# Patient Record
Sex: MALE | Race: WHITE | NOT HISPANIC OR LATINO | Employment: FULL TIME | URBAN - METROPOLITAN AREA
[De-identification: names, ages, dates, MRNs, and addresses within clinical notes are randomized per-mention and may not be internally consistent; named-entity substitution may affect disease eponyms.]

---

## 2017-04-04 ENCOUNTER — HOSPITAL ENCOUNTER (EMERGENCY)
Facility: CLINIC | Age: 27
Discharge: HOME | End: 2017-04-05
Attending: EMERGENCY MEDICINE | Admitting: EMERGENCY MEDICINE
Payer: COMMERCIAL

## 2017-04-04 VITALS
BODY MASS INDEX: 22.68 KG/M2 | HEIGHT: 71 IN | RESPIRATION RATE: 20 BRPM | TEMPERATURE: 98.3 F | WEIGHT: 162 LBS | DIASTOLIC BLOOD PRESSURE: 86 MMHG | HEART RATE: 86 BPM | SYSTOLIC BLOOD PRESSURE: 143 MMHG | OXYGEN SATURATION: 99 %

## 2017-04-04 DIAGNOSIS — R05.9 COUGH: ICD-10-CM

## 2017-04-04 DIAGNOSIS — J06.9 UPPER RESPIRATORY TRACT INFECTION, UNSPECIFIED TYPE: ICD-10-CM

## 2017-04-04 PROCEDURE — 99282 EMERGENCY DEPT VISIT SF MDM: CPT

## 2017-04-04 RX ORDER — BENZONATATE 100 MG/1
100 CAPSULE ORAL 3 TIMES DAILY PRN
Qty: 20 CAPSULE | Refills: 0 | Status: SHIPPED | OUTPATIENT
Start: 2017-04-04 | End: 2018-02-17

## 2017-04-04 RX ORDER — ALBUTEROL SULFATE 90 UG/1
2 AEROSOL, METERED RESPIRATORY (INHALATION) EVERY 4 HOURS PRN
Qty: 1 INHALER | Refills: 0 | Status: SHIPPED | OUTPATIENT
Start: 2017-04-04 | End: 2018-02-17

## 2017-04-04 NOTE — ED AVS SNAPSHOT
[facility]    [address] [place] [address]    Phone:  [phone]    Fax:  [phone]                                       Scooby Ellington   MRN: [id number]    Department:  [facility]   Date of Visit:  4/4/2017           Patient Information     Date Of Birth          1990        Your diagnoses for this visit were:     Cough     Upper respiratory tract infection, unspecified type        You were seen by Doroteo Sánchez MD.      Follow-up Information     Follow up with Bon Dunn MD. Schedule an appointment as soon as possible for a visit in 3 days.    Specialty:  Internal Medicine    Contact information:    [facility]  [address] [place] [address]  [phone]          Follow up with [facility].    Specialty:  EMERGENCY MEDICINE    Why:  For suture removal, If symptoms worsen    Contact information:    [address] [place] [address] [phone]        Discharge Instructions       Discharge Instructions  Bronchitis, Pneumonia, Bronchospasm    You were seen today for a chest infection or inflammation. If your doctor decided this was due to a bacterial infection, you may need an antibiotic. Sometimes these are caused by a virus, and then an antibiotic will not help.     Return to the Emergency Department if:    Your breathing gets much worse.    You are very weak, or feel much more ill.    You develop new symptoms, such as chest pain.    You cough up blood.    You are vomiting enough that you can t keep fluids or your medicine down.    What can I do to help myself?    Fill any prescriptions the doctor gave you and take them right away--especially antibiotics. Be sure to finish the whole antibiotic prescription.    You may be given a prescription for an inhaler, which can help loosen tight air passages.  Use this as needed,  "but not more often than directed. Inhalers work much better when used with a spacer.     You may be given a prescription for a steroid to reduce inflammation. Used long-term, these can have many serious side effects, but for short courses these do not happen. You may notice restlessness or increased appetite.        You may use non-prescription cough or cold medicines. Cough medicines may help, but don t make the cough go away completely.     Avoid smoke, because this can make your symptoms worse. If you smoke, this may be a good time to quit! Consider using nicotine lozenges, gum, or patches to reduce cravings.     If you have a fever, Tylenol  (acetaminophen), Motrin  (ibuprofen), or Advil  (ibuprofen) may help bring fever down and may help you feel more comfortable. Be sure to read and follow the package directions, and ask your doctor if you have questions.    Be sure to get your flu shot each year.  The pneumonia shot can help prevent pneumonia.  Probiotics: If you have been given an antibiotic, you may want to also take a probiotic pill or eat yogurt with live cultures. Probiotics have \"good bacteria\" to help your intestines stay healthy. Studies have shown that probiotics help prevent diarrhea and other intestine problems (including C. diff infection) when you take antibiotics. You can buy these without a prescription in the pharmacy section of the store.     If your doctor has told you to follow-up at your clinic, be sure to call right away and go to your appointment.  If there is any problem with keeping your appointment, call your doctor or return to the Emergency Department.    If you were given a prescription for medicine here today, be sure to read all of the information (including the package insert) that comes with your prescription.  This will include important information about the medicine, its side effects, and any warnings that you need to know about.  The pharmacist who fills the prescription " can provide more information and answer questions you may have about the medicine.  If you have questions or concerns that the pharmacist cannot address, please call or return to the Emergency Department.     Opioid Medication Information    Pain medications are among the most commonly prescribed medicines, so we are including this information for all our patients. If you did not receive pain medication or get a prescription for pain medicine, you can ignore it.     You may have been given a prescription for an opioid (narcotic) pain medicine and/or have received a pain medicine while here in the Emergency Department. These medicines can make you drowsy or impaired. You must not drive, operate dangerous equipment, or engage in any other dangerous activities while taking these medications. If you drive while taking these medications, you could be arrested for DUI, or driving under the influence. Do not drink any alcohol while you are taking these medications.     Opioid pain medications can cause addiction. If you have a history of chemical dependency of any type, you are at a higher risk of becoming addicted to pain medications.  Only take these prescribed medications to treat your pain when all other options have been tried. Take it for as short a time and as few doses as possible. Store your pain pills in a secure place, as they are frequently stolen and provide a dangerous opportunity for children or visitors in your house to start abusing these powerful medications. We will not replace any lost or stolen medicine.  As soon as your pain is better, you should flush all your remaining medication.     Many prescription pain medications contain Tylenol  (acetaminophen), including Vicodin , Tylenol #3 , Norco , Lortab , and Percocet .  You should not take any extra pills of Tylenol  if you are using these prescription medications or you can get very sick.  Do not ever take more than 3000 mg of acetaminophen in any 24  hour period.    All opioids tend to cause constipation. Drink plenty of water and eat foods that have a lot of fiber, such as fruits, vegetables, prune juice, apple juice and high fiber cereal.  Take a laxative if you don t move your bowels at least every other day. Miralax , Milk of Magnesia, Colace , or Senna  can be used to keep you regular.      Remember that you can always come back to the Emergency Department if you are not able to see your regular doctor in the amount of time listed above, if you get any new symptoms, or if there is anything that worries you.          24 Hour Appointment Hotline       To make an appointment at any [facility], call [phone] ([phone]). If you don't have a family doctor or clinic, we will help you find one. [facility] clinics are conveniently located to serve the needs of you and your family.             Review of your medicines      START taking        Dose / Directions Last dose taken    albuterol 108 (90 BASE) MCG/ACT Inhaler   Commonly known as:  albuterol   Dose:  2 puff   Quantity:  1 Inhaler        Inhale 2 puffs into the lungs every 4 hours as needed for shortness of breath / dyspnea   Refills:  0        benzonatate 100 MG capsule   Commonly known as:  TESSALON   Dose:  100 mg   Quantity:  20 capsule        Take 1 capsule (100 mg) by mouth 3 times daily as needed for cough   Refills:  0          Our records show that you are taking the medicines listed below. If these are incorrect, please call your family doctor or clinic.        Dose / Directions Last dose taken    oxyCODONE-acetaminophen 5-325 MG per tablet   Commonly known as:  PERCOCET   Dose:  1-2 tablet   Quantity:  30 tablet        Take 1-2 tablets by mouth every 4 hours as needed for pain (moderate to severe)   Refills:  0                Prescriptions were sent or printed at these locations (2 Prescriptions)                   Other Prescriptions                Printed at Department/Unit printer  (2 of 2)         albuterol (ALBUTEROL) 108 (90 BASE) MCG/ACT Inhaler               benzonatate (TESSALON) 100 MG capsule                Orders Needing Specimen Collection     None      Pending Results     No orders found for last 3 day(s).            Pending Culture Results     No orders found for last 3 day(s).            Test Results From Your Hospital Stay               Clinical Quality Measure: Blood Pressure Screening     Your blood pressure was checked while you were in the emergency department today. The last reading we obtained was  BP: 143/86 . Please read the guidelines below about what these numbers mean and what you should do about them.  If your systolic blood pressure (the top number) is less than 120 and your diastolic blood pressure (the bottom number) is less than 80, then your blood pressure is normal. There is nothing more that you need to do about it.  If your systolic blood pressure (the top number) is 120-139 or your diastolic blood pressure (the bottom number) is 80-89, your blood pressure may be higher than it should be. You should have your blood pressure rechecked within a year by a primary care provider.  If your systolic blood pressure (the top number) is 140 or greater or your diastolic blood pressure (the bottom number) is 90 or greater, you may have high blood pressure. High blood pressure is treatable, but if left untreated over time it can put you at risk for heart attack, stroke, or kidney failure. You should have your blood pressure rechecked by a primary care provider within the next 4 weeks.  If your provider in the emergency department today gave you specific instructions to follow-up with your doctor or provider even sooner than that, you should follow that instruction and not wait for up to 4 weeks for your follow-up visit.        Thank you for choosing [facility]       Thank you for choosing [facility] for your care. Our goal is always to provide you with excellent care. Hearing  "back from our patients is one way we can continue to improve our services. Please take a few minutes to complete the written survey that you may receive in the mail after you visit with us. Thank you!        [facility] Information     [employer] lets you send messages to your doctor, view your test results, renew your prescriptions, schedule appointments and more. To sign up, go to [email or web address] . Click on \"Log in\" on the left side of the screen, which will take you to the Welcome page. Then click on \"Sign up Now\" on the right side of the page.     You will be asked to enter the access code listed below, as well as some personal information. Please follow the directions to create your username and password.     Your access code is: [email or web address]  Expires: 7/3/2017 11:58 PM     Your access code will  in 90 days. If you need help or a new code, please call your [facility] clinic or [phone].        Care EveryWhere ID     This is your Care EveryWhere ID. This could be used by other organizations to access your [facility] medical records  [id number]        After Visit Summary       This is your record. Keep this with you and show to your community pharmacist(s) and doctor(s) at your next visit.                  "

## 2017-04-04 NOTE — ED AVS SNAPSHOT
[facility]    [address]    [facility] [address]    Phone:  [phone]    Fax:  [phone]                                       Scooby Ellington   MRN: [id number]    Department:  [facility]   Date of Visit:  4/4/2017           After Visit Summary Signature Page     I have received my discharge instructions, and my questions have been answered. I have discussed any challenges I see with this plan with the nurse or doctor.    ..........................................................................................................................................  Patient/Patient Representative Signature      ..........................................................................................................................................  Patient Representative Print Name and Relationship to Patient    ..................................................               ................................................  Date                                            Time    ..........................................................................................................................................  Reviewed by Signature/Title    ...................................................              ..............................................  Date                                                            Time

## 2017-04-05 NOTE — ED PROVIDER NOTES
CHIEF COMPLAINT:  Cough.      HISTORY OF PRESENT ILLNESS:  Scooby Ellington is a 26-year-old male presenting to the Emergency Department for evaluation of cough and left otalgia.  Symptoms have been present for approximately 3 days.  The cough has been intermittently productive of green and yellow sputum.  Left ear pain feels as though there is a pressure feeling.  He has noted associated rhinorrhea and mild nasal congestion.  He is a tobacco user, although is currently reducing his daily use.  He denies associated chest pain or shortness of breath.  Offers no other complaints or concerns at this time.  He has not received influenza vaccine this year.  No history otherwise of immunocompromising conditions.      PAST MEDICAL HISTORY:  Tobacco use.  History of eye trauma.  MRSA positive.      MEDICATIONS:  No regular medications.      PAST SURGICAL HISTORY:  Appendectomy.  Right ocular surgery.      SOCIAL HISTORY:  Every day smoker, marijuana use intermittently, .      REVIEW OF SYSTEMS:  A 10-point review of systems is negative aside from that mentioned in history of present illness.      PHYSICAL EXAMINATION:   VITAL SIGNS:  Nursing notes reviewed.  Blood pressure 143/86, pulse 86, temperature 98.3, SpO2 98%.   GENERAL:  Awake, alert, well-appearing, answers questions appropriately.   ENT:  Moist mucous membranes, posterior oropharynx is symmetric without erythema or exudate, no uvular deviation, no trismus, no oral lesions, tympanic membranes are translucent and gray bilaterally without appreciable erythema or overlying skin changes.   NECK:  Full range of motion, no lymphadenopathy.   RESPIRATORY:  Faint expiratory wheezes on the left side.  Remainder of the lung fields are clear.  No focal crackles, no increased respiratory effort, no stridor.   CARDIOVASCULAR:  Regular rate and rhythm, S1, S2 is present, no murmurs, gallops or rubs.   GASTROINTESTINAL:  Abdomen is soft and nontender, nondistended.  No  guarding or rebound is noted.   MUSCULOSKELETAL:  Full range of motion in upper and lower extremities, no obvious deformities, weightbearing.   NEUROLOGIC:  Good bulk and tone in upper and lower extremities, no focal deficits.   PSYCHIATRIC:  Mood and affect are appropriate.      MEDICAL DECISION MAKING:  Scooby Ellington is a 26-year-old male presenting to the Emergency Department for evaluation of cough and otalgia.  Symptoms have been present for approximately 3 days.  Vital signs on presentation reveal elevated blood pressure, otherwise are unremarkable.  History, exam and ED course are most consistent with upper respiratory infection.  He does have symptoms including nasal congestion, rhinorrhea, cough, and otalgia as well as cough which supports this diagnosis.  There is no current evidence of acute otitis media, mastoiditis, otitis externa, deep space neck infections, or pharyngitis.  Pulmonary exam did not reveal focal areas of consolidation.  In the absence of fever as well as given the duration of symptoms, I feel x-ray can be deferred safely.  He does have isolated area of faint expiratory wheezing which is likely related to bronchitis and in the setting of chronic tobacco use.  At this point, influenza testing indicated that he was outside the range for treatment.  Clinically, he shows no signs of systemic toxicity or increased respiratory effort.  The patient felt safe and stable for discharge home with close outpatient followup.  Recommended fluids to ensure hydration, some over-the-counter decongestants and Tessalon for cough suppression and albuterol inhaler p.r.n. for shortness of breath.  Return to the ER with worsening cough, shortness of breath, fevers or any other concerns.      IMPRESSION:     1.  Bronchitis.   2.  Respiratory infection.       XANDER CARDOSO MD           D: 04/05/2017 00:33   T: 04/05/2017 04:17   MT: [id number]      Name:     SCOOBY ELLINGTON   MRN:      [id number]         Account:      [id number]   :      1990           Visit Date:   2017      Document: [id number]

## 2017-04-05 NOTE — ED PROVIDER NOTES
Please See Dictated Note for Details regarding of encounter of same date    Dictation # - [id number]    12:33 AM  4/5/2017    Doroteo Sánchez MD  [employer]  [facility]       Doroteo Sánchez MD  04/05/17 0034

## 2018-02-17 ENCOUNTER — HOSPITAL ENCOUNTER (EMERGENCY)
Facility: CLINIC | Age: 28
Discharge: HOME | End: 2018-02-17
Attending: PHYSICIAN ASSISTANT | Admitting: PHYSICIAN ASSISTANT
Payer: COMMERCIAL

## 2018-02-17 ENCOUNTER — APPOINTMENT (OUTPATIENT)
Dept: GENERAL RADIOLOGY | Facility: CLINIC | Age: 28
End: 2018-02-17
Attending: PHYSICIAN ASSISTANT
Payer: COMMERCIAL

## 2018-02-17 VITALS
TEMPERATURE: 98.9 F | WEIGHT: 168 LBS | OXYGEN SATURATION: 97 % | DIASTOLIC BLOOD PRESSURE: 88 MMHG | SYSTOLIC BLOOD PRESSURE: 135 MMHG | RESPIRATION RATE: 18 BRPM | BODY MASS INDEX: 24.05 KG/M2 | HEIGHT: 70 IN

## 2018-02-17 DIAGNOSIS — S61.239A PUNCTURE WOUND OF FINGER OF LEFT HAND, INITIAL ENCOUNTER: ICD-10-CM

## 2018-02-17 PROCEDURE — 73140 X-RAY EXAM OF FINGER(S): CPT | Mod: LT

## 2018-02-17 PROCEDURE — 99283 EMERGENCY DEPT VISIT LOW MDM: CPT

## 2018-02-17 ASSESSMENT — ENCOUNTER SYMPTOMS: WOUND: 1

## 2018-02-17 NOTE — ED AVS SNAPSHOT
[facility]    [address]    [facility] [address]    Phone:  [phone]    Fax:  [phone]                                       Scooby Ellington   MRN: [id number]    Department:  [facility]   Date of Visit:  2/17/2018           After Visit Summary Signature Page     I have received my discharge instructions, and my questions have been answered. I have discussed any challenges I see with this plan with the nurse or doctor.    ..........................................................................................................................................  Patient/Patient Representative Signature      ..........................................................................................................................................  Patient Representative Print Name and Relationship to Patient    ..................................................               ................................................  Date                                            Time    ..........................................................................................................................................  Reviewed by Signature/Title    ...................................................              ..............................................  Date                                                            Time

## 2018-02-17 NOTE — ED AVS SNAPSHOT
[facility]    [address]    [facility] [address]    Phone:  [phone]    Fax:  [phone]                                       Scooby Ellington   MRN: [id number]    Department:  [facility]   Date of Visit:  2/17/2018           Patient Information     Date Of Birth          1990        Your diagnoses for this visit were:     Puncture wound of finger of left hand, initial encounter        You were seen by Mis Marcus PA-C.      Follow-up Information     Follow up with [facility] In 2 days.    Specialties:  Sports Medicine, Pain Management, Obstetrics & Gynecology, Pediatrics, Internal Medicine, Nephrology    Why:  As needed    Contact information:    [address]  [facility] [address] [phone]        Follow up with [facility].    Specialty:  EMERGENCY MEDICINE    Why:  If symptoms worsen    Contact information:    [address]  [facility] [address] [phone]        Discharge Instructions         Puncture Wound       A puncture wound occurs when a pointed object pushes into the skin. It may go into the tissues below the skin, including fat and muscle. This type of wound is narrow and deep and can be hard to clean. Because of this, puncture wounds are at high risk for becoming infected.  X-rays may be done to check the wound for objects stuck under the skin. Your may also need a tetanus shot. This is given if you are not up to date on this vaccination and the object that caused the wound may lead to tetanus.  Home care    Your healthcare provider may prescribe an antibiotic. This is to help prevent infection. Follow all instructions for taking this medicine. Take the medicine every day until it is gone or you are told to stop. You should not have any left over.    The healthcare provider may  prescribe medicines for pain. Follow instructions for taking them.    You can take acetaminophen or ibuprofen for pain, unless you were given a different pain medicine to use.     Follow the healthcare provider s instructions on how to care for the wound.    Keep the wound clean and dry. Do not get the wound wet until you are told it is okay to do so. If the area gets wet, gently pat it dry with a clean cloth. Replace the wet bandage with a dry one.    If a bandage was applied and it becomes wet or dirty, replace it. Otherwise, leave it in place for the first 24 hours.    Once you can get the wound wet, you may shower as usual but do not soak the wound in water (no tub baths or swimming)    Even with proper treatment, a puncture wound may become infected. Check the wound daily for signs of infection listed below.  Follow-up care  Follow up with your healthcare provider as advised.   When to seek medical advice  Call your healthcare provider right away if any of these occur:    Signs of infection, including:    Increasing redness or swelling around the wound    Increased warmth of the wound    Worsening pain    Red streaking lines away from the wound    Draining pus    Fever of 100.4 F (38. C) or higher or as directed by your healthcare provider    Wound changes colors    Numbness around the wound    Decreased movement around the injured area  Date Last Reviewed: 8/24/2015 2000-2017 [employer]. [address] [place] [address]. All rights reserved. This information is not intended as a substitute for professional medical care. Always follow your healthcare professional's instructions.          24 Hour Appointment Hotline       To make an appointment at any [facility], call [phone] ([phone]). If you don't have a family doctor or clinic, we will help you find one. [facility] clinics are conveniently located to serve the needs of you and your family.             Review of your  medicines      Notice     You have not been prescribed any medications.            Procedures and tests performed during your visit     Fingers XR, 2-3 views, left      Orders Needing Specimen Collection     None      Pending Results     No orders found from 2/15/2018 to 2/18/2018.            Pending Culture Results     No orders found from 2/15/2018 to 2/18/2018.            Pending Results Instructions     If you had any lab results that were not finalized at the time of your Discharge, you can call the ED Lab Result RN at [phone]. You will be contacted by this team for any positive Lab results or changes in treatment. The nurses are available 7 days a week from 10A to 6:30P.  You can leave a message 24 hours per day and they will return your call.        Test Results From Your Hospital Stay        2/17/2018  7:03 PM      Narrative     LEFT FINGER TWO OR MORE VIEWS    2/17/2018 6:40 PM     HISTORY: Puncture wound, possible bone fracture.     COMPARISON: None.    FINDINGS: The left fifth finger is normal. No fracture. No opaque  foreign body density in the soft tissues.        Impression     IMPRESSION: Negative.     ARTEMIO GRAY MD                Clinical Quality Measure: Blood Pressure Screening     Your blood pressure was checked while you were in the emergency department today. The last reading we obtained was  BP: 135/88 . Please read the guidelines below about what these numbers mean and what you should do about them.  If your systolic blood pressure (the top number) is less than 120 and your diastolic blood pressure (the bottom number) is less than 80, then your blood pressure is normal. There is nothing more that you need to do about it.  If your systolic blood pressure (the top number) is 120-139 or your diastolic blood pressure (the bottom number) is 80-89, your blood pressure may be higher than it should be. You should have your blood pressure rechecked within a year by a primary care  "provider.  If your systolic blood pressure (the top number) is 140 or greater or your diastolic blood pressure (the bottom number) is 90 or greater, you may have high blood pressure. High blood pressure is treatable, but if left untreated over time it can put you at risk for heart attack, stroke, or kidney failure. You should have your blood pressure rechecked by a primary care provider within the next 4 weeks.  If your provider in the emergency department today gave you specific instructions to follow-up with your doctor or provider even sooner than that, you should follow that instruction and not wait for up to 4 weeks for your follow-up visit.        Thank you for choosing [facility]       Thank you for choosing [facility] for your care. Our goal is always to provide you with excellent care. Hearing back from our patients is one way we can continue to improve our services. Please take a few minutes to complete the written survey that you may receive in the mail after you visit with us. Thank you!        [employer] Information     [employer] lets you send messages to your doctor, view your test results, renew your prescriptions, schedule appointments and more. To sign up, go to [email or web address] . Click on \"Log in\" on the left side of the screen, which will take you to the Welcome page. Then click on \"Sign up Now\" on the right side of the page.     You will be asked to enter the access code listed below, as well as some personal information. Please follow the directions to create your username and password.     Your access code is: [email or web address]  Expires: 2018  7:11 PM     Your access code will  in 90 days. If you need help or a new code, please call your [facility] clinic or [phone].        Care EveryWhere ID     This is your Care EveryWhere ID. This could be used by other organizations to access your [facility] medical records  [id number]        Equal Access to Services     ELIZABETH FOLEY AH: Paige ruiz" brian Garza, edin mckeon, lyric hodges, beatriz loomis. So [facility] [phone].    ATENCIÓN: Si habla español, tiene a jensen disposición servicios gratuitos de asistencia lingüística. Llame al [phone].    We comply with applicable federal civil rights laws and [place] laws. We do not discriminate on the basis of race, color, national origin, age, disability, sex, sexual orientation, or gender identity.            After Visit Summary       This is your record. Keep this with you and show to your community pharmacist(s) and doctor(s) at your next visit.

## 2018-02-18 NOTE — ED NOTES
"A&Ox4. ABC's intact. Pt c/o left 5th finger puncture wound from metal pin at work. Pt was trying to pull it out of a machine and it went into his pinky.  States it got stuck and he had to pull it out \"probably to the bone\".   Pain 5/10 at this time.   "

## 2018-02-18 NOTE — ED PROVIDER NOTES
"  History     Chief Complaint:  Puncture Wound    HPI   Scooby Ellington is a 27 year old male who presents with puncture wound. The patient reports that 1430 hours today that he was applying manual force to a vehicle at work when his hands broke away under continued force and landed with his left 5th finger onto a 3/16\" brake line, producing a wound where the brake line stopped \"at my bone\" and didn't puncture all the way through. He pulled it out right away. He also reports that the portion of the brake line that broke his skin was clean and that the resulting wound bled profusely since and wouldn't close. The patient mentioned that his 5/10 pain scale pain is localized to the left 5th finger with radiation to his knuckle. The patient noted that he is up to date on his tetanus shot and denies any allergies and history of diabetes, cancer, and being immunocompromised.     Allergies:  No known drug allergies    Medications:    The patient is currently on no regular medications.    Past Medical History:    H/O Eye Trauma  MRSA Culture Positive    Past Surgical History:    HC REMV Cataract Extracap, Insert Lens  Laparoscopic Appendectomy    Family History:    Heart Disease  Kidney Cancer  Cancer, Lymph Node    Social History:  Marital Status:     Tobacco Status: Current Smoker, 1/2 pack per day; Former Smokeless Tobacco User  Alcohol Use: Yes, Occasionally     Review of Systems   Skin: Positive for wound.   All other systems reviewed and are negative.    Physical Exam     Patient Vitals for the past 24 hrs:   BP Temp Temp src Heart Rate Resp SpO2 Height Weight   02/17/18 1806 135/88 98.9  F (37.2  C) Temporal 92 18 97 % 1.778 m (5' 10\") 76.2 kg (168 lb)     Physical Exam  Constitutional:  Alert, attentive  Cardiovascular:  2+ radial and ulnar pulses to the bilateral upper extremities   Neurological:  5/5 strength to the radian, ulnar and median motor distributions;      sensation intact to light touch to the " radian, ulnar and median distributions  MSK:   There is a small puncture wound to the left proximal 5th finger to the lateral aspect. Normal ROM without pain to the digits of the left hand. No other    injuries.   Skin:    Skin is warm and dry.      Emergency Department Course     Imaging:  Radiographic findings were communicated with the patient who voiced understanding of the findings.  X-ray Left Fingers, 2-3 views:  IMPRESSION: Negative.   Result per radiology.     Emergency Department Course:  Past medical records, nursing notes, and vitals reviewed.  1809: I performed an exam of the patient and obtained history, as documented above.  The patient was sent for a Left Finger X-Ray while in the emergency department, findings above.  The ED tech irrigated and dressed the puncture wound  1910: I rechecked the patient. Findings and plan explained to the Patient. Patient discharged home with instructions regarding supportive care, medications, and reasons to return. The importance of close follow-up was reviewed.     Impression & Plan      Medical Decision Making:  Scooby Ellington is a 27 year old male who presents for evaluation of a puncture wound to the left fifth finger.  This was not already closed upon presentation to the ED.     Xray shows no fracture, air in soft tissues or foreign body. Antibiotics are not indicated but he will watch for signs of infection and follow up with PCP in the next few days. Exam is benign at this point. No signs of lymphadenitis, lymphangitis, necrotizing fascitis, osteomyelitis, abscess, cellulitis, etc.    Diagnosis:    ICD-10-CM    1. Puncture wound of finger of left hand, initial encounter S61.239A        Disposition:  discharged to home    Gomez Galindo  2/17/2018   [facility] EMERGENCY DEPARTMENT  RANJAN, Gomez Galindo, am serving as a scribe at 6:09 PM on 2/17/2018 to document services personally performed by Mis Marcus* based on my observations and the  provider's statements to me.         Mis Marcus PA-C  02/17/18 2052

## 2018-02-18 NOTE — DISCHARGE INSTRUCTIONS
Puncture Wound       A puncture wound occurs when a pointed object pushes into the skin. It may go into the tissues below the skin, including fat and muscle. This type of wound is narrow and deep and can be hard to clean. Because of this, puncture wounds are at high risk for becoming infected.  X-rays may be done to check the wound for objects stuck under the skin. Your may also need a tetanus shot. This is given if you are not up to date on this vaccination and the object that caused the wound may lead to tetanus.  Home care    Your healthcare provider may prescribe an antibiotic. This is to help prevent infection. Follow all instructions for taking this medicine. Take the medicine every day until it is gone or you are told to stop. You should not have any left over.    The healthcare provider may prescribe medicines for pain. Follow instructions for taking them.    You can take acetaminophen or ibuprofen for pain, unless you were given a different pain medicine to use.     Follow the healthcare provider s instructions on how to care for the wound.    Keep the wound clean and dry. Do not get the wound wet until you are told it is okay to do so. If the area gets wet, gently pat it dry with a clean cloth. Replace the wet bandage with a dry one.    If a bandage was applied and it becomes wet or dirty, replace it. Otherwise, leave it in place for the first 24 hours.    Once you can get the wound wet, you may shower as usual but do not soak the wound in water (no tub baths or swimming)    Even with proper treatment, a puncture wound may become infected. Check the wound daily for signs of infection listed below.  Follow-up care  Follow up with your healthcare provider as advised.   When to seek medical advice  Call your healthcare provider right away if any of these occur:    Signs of infection, including:    Increasing redness or swelling around the wound    Increased warmth of the wound    Worsening pain    Red  streaking lines away from the wound    Draining pus    Fever of 100.4 F (38. C) or higher or as directed by your healthcare provider    Wound changes colors    Numbness around the wound    Decreased movement around the injured area  Date Last Reviewed: 8/24/2015 2000-2017 [employer]. [address] [place] [address]. All rights reserved. This information is not intended as a substitute for professional medical care. Always follow your healthcare professional's instructions.

## 2018-05-31 ENCOUNTER — HOSPITAL ENCOUNTER (EMERGENCY)
Facility: CLINIC | Age: 28
Discharge: HOME | End: 2018-06-01
Attending: EMERGENCY MEDICINE | Admitting: EMERGENCY MEDICINE
Payer: COMMERCIAL

## 2018-05-31 ENCOUNTER — APPOINTMENT (OUTPATIENT)
Dept: GENERAL RADIOLOGY | Facility: CLINIC | Age: 28
End: 2018-05-31
Attending: PHYSICIAN ASSISTANT
Payer: COMMERCIAL

## 2018-05-31 VITALS
RESPIRATION RATE: 16 BRPM | BODY MASS INDEX: 23.1 KG/M2 | SYSTOLIC BLOOD PRESSURE: 147 MMHG | WEIGHT: 165 LBS | HEART RATE: 79 BPM | OXYGEN SATURATION: 100 % | DIASTOLIC BLOOD PRESSURE: 102 MMHG | HEIGHT: 71 IN | TEMPERATURE: 99.5 F
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
## 2018-05-31 DIAGNOSIS — S13.9XXA NECK SPRAIN, INITIAL ENCOUNTER: ICD-10-CM

## 2018-05-31 DIAGNOSIS — S46.912A STRAIN OF LEFT SHOULDER, INITIAL ENCOUNTER: ICD-10-CM

## 2018-05-31 PROCEDURE — 25000132 ZZH RX MED GY IP 250 OP 250 PS 637: Performed by: PHYSICIAN ASSISTANT

## 2018-05-31 PROCEDURE — 73000 X-RAY EXAM OF COLLAR BONE: CPT | Mod: LT

## 2018-05-31 PROCEDURE — 99284 EMERGENCY DEPT VISIT MOD MDM: CPT

## 2018-05-31 PROCEDURE — 73030 X-RAY EXAM OF SHOULDER: CPT | Mod: LT

## 2018-05-31 RX ORDER — CYCLOBENZAPRINE HCL 10 MG
10 TABLET ORAL ONCE
Status: COMPLETED | OUTPATIENT
Start: 2018-05-31 | End: 2018-05-31

## 2018-05-31 RX ADMIN — CYCLOBENZAPRINE HYDROCHLORIDE 10 MG: 10 TABLET, FILM COATED ORAL at 23:51

## 2018-05-31 NOTE — ED AVS SNAPSHOT
[facility] Emergency Department    [address]    [facility] [address]    Phone:  [phone]    Fax:  [phone]                                       Scooby Ellington   MRN: [id number]    Department:  [facility] Emergency Department   Date of Visit:  5/31/2018           After Visit Summary Signature Page     I have received my discharge instructions, and my questions have been answered. I have discussed any challenges I see with this plan with the nurse or doctor.    ..........................................................................................................................................  Patient/Patient Representative Signature      ..........................................................................................................................................  Patient Representative Print Name and Relationship to Patient    ..................................................               ................................................  Date                                            Time    ..........................................................................................................................................  Reviewed by Signature/Title    ...................................................              ..............................................  Date                                                            Time
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
## 2018-05-31 NOTE — LETTER
June 1, 2018      To Whom It May Concern:      Scooby Ellington was seen in our Emergency Department today, 06/01/18.  I expect his condition to improve over the next 3 days.  He may return to work/school when improved.    Sincerely,        Jailyn Mejia RN

## 2018-06-01 RX ORDER — METHOCARBAMOL 750 MG/1
750-1500 TABLET, FILM COATED ORAL 3 TIMES DAILY PRN
Qty: 15 TABLET | Refills: 0 | Status: SHIPPED | OUTPATIENT
Start: 2018-06-01 | End: 2018-06-06

## 2018-06-01 ASSESSMENT — ENCOUNTER SYMPTOMS
BACK PAIN: 1
FATIGUE: 1
MYALGIAS: 1
ARTHRALGIAS: 1
NECK PAIN: 1
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
## 2018-10-09 ENCOUNTER — HOSPITAL ENCOUNTER (EMERGENCY)
Facility: CLINIC | Age: 28
Discharge: HOME | End: 2018-10-09
Attending: PSYCHIATRY & NEUROLOGY | Admitting: PSYCHIATRY & NEUROLOGY
Payer: COMMERCIAL

## 2018-10-09 VITALS
RESPIRATION RATE: 14 BRPM | BODY MASS INDEX: 24.41 KG/M2 | WEIGHT: 175 LBS | DIASTOLIC BLOOD PRESSURE: 72 MMHG | TEMPERATURE: 98.8 F | HEART RATE: 76 BPM | OXYGEN SATURATION: 99 % | SYSTOLIC BLOOD PRESSURE: 127 MMHG

## 2018-10-09 DIAGNOSIS — F39 MOOD DISORDER (H): ICD-10-CM

## 2018-10-09 PROCEDURE — 90791 PSYCH DIAGNOSTIC EVALUATION: CPT

## 2018-10-09 PROCEDURE — 99285 EMERGENCY DEPT VISIT HI MDM: CPT | Mod: 25 | Performed by: PSYCHIATRY & NEUROLOGY

## 2018-10-09 PROCEDURE — 99284 EMERGENCY DEPT VISIT MOD MDM: CPT | Mod: Z6 | Performed by: PSYCHIATRY & NEUROLOGY

## 2018-10-09 RX ORDER — SERTRALINE HYDROCHLORIDE 100 MG/1
100 TABLET, FILM COATED ORAL DAILY
Qty: 30 TABLET | Refills: 1 | Status: SHIPPED | OUTPATIENT
Start: 2018-10-09 | End: 2021-04-26 | Stop reason: ALTCHOICE

## 2018-10-09 RX ORDER — SERTRALINE HYDROCHLORIDE 25 MG/1
75 TABLET, FILM COATED ORAL DAILY
Qty: 42 TABLET | Refills: 0 | Status: SHIPPED | OUTPATIENT
Start: 2018-10-09 | End: 2021-04-26 | Stop reason: ALTCHOICE

## 2018-10-09 ASSESSMENT — ENCOUNTER SYMPTOMS
NERVOUS/ANXIOUS: 0
FEVER: 0
DYSPHORIC MOOD: 0
SHORTNESS OF BREATH: 0
HALLUCINATIONS: 0
ABDOMINAL PAIN: 0

## 2018-10-09 NOTE — ED AVS SNAPSHOT
[facility], Emergency Department    [address]    [place] [address]    Phone:  [phone]    Fax:  [phone]                                       Scooby Ellington   MRN: [id number]    Department:  [facility], Emergency Department   Date of Visit:  10/9/2018           Patient Information     Date Of Birth          1990        Your diagnoses for this visit were:     Mood disorder (H)        You were seen by Rafael Candelaria MD.        Discharge Instructions       Follow up with your primary MD to manage your zoloft    Increase zoloft to 75 mg once a day for 2 weeks and then go up to 100 mg    Go to therapy when scheduled on Thursday 10/11/18 at 11 am    24 Hour Appointment Hotline       To make an appointment at any [facility] clinic, call [phone] ([phone]). If you don't have a family doctor or clinic, we will help you find one. [facility] clinics are conveniently located to serve the needs of you and your family.             Review of your medicines      CONTINUE these medicines which may have CHANGED, or have new prescriptions. If we are uncertain of the size of tablets/capsules you have at home, strength may be listed as something that might have changed.        Dose / Directions Last dose taken    * sertraline 25 MG tablet   Commonly known as:  ZOLOFT   Dose:  75 mg   What changed:    - medication strength  - how much to take   Quantity:  42 tablet        Take 3 tablets (75 mg) by mouth daily   Refills:  0        * sertraline 100 MG tablet   Commonly known as:  ZOLOFT   Dose:  100 mg   What changed:  You were already taking a medication with the same name, and this prescription was added. Make sure you understand how and when to take each.   Quantity:  30 tablet        Take 1 tablet (100 mg) by mouth daily   Refills:  1        * Notice:  This list has 2 medication(s) that are the same as other medications prescribed for you. Read the directions carefully, and ask your  "doctor or other care provider to review them with you.            Prescriptions were sent or printed at these locations (2 Prescriptions)                   Other Prescriptions                Printed at Department/Unit printer (2 of 2)         sertraline (ZOLOFT) 100 MG tablet               sertraline (ZOLOFT) 25 MG tablet                Orders Needing Specimen Collection     None      Pending Results     No orders found from 10/7/2018 to 10/10/2018.            Pending Culture Results     No orders found from 10/7/2018 to 10/10/2018.            Pending Results Instructions     If you had any lab results that were not finalized at the time of your Discharge, you can call the ED Lab Result RN at [phone]. You will be contacted by this team for any positive Lab results or changes in treatment. The nurses are available 7 days a week from 10A to 6:30P.  You can leave a message 24 hours per day and they will return your call.        Thank you for choosing [facility]       Thank you for choosing [facility] for your care. Our goal is always to provide you with excellent care. Hearing back from our patients is one way we can continue to improve our services. Please take a few minutes to complete the written survey that you may receive in the mail after you visit with us. Thank you!        [employer] Information     [employer] lets you send messages to your doctor, view your test results, renew your prescriptions, schedule appointments and more. To sign up, go to [email or web address] . Click on \"Log in\" on the left side of the screen, which will take you to the Welcome page. Then click on \"Sign up Now\" on the right side of the page.     You will be asked to enter the access code listed below, as well as some personal information. Please follow the directions to create your username and password.     Your access code is: [email or web address]  Expires: 2019  7:59 PM     Your access code will  in 90 days. If you need help or a new " code, please call your [facility] clinic or [phone].        Care EveryWhere ID     This is your Care EveryWhere ID. This could be used by other organizations to access your [facility] medical records  [id number]        Equal Access to Services     ELIZABETH FOLEY : Paige Garza, waaxda luqadaha, qaybta kaalmada tracie, beatriz loomis. So [facility] [phone].    ATENCIÓN: Si habla español, tiene a jensen disposición servicios gratuitos de asistencia lingüística. Llame al [phone].    We comply with applicable federal civil rights laws and [place] laws. We do not discriminate on the basis of race, color, national origin, age, disability, sex, sexual orientation, or gender identity.            After Visit Summary       This is your record. Keep this with you and show to your community pharmacist(s) and doctor(s) at your next visit.

## 2018-10-09 NOTE — ED AVS SNAPSHOT
[facility], [place], Emergency Department    [address]    [facility] [address]    Phone:  [phone]    Fax:  [phone]                                       Scooby Ellington   MRN: [id number]    Department:  [facility], Emergency Department   Date of Visit:  10/9/2018           After Visit Summary Signature Page     I have received my discharge instructions, and my questions have been answered. I have discussed any challenges I see with this plan with the nurse or doctor.    ..........................................................................................................................................  Patient/Patient Representative Signature      ..........................................................................................................................................  Patient Representative Print Name and Relationship to Patient    ..................................................               ................................................  Date                                   Time    ..........................................................................................................................................  Reviewed by Signature/Title    ...................................................              ..............................................  Date                                               Time          22EPIC Rev 08/18

## 2018-10-10 NOTE — DISCHARGE INSTRUCTIONS
Follow up with your primary MD to manage your zoloft    Increase zoloft to 75 mg once a day for 2 weeks and then go up to 100 mg    Go to therapy when scheduled on Thursday 10/11/18 at 11 am

## 2018-10-10 NOTE — ED PROVIDER NOTES
"  History     Chief Complaint   Patient presents with     Manic Behavior     Is feeling manic, denies any current SI. Has been having sleep and anger issues for about 4 months     The history is provided by the patient and medical records.     Scooby Ellington is a 28 year old male who comes in due to him feeling like he needs further help with his depression. He states that he feels sad a lot.  He recently had some suicidal thoughts due to a relative committing suicide.  He held his gun to his mouth but thought better of it. He dismantled it and gave it away along with his other gun. He denies any suicidal thoughts now. He admits to smoking marijuana prior to coming in and thinks it might be making him feel better than he has been. He is worried that the medication his primary MD started for him is not right for him. He states that he started on 25 mg of zoloft which was helpful at first but then \"faded away.\"  He then increased it on his own to 50 mg with a similar result. He did this on his own.  His primary MD wanted him to stay on the 25 mg for a longer period of time prior to going up. He states that he has some irritability at times.  He states that he sleeps on 3 hours a day but this is baseline for him. He works 12 hour days and does fine. He has used cocaine in the past but not frequently. He does use marijuana close to daily. He is interested in therapy.  He denies any spending money that he does not have, having periods of high productivity or being hypersexual. He denies any hallucinations.      Please see the 's assessment in [facility] from today (10/9/18) for further details.    I have reviewed the Medications, Allergies, Past Medical and Surgical History, and Social History in the Epic system.    Review of Systems   Constitutional: Negative for fever.   Respiratory: Negative for shortness of breath.    Cardiovascular: Negative for chest pain.   Gastrointestinal: Negative for abdominal pain. "   Psychiatric/Behavioral: Negative for dysphoric mood, hallucinations, self-injury and suicidal ideas. The patient is not nervous/anxious.    All other systems reviewed and are negative.      Physical Exam   BP: 127/72  Pulse: 76  Temp: 98.8  F (37.1  C)  Resp: 14  Weight: 79.4 kg (175 lb)  SpO2: 99 %      Physical Exam   Constitutional: He is oriented to person, place, and time. He appears well-developed and well-nourished.   Cardiovascular: Normal rate, regular rhythm and normal heart sounds.    Pulmonary/Chest: Effort normal and breath sounds normal. No respiratory distress.   Neurological: He is alert and oriented to person, place, and time.   Psychiatric: He has a normal mood and affect. His speech is normal and behavior is normal. Judgment and thought content normal. He is not actively hallucinating. Thought content is not paranoid and not delusional. Cognition and memory are normal. He expresses no homicidal and no suicidal ideation. He expresses no suicidal plans and no homicidal plans.   Scooby is a 27 y/o male who looks his age. He is well groomed with good eye contact.   Nursing note and vitals reviewed.      ED Course     ED Course     Procedures               Labs Ordered and Resulted from Time of ED Arrival Up to the Time of Departure from the ED - No data to display         Assessments & Plan (with Medical Decision Making)   Scooby will be discharged home.  He is not an imminent risk to himself or others. He will restart zoloft at 75 mg (it is safe to do so after only missing 2 doses) for 2 weeks. He will then increase to 100 mg.  It seems that the medication is helpful at first but then seems to wear off.  This is often a sign that the dose is not high enough. He talks about possible kiara but when you ask specific questions, it seems low likelihood as there is no true manic symptoms.  He always goes on 3 hours of sleep, has not done anything out of character and talks more about depression.  There is a higher chance of bipolar 2 due to some irritability but he has not had any worsening of symptoms with the zoloft so far, only improvements and then the effects wearing off.  He will follow up with his primary MD for medication management. He was set up with therapy on 10/11/18.  He believes he can stop marijuana on his own.  It was explained to him that it will not allow the medication to work properly and can make his depressive symptoms worse.      I have reviewed the nursing notes.    I have reviewed the findings, diagnosis, plan and need for follow up with the patient.    New Prescriptions    SERTRALINE (ZOLOFT) 100 MG TABLET    Take 1 tablet (100 mg) by mouth daily       Final diagnoses:   Mood disorder (H)       10/9/2018   [facility], [place], EMERGENCY DEPARTMENT     Rafael Candelaria MD  10/09/18 2021

## 2019-02-15 ENCOUNTER — HOSPITAL ENCOUNTER (EMERGENCY)
Facility: CLINIC | Age: 29
Discharge: HOME | End: 2019-02-15
Attending: EMERGENCY MEDICINE | Admitting: EMERGENCY MEDICINE
Payer: COMMERCIAL

## 2019-02-15 VITALS
DIASTOLIC BLOOD PRESSURE: 85 MMHG | TEMPERATURE: 99 F | RESPIRATION RATE: 18 BRPM | WEIGHT: 178.57 LBS | BODY MASS INDEX: 24.91 KG/M2 | SYSTOLIC BLOOD PRESSURE: 137 MMHG | OXYGEN SATURATION: 98 % | HEART RATE: 83 BPM

## 2019-02-15 DIAGNOSIS — Z51.89 WOUND CHECK, ABSCESS: ICD-10-CM

## 2019-02-15 PROCEDURE — 99282 EMERGENCY DEPT VISIT SF MDM: CPT

## 2019-02-15 RX ORDER — SULFAMETHOXAZOLE/TRIMETHOPRIM 800-160 MG
1 TABLET ORAL 2 TIMES DAILY
COMMUNITY
End: 2021-04-26

## 2019-02-15 NOTE — ED AVS SNAPSHOT
[facility] Emergency Department  [address]  [facility] [address]  Phone:  [phone]  Fax:  [phone]                                    Scooby Ellington   MRN: [id number]    Department:  [facility] Emergency Department   Date of Visit:  2/15/2019           After Visit Summary Signature Page    I have received my discharge instructions, and my questions have been answered. I have discussed any challenges I see with this plan with the nurse or doctor.    ..........................................................................................................................................  Patient/Patient Representative Signature      ..........................................................................................................................................  Patient Representative Print Name and Relationship to Patient    ..................................................               ................................................  Date                                   Time    ..........................................................................................................................................  Reviewed by Signature/Title    ...................................................              ..............................................  Date                                               Time          22EPIC Rev 08/18

## 2019-02-16 NOTE — ED TRIAGE NOTES
Pt currently being treated for MRSA infx on elbow, now presents with wound check.  Has been on bactrim for last 3 days.  Redness and pain have improved since abx, pt just wants to be sure it doesn't need to be drained again.

## 2019-02-16 NOTE — ED PROVIDER NOTES
History     Chief Complaint:  Wound Check    HPI   Scooby Ellington is a 28 year old male with a history of MRSA who presents to the emergency department for evaluation of wound check. Of note, the patient is currently being treated with Bactrim (day 4) for MRSA infection to the right elbow, and the area of redness has been shrinking. The patient reports he has had new swelling distal to that area without redness, but he has concern for abscess and wants evaluation for the wound to ensure there is no need for surgical drainage. He does note pain with movement to the forearm yesterday, but none today    Allergies:  NKDA     Medications:    Bactrim    Past Medical History:    MRSA    Past Surgical History:    Removal cataract  Appendectomy    Family History:    Cancer, kidney    Social History:  Presents with friend.  Current every day smoker, 1 ppd.  Positive for alcohol use.   Marital Status:   [2]     Review of Systems   All other systems reviewed and are negative.    Physical Exam     Patient Vitals for the past 24 hrs:   BP Temp Temp src Pulse Resp SpO2 Weight   02/15/19 2202 137/85 99  F (37.2  C) Oral 83 18 98 % 81 kg (178 lb 9.2 oz)     Physical Exam  General: Resting on the bed.  Head: No obvious trauma to head.  Ears, Nose, Throat:  External ears normal.  Nose normal.    Eyes:  Conjunctivae clear.  Left dilated pupil at baseline per patient.  Neck: Normal range of motion.  Neck supple.   CV: Regular rate and rhythm.  No murmurs.      Respiratory: Effort normal and breath sounds normal.  No wheezing or crackles.   Gastrointestinal: Soft.  No distension. There is no tenderness.    Musculoskeletal: Right upper extremity normal range of motion.  Non tender extremities to palpations.    Skin: Skin is warm and dry.  Small area of redness and slight raised with warmth on the posterior aspect of the right forearm.  Full range of motion of the elbow.  No crepitus.  Old outlined area appreciable that is far  bigger size than the current smaller area.    Emergency Department Course     Emergency Department Course:  Nursing notes and vitals reviewed. 2308 I performed an exam of the patient as documented above. I discussed the results of his workup thus far.     Findings and plan explained to the Patient. Patient discharged home with instructions regarding supportive care, medications, and reasons to return. The importance of close follow-up was reviewed.     Impression & Plan      Medical Decision Making:  Scooby Ellington is a 28-year-old male with history of prior MRSA infection presents with wound check.  Vital signs are unremarkable.  Broad differential was pursued, including but not limited to necrotizing fasciitis, abscess, cellulitis, failed outpatient management, sepsis, etc.  Overall, patient is well-appearing nontoxic.  The wound is isolated from the joint, and I do not suspect septic joint.  The old markings are appreciable, and the wound has dramatically decreased in size.  There is no appreciable fluctuance or induration to suggest abscess.  No indication for incision and drainage at this time.  There is no crepitus, vital signs are stable, no signs of systemic infection to suggest sepsis or necrotizing fasciitis.  Overall, the patient's wound appears to be responding quite well to the Bactrim.  The wound is markedly better.  The new area of redness is outlined for the patient. I do not feel that he has failed outpatient management.  He appears to be responding quite well to the Bactrim. Therefore, I advised to continue this treatment.  Follow-up in 2 days was recommended.  Strict return precautions were discussed.    Diagnosis:    ICD-10-CM   1. Wound check, abscess Z51.89       Disposition:  discharged to home    RANJAN, Demarco Orellana, am serving as a scribe on 2/15/2019 at 10:58 PM to personally document services performed by Louisa Martinez MD based on my observations and the provider's statements to me.      Demarco Orellana  2/15/2019   [facility] EMERGENCY DEPARTMENT       Louisa Martinez MD  02/16/19 0300

## 2019-02-16 NOTE — DISCHARGE INSTRUCTIONS
Please return to the ED if you notice increasing redness, swelling, fevers >101, nausea or vomiting, chest pain or shortness of breath or other acute concerns.  Please see your PCP for a check up in 2-3 days to make sure it is improving.      Discharge Instructions  Cellulitis    Cellulitis is an infection of the skin that occurs when bacteria enter the skin.   Symptoms are generally redness, swelling, warmth and pain.  Your infection appeared to be appropriate to treat at home with antibiotics.  However, sometimes your infection may be worse than it seemed at first, or may worsen with time. If you have new or worse symptoms, you may need to be seen again in the Emergency Department or by your primary provider.    Generally, every Emergency Department visit should have a follow-up clinic visit with either a primary or a specialty clinic/provider. Please follow-up as instructed by your emergency provider today.    Return to the Emergency Department if:  The redness, pain, or swelling gets a lot worse.  If the red area was marked, return if it is red significantly beyond the marked area.  You are unable to get your antibiotics, or are vomiting (throwing up) these pills, or you cannot take them.  You are feeling more ill, weak or lightheaded.  You start to run a new fever (temperature >101 F).  Anything else about the infection worries or concerns you.  Treatment:  Start your antibiotics right away, and take them as prescribed. Be sure to finish the whole prescription, even if you are better.  Apply a heating pad, warm packs, or warm water soaks to the infected area for 15 minutes at a time, at least 3 times a day. Do not use a heating pad on your feet or legs if you have diabetes. Do not sleep with a heating pad on, since this can cause burns or skin injury.  Rest your injured area for at least 1-2 days. After that you may start using your extremity again as long as there is not too much pain.   Raise the injured area  above the level of your heart as much as possible in the first 1-2 days.  Tylenol  (acetaminophen), Motrin  (ibuprofen), or Advil  (ibuprofen) may help may help reduce pain and fever and may help you feel more comfortable. Be sure to read and follow the package directions, and ask your provider if you have questions.    If you were given a prescription for medicine here today, be sure to read all of the information (including the package insert) that comes with your prescription.  This will include important information about the medicine, its side effects, and any warnings that you need to know about.  The pharmacist who fills the prescription can provide more information and answer questions you may have about the medicine.  If you have questions or concerns that the pharmacist cannot address, please call or return to the Emergency Department.     Remember that you can always come back to the Emergency Department if you are not able to see your regular provider in the amount of time listed above, if you get any new symptoms, or if there is anything that worries you.

## 2019-08-10 ENCOUNTER — HOSPITAL ENCOUNTER (EMERGENCY)
Facility: CLINIC | Age: 29
Discharge: HOME | End: 2019-08-11
Attending: EMERGENCY MEDICINE | Admitting: EMERGENCY MEDICINE

## 2019-08-10 VITALS
RESPIRATION RATE: 16 BRPM | DIASTOLIC BLOOD PRESSURE: 70 MMHG | WEIGHT: 176.37 LBS | OXYGEN SATURATION: 98 % | HEART RATE: 91 BPM | SYSTOLIC BLOOD PRESSURE: 117 MMHG | BODY MASS INDEX: 24.6 KG/M2 | TEMPERATURE: 98.3 F

## 2019-08-10 DIAGNOSIS — Q13.2 GLAUCOMA ASSOCIATED WITH ANOMALY OF IRIS: ICD-10-CM

## 2019-08-10 DIAGNOSIS — H40.50X0 GLAUCOMA ASSOCIATED WITH ANOMALY OF IRIS: ICD-10-CM

## 2019-08-10 PROCEDURE — 25000132 ZZH RX MED GY IP 250 OP 250 PS 637: Performed by: EMERGENCY MEDICINE

## 2019-08-10 PROCEDURE — 99283 EMERGENCY DEPT VISIT LOW MDM: CPT

## 2019-08-10 PROCEDURE — 25000125 ZZHC RX 250: Performed by: EMERGENCY MEDICINE

## 2019-08-10 RX ORDER — DORZOLAMIDE HCL 20 MG/ML
1 SOLUTION/ DROPS OPHTHALMIC 3 TIMES DAILY
Status: DISCONTINUED | OUTPATIENT
Start: 2019-08-10 | End: 2019-08-11 | Stop reason: HOSPADM

## 2019-08-10 RX ORDER — BRIMONIDINE TARTRATE 1 MG/ML
1 SOLUTION/ DROPS OPHTHALMIC EVERY 8 HOURS SCHEDULED
Status: DISCONTINUED | OUTPATIENT
Start: 2019-08-10 | End: 2019-08-11 | Stop reason: HOSPADM

## 2019-08-10 RX ORDER — TIMOLOL MALEATE 5 MG/ML
1 SOLUTION/ DROPS OPHTHALMIC DAILY
Status: DISCONTINUED | OUTPATIENT
Start: 2019-08-10 | End: 2019-08-11 | Stop reason: HOSPADM

## 2019-08-10 RX ORDER — LATANOPROST 50 UG/ML
1 SOLUTION/ DROPS OPHTHALMIC DAILY
Status: DISCONTINUED | OUTPATIENT
Start: 2019-08-10 | End: 2019-08-11 | Stop reason: HOSPADM

## 2019-08-10 RX ORDER — TETRACAINE HYDROCHLORIDE 5 MG/ML
SOLUTION OPHTHALMIC
Status: DISCONTINUED
Start: 2019-08-10 | End: 2019-08-10 | Stop reason: HOSPADM

## 2019-08-10 RX ADMIN — BRIMONIDINE TARTRATE 1 DROP: 1 SOLUTION/ DROPS OPHTHALMIC at 23:55

## 2019-08-10 RX ADMIN — LATANOPROST 1 DROP: 50 SOLUTION/ DROPS OPHTHALMIC at 23:55

## 2019-08-10 RX ADMIN — TIMOLOL MALEATE 1 DROP: 5 SOLUTION/ DROPS OPHTHALMIC at 23:55

## 2019-08-10 RX ADMIN — DORZOLAMIDE HYDROCHLORIDE 1 DROP: 20 SOLUTION/ DROPS OPHTHALMIC at 23:55

## 2019-08-10 ASSESSMENT — ENCOUNTER SYMPTOMS
HEADACHES: 1
FEVER: 0
EYE REDNESS: 1

## 2019-08-10 NOTE — ED AVS SNAPSHOT
[facility] Emergency Department  [address]  [facility] [address]  Phone:  [phone]  Fax:  [phone]                                    Scooby Ellington   MRN: [id number]    Department:  [facility] Emergency Department   Date of Visit:  8/10/2019           After Visit Summary Signature Page    I have received my discharge instructions, and my questions have been answered. I have discussed any challenges I see with this plan with the nurse or doctor.    ..........................................................................................................................................  Patient/Patient Representative Signature      ..........................................................................................................................................  Patient Representative Print Name and Relationship to Patient    ..................................................               ................................................  Date                                   Time    ..........................................................................................................................................  Reviewed by Signature/Title    ...................................................              ..............................................  Date                                               Time          22EPIC Rev 08/18

## 2019-08-11 RX ORDER — BRIMONIDINE TARTRATE 1 MG/ML
SOLUTION/ DROPS OPHTHALMIC
Qty: 1 BOTTLE | Refills: 0 | Status: SHIPPED | OUTPATIENT
Start: 2019-08-11 | End: 2022-06-07

## 2019-08-11 RX ORDER — TIMOLOL MALEATE 5 MG/ML
SOLUTION/ DROPS OPHTHALMIC
Qty: 1 BOTTLE | Refills: 0 | Status: SHIPPED | OUTPATIENT
Start: 2019-08-11 | End: 2022-06-07

## 2019-08-11 RX ORDER — LATANOPROST 50 UG/ML
1 SOLUTION/ DROPS OPHTHALMIC 2 TIMES DAILY
Qty: 1 BOTTLE | Refills: 0 | Status: SHIPPED | OUTPATIENT
Start: 2019-08-11 | End: 2023-06-08

## 2019-08-11 RX ORDER — DORZOLAMIDE HCL 20 MG/ML
SOLUTION/ DROPS OPHTHALMIC
Qty: 1 BOTTLE | Refills: 0 | Status: SHIPPED | OUTPATIENT
Start: 2019-08-11 | End: 2022-06-07

## 2019-08-11 NOTE — ED PROVIDER NOTES
History     Chief Complaint:  Eye Problem      HPI   Scooby Ellington is a 28 year old male who presents with eye problem. The patient reports when he was a young child he got hit in the left eye with a rubber band causing him to have his left eye lens removed by [facility]. He reports less than one year ago he went to the Opthamologist for an eye exam and was found to have a high eye pressure of 42. He was started on Latanoprost drops which he was one for four months and then ran out of three weeks. He felt his pressure go back up, and his Opthamalogist started him back on the Latanoprost which he has been taking two times per day. He reports over past five days he has had increasing headaches which occurs when his eye pressure is high. He says tonight his headache is the worst is has been, and his left eye is now bloodshot which is unusual for him. He has been managing symptoms with ibuprofen. He hasn't had any recent vision changes in his left eye. 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 He denies fever and injury. Of note, his ophthalmologist is with [facility].    Allergies:  No Known Drug Allergies        Medications:    Zoloft      Past Medical History:    History reviewed. No pertinent past medical history.       Past Surgical History:    HC remove cataract extracap, insert lens  Laparoscopic Appendectomy       Family History:    Kidney cancer  Depression  Diabetes Type 2  Hypertension  Anxiety      Social History:  The patient was accompanied to the ED by himself.  Smoking Status: Current Every Day Smoker  Smokeless Tobacco: Former Smoker  Alcohol Use: Yes   Marital Status:   [2]       Review of Systems   Constitutional: Negative for fever.   Eyes: Positive for redness (left).   Neurological: Positive for headaches.   All other systems reviewed and are negative.    Physical Exam     Patient Vitals for the past 24 hrs:   BP Temp  Temp src Pulse Resp SpO2 Weight   08/10/19 2245 117/70 -- -- -- -- -- --   08/10/19 2200 -- -- -- -- -- 98 % --   08/10/19 2145 139/84 -- -- -- -- -- --   08/10/19 2127 135/87 98.3  F (36.8  C) Temporal 91 16 99 % 80 kg (176 lb 5.9 oz)        Physical Exam  VS: Reviewed per above  HENT: Mucous membranes moist  EYES: left pupil is dilated and non reactive and eccentric. Sclera injection. EOMI. NO fluorescein uptake of left cornea. No ocular FB noted. VA 20/20 right and 20/400 left (no subjective change in VA). Pressure left eye is 44.  CV: Rate as noted, regular rhythm.   RESP: Effort normal.  NEURO: GCS 15, no facial asymmetry, moving all extremities equally  MSK: No deformity of the extremities  SKIN: Warm and dry    Emergency Department Course     Interventions:  2355 Timoptic 0.5% per 1 drop  2355 Alphagan 0.1% per 1 drop  2355 Trusopt 2% per 1 drop  2355 Xalatan 0.005% per 1 drop       Emergency Department Course:  Nursing notes and vitals reviewed.  2156: I performed an exam of the patient as documented above.    Patient was sent of visual acuity check.    2248 I spoke with Dr. Almeida of the Opthamology service  regarding patient's presentation, findings, and plan of care.     2308 I spoke with Dr. Almeida of the Opthamology service regarding patient's presentation, findings, and plan of care.     2337 Patient rechecked and updated.    2353 I spoke with Dr. Almeida of the Opthamology service regarding patient's presentation, findings, and plan of care.       0001 Patient's pressure down to 28, was 44 on arrival.    0010 Patient rechecked and updated.     Findings and plan explained to the Patient. Patient discharged home with instructions regarding supportive care, medications, and reasons to return. The importance of close follow-up was reviewed.     Impression & Plan      Medical Decision Making:  Pt presents to ER with left eye pain and HA. Exam with left sclera injection and pressure of 44 and VA left eye of  20/400. Concern for angle closure glaucoma. No signs ocular FB, nor corneal ulcer/abrasion. HA seemed to improve with tetracaine actually which was interesting given no signs of acute abnormality to cornea/sclera. Discussed with optho at [facility]. Plan to give eye drops per above and reassess in 15 mins for improvement. After this intervention, pressure dropped to 28 and then 24 another 15 minutes later. Plan to continue latanoprost and f/u with his eye doctor after weekend. He was given the remaining eye drops per above with instructions not to use them unless prescribed by his eye doctor in near future. Close return precautions discussed.       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     Scribe Disclosure:  I, Fany Lewis, am serving as a scribe at 10:07 PM on 8/10/2019 to document services personally performed by Terrell Encarnacion MD based on my observations and the provider's statements to me.    Fany Lewis  8/10/2019   [facility] EMERGENCY DEPARTMENT       Terrell Encarnacion MD  08/11/19 0054

## 2019-08-11 NOTE — ED TRIAGE NOTES
Pt c/o increased pressure in left eye for a few days.  Pt also c/o severe headache for a few hours this evening.  Pt also has increased pain in the eye.

## 2020-10-22 ENCOUNTER — HOSPITAL ENCOUNTER (EMERGENCY)
Facility: CLINIC | Age: 30
Discharge: HOME | End: 2020-10-22
Admitting: EMERGENCY MEDICINE

## 2020-10-22 VITALS
RESPIRATION RATE: 18 BRPM | OXYGEN SATURATION: 100 % | WEIGHT: 199.52 LBS | TEMPERATURE: 98.1 F | HEART RATE: 117 BPM | DIASTOLIC BLOOD PRESSURE: 88 MMHG | BODY MASS INDEX: 27.83 KG/M2 | SYSTOLIC BLOOD PRESSURE: 172 MMHG

## 2020-10-22 PROCEDURE — 999N000104 HC STATISTIC NO CHARGE

## 2020-10-22 PROCEDURE — 250N000011 HC RX IP 250 OP 636: Performed by: EMERGENCY MEDICINE

## 2020-10-22 RX ORDER — ONDANSETRON 4 MG/1
4 TABLET, ORALLY DISINTEGRATING ORAL ONCE
Status: COMPLETED | OUTPATIENT
Start: 2020-10-22 | End: 2020-10-22

## 2020-10-22 RX ADMIN — ONDANSETRON 4 MG: 4 TABLET, ORALLY DISINTEGRATING ORAL at 04:34

## 2020-10-22 NOTE — ED TRIAGE NOTES
Patient presents to ED nausea and vomiting that developed at midnight. Denies diarrhea, abd pain.

## 2021-04-26 ENCOUNTER — OFFICE VISIT (OUTPATIENT)
Dept: FAMILY MEDICINE | Facility: CLINIC | Age: 31
End: 2021-04-26

## 2021-04-26 VITALS
TEMPERATURE: 98.9 F | WEIGHT: 199.2 LBS | HEART RATE: 80 BPM | DIASTOLIC BLOOD PRESSURE: 78 MMHG | SYSTOLIC BLOOD PRESSURE: 127 MMHG | BODY MASS INDEX: 27.78 KG/M2 | OXYGEN SATURATION: 98 %

## 2021-04-26 DIAGNOSIS — Z11.59 NEED FOR HEPATITIS C SCREENING TEST: ICD-10-CM

## 2021-04-26 DIAGNOSIS — Z76.89 ENCOUNTER TO ESTABLISH CARE WITH NEW DOCTOR: Primary | ICD-10-CM

## 2021-04-26 DIAGNOSIS — F17.200 NICOTINE DEPENDENCE, UNCOMPLICATED, UNSPECIFIED NICOTINE PRODUCT TYPE: ICD-10-CM

## 2021-04-26 DIAGNOSIS — F41.9 ANXIETY: ICD-10-CM

## 2021-04-26 DIAGNOSIS — F90.9 ATTENTION DEFICIT HYPERACTIVITY DISORDER (ADHD), UNSPECIFIED ADHD TYPE: ICD-10-CM

## 2021-04-26 DIAGNOSIS — R53.83 FATIGUE, UNSPECIFIED TYPE: ICD-10-CM

## 2021-04-26 DIAGNOSIS — Z11.4 SCREENING FOR HIV (HUMAN IMMUNODEFICIENCY VIRUS): ICD-10-CM

## 2021-04-26 DIAGNOSIS — F32.A DEPRESSION, UNSPECIFIED DEPRESSION TYPE: ICD-10-CM

## 2021-04-26 DIAGNOSIS — F12.11 HISTORY OF CANNABIS ABUSE: ICD-10-CM

## 2021-04-26 LAB
ALBUMIN SERPL-MCNC: 4.2 G/DL (ref 3.4–5)
ALP SERPL-CCNC: 68 U/L (ref 40–150)
ALT SERPL W P-5'-P-CCNC: 27 U/L (ref 0–70)
ANION GAP SERPL CALCULATED.3IONS-SCNC: 2 MMOL/L (ref 3–14)
AST SERPL W P-5'-P-CCNC: 20 U/L (ref 0–45)
BILIRUB SERPL-MCNC: 0.6 MG/DL (ref 0.2–1.3)
BUN SERPL-MCNC: 14 MG/DL (ref 7–30)
CALCIUM SERPL-MCNC: 9.1 MG/DL (ref 8.5–10.1)
CHLORIDE SERPL-SCNC: 107 MMOL/L (ref 94–109)
CO2 SERPL-SCNC: 29 MMOL/L (ref 20–32)
CREAT SERPL-MCNC: 0.99 MG/DL (ref 0.66–1.25)
ERYTHROCYTE [DISTWIDTH] IN BLOOD BY AUTOMATED COUNT: 13.1 % (ref 10–15)
GFR SERPL CREATININE-BSD FRML MDRD: >90 ML/MIN/{1.73_M2}
GLUCOSE SERPL-MCNC: 96 MG/DL (ref 70–99)
HBA1C MFR BLD: 5.6 % (ref 0–5.6)
HCT VFR BLD AUTO: 46.3 % (ref 40–53)
HGB BLD-MCNC: 16.5 G/DL (ref 13.3–17.7)
MCH RBC QN AUTO: 30.4 PG (ref 26.5–33)
MCHC RBC AUTO-ENTMCNC: 35.6 G/DL (ref 31.5–36.5)
MCV RBC AUTO: 85 FL (ref 78–100)
PLATELET # BLD AUTO: 195 10E9/L (ref 150–450)
POTASSIUM SERPL-SCNC: 3.9 MMOL/L (ref 3.4–5.3)
PROT SERPL-MCNC: 7.4 G/DL (ref 6.8–8.8)
RBC # BLD AUTO: 5.42 10E12/L (ref 4.4–5.9)
SODIUM SERPL-SCNC: 138 MMOL/L (ref 133–144)
TSH SERPL DL<=0.005 MIU/L-ACNC: 1.06 MU/L (ref 0.4–4)
WBC # BLD AUTO: 10.1 10E9/L (ref 4–11)

## 2021-04-26 PROCEDURE — 85027 COMPLETE CBC AUTOMATED: CPT | Performed by: FAMILY MEDICINE

## 2021-04-26 PROCEDURE — 99205 OFFICE O/P NEW HI 60 MIN: CPT | Performed by: FAMILY MEDICINE

## 2021-04-26 PROCEDURE — 80053 COMPREHEN METABOLIC PANEL: CPT | Performed by: FAMILY MEDICINE

## 2021-04-26 PROCEDURE — 84443 ASSAY THYROID STIM HORMONE: CPT | Performed by: FAMILY MEDICINE

## 2021-04-26 PROCEDURE — 36415 COLL VENOUS BLD VENIPUNCTURE: CPT | Performed by: FAMILY MEDICINE

## 2021-04-26 PROCEDURE — 83036 HEMOGLOBIN GLYCOSYLATED A1C: CPT | Performed by: FAMILY MEDICINE

## 2021-04-26 RX ORDER — ATROPINE SULFATE 10 MG/ML
1 SOLUTION/ DROPS OPHTHALMIC
COMMUNITY
Start: 2021-03-29 | End: 2022-06-07

## 2021-04-26 RX ORDER — ESCITALOPRAM OXALATE 10 MG/1
10 TABLET ORAL DAILY
Qty: 30 TABLET | Refills: 0 | Status: SHIPPED | OUTPATIENT
Start: 2021-04-26 | End: 2021-05-21

## 2021-04-26 RX ORDER — PREDNISOLONE ACETATE 10 MG/ML
1 SUSPENSION/ DROPS OPHTHALMIC
COMMUNITY
Start: 2021-03-29 | End: 2022-06-07

## 2021-04-26 RX ORDER — ATOMOXETINE 40 MG/1
40 CAPSULE ORAL DAILY
Qty: 30 CAPSULE | Refills: 0 | Status: SHIPPED | OUTPATIENT
Start: 2021-04-26 | End: 2021-05-21

## 2021-04-26 RX ORDER — NEOMYCIN SULFATE, POLYMYXIN B SULFATE, AND DEXAMETHASONE 3.5; 10000; 1 MG/G; [USP'U]/G; MG/G
OINTMENT OPHTHALMIC
COMMUNITY
Start: 2021-04-06 | End: 2022-06-07

## 2021-04-26 RX ORDER — NICOTINE 21 MG/24HR
1 PATCH, TRANSDERMAL 24 HOURS TRANSDERMAL EVERY 24 HOURS
Qty: 28 PATCH | Refills: 2 | Status: SHIPPED | OUTPATIENT
Start: 2021-04-26 | End: 2021-05-24

## 2021-04-26 SDOH — HEALTH STABILITY: MENTAL HEALTH: HOW MANY STANDARD DRINKS CONTAINING ALCOHOL DO YOU HAVE ON A TYPICAL DAY?: NOT ASKED

## 2021-04-26 SDOH — HEALTH STABILITY: MENTAL HEALTH: HOW OFTEN DO YOU HAVE 6 OR MORE DRINKS ON ONE OCCASION?: NOT ASKED

## 2021-04-26 SDOH — HEALTH STABILITY: MENTAL HEALTH: HOW OFTEN DO YOU HAVE A DRINK CONTAINING ALCOHOL?: MONTHLY OR LESS

## 2021-04-26 ASSESSMENT — ANXIETY QUESTIONNAIRES
1. FEELING NERVOUS, ANXIOUS, OR ON EDGE: MORE THAN HALF THE DAYS
4. TROUBLE RELAXING: SEVERAL DAYS
7. FEELING AFRAID AS IF SOMETHING AWFUL MIGHT HAPPEN: SEVERAL DAYS
5. BEING SO RESTLESS THAT IT IS HARD TO SIT STILL: NEARLY EVERY DAY
GAD7 TOTAL SCORE: 12
2. NOT BEING ABLE TO STOP OR CONTROL WORRYING: SEVERAL DAYS
3. WORRYING TOO MUCH ABOUT DIFFERENT THINGS: MORE THAN HALF THE DAYS
GAD7 TOTAL SCORE: 12
7. FEELING AFRAID AS IF SOMETHING AWFUL MIGHT HAPPEN: SEVERAL DAYS
6. BECOMING EASILY ANNOYED OR IRRITABLE: MORE THAN HALF THE DAYS

## 2021-04-26 ASSESSMENT — PATIENT HEALTH QUESTIONNAIRE - PHQ9
SUM OF ALL RESPONSES TO PHQ QUESTIONS 1-9: 19
10. IF YOU CHECKED OFF ANY PROBLEMS, HOW DIFFICULT HAVE THESE PROBLEMS MADE IT FOR YOU TO DO YOUR WORK, TAKE CARE OF THINGS AT HOME, OR GET ALONG WITH OTHER PEOPLE: VERY DIFFICULT
SUM OF ALL RESPONSES TO PHQ QUESTIONS 1-9: 19

## 2021-04-26 NOTE — LETTER
April 27, 2021      Scooby Ellington  [address]  [facility] [address]        Dear ,    We are writing to inform you of your test results.    All labs are normal.  Disregard anion gap, remainder of labs are normal so this is not worrisome.  No obvious source for fatigue, likely combination of mental health symptoms. Follow up as directed.     Resulted Orders   CBC with platelets   Result Value Ref Range    WBC 10.1 4.0 - 11.0 10e9/L    RBC Count 5.42 4.4 - 5.9 10e12/L    Hemoglobin 16.5 13.3 - 17.7 g/dL    Hematocrit 46.3 40.0 - 53.0 %    MCV 85 78 - 100 fl    MCH 30.4 26.5 - 33.0 pg    MCHC 35.6 31.5 - 36.5 g/dL    RDW 13.1 10.0 - 15.0 %    Platelet Count 195 150 - 450 10e9/L   Comprehensive metabolic panel (BMP + Alb, Alk Phos, ALT, AST, Total. Bili, TP)   Result Value Ref Range    Sodium 138 133 - 144 mmol/L    Potassium 3.9 3.4 - 5.3 mmol/L    Chloride 107 94 - 109 mmol/L    Carbon Dioxide 29 20 - 32 mmol/L    Anion Gap 2 (L) 3 - 14 mmol/L    Glucose 96 70 - 99 mg/dL    Urea Nitrogen 14 7 - 30 mg/dL    Creatinine 0.99 0.66 - 1.25 mg/dL    GFR Estimate >90 >60 mL/min/[1.73_m2]      Comment:      Non  GFR Calc  Starting 12/18/2018, serum creatinine based estimated GFR (eGFR) will be   calculated using the Chronic Kidney Disease Epidemiology Collaboration   (CKD-EPI) equation.      GFR Estimate If Black >90 >60 mL/min/[1.73_m2]      Comment:       GFR Calc  Starting 12/18/2018, serum creatinine based estimated GFR (eGFR) will be   calculated using the Chronic Kidney Disease Epidemiology Collaboration   (CKD-EPI) equation.      Calcium 9.1 8.5 - 10.1 mg/dL    Bilirubin Total 0.6 0.2 - 1.3 mg/dL    Albumin 4.2 3.4 - 5.0 g/dL    Protein Total 7.4 6.8 - 8.8 g/dL    Alkaline Phosphatase 68 40 - 150 U/L    ALT 27 0 - 70 U/L    AST 20 0 - 45 U/L   Hemoglobin A1c   Result Value Ref Range    Hemoglobin A1C 5.6 0 - 5.6 %      Comment:      Normal <5.7% Prediabetes  5.7-6.4%  Diabetes 6.5% or higher - adopted from ADA   consensus guidelines.     TSH with free T4 reflex   Result Value Ref Range    TSH 1.06 0.40 - 4.00 mU/L       If you have any questions or concerns, please call the clinic at the number listed above.       Sincerely,      Waleska Santos MD
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
## 2021-04-26 NOTE — PATIENT INSTRUCTIONS
Nicotine Patch    Dosing:    >10 cigarettes per day Dose   Weeks 1-6 Use one 21 mg patch per day.   Weeks 7-8 Use one 14 mg patch per day.   Weeks 9-10 Use one 7 mg patch per day   <10 cigarettes per day  Weeks 1-6 Use one 14 mg patch per day   Weeks 7-8 Use one 7 mg patch per day       How to use the Nicotine Patch:    Apply a new patch to non-hairy, clean, dry skin on the upper body or upper outer arm.  Remove backing from patch and press on skin.  Hold for 10 seconds.    Apply patch around the same time every day.    Wash your hands after applying the patch.    Remove the patch at the end of the day before you go to bed.    Apply a new patch the next morning.    Do not apply the patch to an area where you have worn a patch in the last week.   This will help prevent or reduce skin irritation.    Some Tips:    Do not smoke while you are using the nicotine patch!    Do not cut the nicotine patch -it will be ineffective.    Remove the patch prior to receiving an MRI since the patch may contain some metal.    Do not put the patch on irritated, cut, or burned skin.    If the patch falls off and cannot be reapplied, put on a new patch.    Follow -up with your health care professional if you have any questions and also to help taper your nicotine patch dose.    Side Effects:  Some people experience some skin irritation where the patch was placed.  Moving around the site where you are putting the patch each day should help.  If you experience any other troublesome or unusual side effects, call your health care professional.

## 2021-04-27 ASSESSMENT — PATIENT HEALTH QUESTIONNAIRE - PHQ9: SUM OF ALL RESPONSES TO PHQ QUESTIONS 1-9: 19

## 2021-04-27 ASSESSMENT — ANXIETY QUESTIONNAIRES: GAD7 TOTAL SCORE: 12

## 2021-05-10 ENCOUNTER — TELEPHONE (OUTPATIENT)
Dept: FAMILY MEDICINE | Facility: CLINIC | Age: 31
End: 2021-05-10

## 2021-05-10 NOTE — TELEPHONE ENCOUNTER
Medication Question or Refill    Who is calling: Scooby Ellington    What medication are you calling about (include dose and sig)?: Strattera & Lexapro    Who prescribed the medication?: Dr. Mimi Santos    Do you need a refill? No    When did you use the medication last? Today    Patient offered an appointment? No    Do you have any questions or concerns?  Yes: Pt reports feeling like a new person the first 2 days of taking the medications, but now feels that it's only lasting 1/2 day and feels he's becoming more and more irritable. Pt wants to know if there should be and changes in medications or dosage of current medications.      Okay to leave a detailed message?: Yes at Cell number on file:   Telephone Information:   Mobile [phone]     Routed to Triage / Marlys Chadwick/EMT-B

## 2021-05-19 ENCOUNTER — TELEPHONE (OUTPATIENT)
Dept: FAMILY MEDICINE | Facility: CLINIC | Age: 31
End: 2021-05-19

## 2021-05-19 NOTE — TELEPHONE ENCOUNTER
Drug Change Request    Who is calling?:  Scooby Ellington    What is the current medication?:  Strattera & Lexapro    What alternative is being requested?: change in dosage being requested    Why the request to change?:  Pt reported medication was effective at the start, but feels symptoms are coming back stronger than before.     Okay to leave a detailed message?:  YES at Cell number on file:    Telephone Information:   Mobile [phone]       Routed to Provider / Marlys Chadwick/EMT-B

## 2021-05-24 ENCOUNTER — VIRTUAL VISIT (OUTPATIENT)
Dept: FAMILY MEDICINE | Facility: CLINIC | Age: 31
End: 2021-05-24

## 2021-05-24 VITALS — HEIGHT: 70 IN | WEIGHT: 196 LBS | HEART RATE: 94 BPM | BODY MASS INDEX: 28.06 KG/M2 | RESPIRATION RATE: 19 BRPM

## 2021-05-24 DIAGNOSIS — F32.A DEPRESSION, UNSPECIFIED DEPRESSION TYPE: ICD-10-CM

## 2021-05-24 DIAGNOSIS — F41.9 ANXIETY: ICD-10-CM

## 2021-05-24 DIAGNOSIS — F17.200 NICOTINE DEPENDENCE, UNCOMPLICATED, UNSPECIFIED NICOTINE PRODUCT TYPE: Primary | ICD-10-CM

## 2021-05-24 DIAGNOSIS — F90.9 ATTENTION DEFICIT HYPERACTIVITY DISORDER (ADHD), UNSPECIFIED ADHD TYPE: ICD-10-CM

## 2021-05-24 PROCEDURE — 99214 OFFICE O/P EST MOD 30 MIN: CPT | Mod: 95 | Performed by: FAMILY MEDICINE

## 2021-05-24 RX ORDER — ATOMOXETINE 80 MG/1
80 CAPSULE ORAL DAILY
Qty: 30 CAPSULE | Refills: 1 | Status: SHIPPED | OUTPATIENT
Start: 2021-05-24 | End: 2021-06-21 | Stop reason: DRUGHIGH

## 2021-05-24 RX ORDER — ESCITALOPRAM OXALATE 20 MG/1
20 TABLET ORAL DAILY
Qty: 30 TABLET | Refills: 1 | Status: SHIPPED | OUTPATIENT
Start: 2021-05-24 | End: 2021-06-16

## 2021-05-24 ASSESSMENT — ANXIETY QUESTIONNAIRES
6. BECOMING EASILY ANNOYED OR IRRITABLE: NEARLY EVERY DAY
5. BEING SO RESTLESS THAT IT IS HARD TO SIT STILL: NEARLY EVERY DAY
1. FEELING NERVOUS, ANXIOUS, OR ON EDGE: MORE THAN HALF THE DAYS
7. FEELING AFRAID AS IF SOMETHING AWFUL MIGHT HAPPEN: NOT AT ALL
3. WORRYING TOO MUCH ABOUT DIFFERENT THINGS: SEVERAL DAYS
2. NOT BEING ABLE TO STOP OR CONTROL WORRYING: SEVERAL DAYS
GAD7 TOTAL SCORE: 12
IF YOU CHECKED OFF ANY PROBLEMS ON THIS QUESTIONNAIRE, HOW DIFFICULT HAVE THESE PROBLEMS MADE IT FOR YOU TO DO YOUR WORK, TAKE CARE OF THINGS AT HOME, OR GET ALONG WITH OTHER PEOPLE: SOMEWHAT DIFFICULT

## 2021-05-24 ASSESSMENT — PATIENT HEALTH QUESTIONNAIRE - PHQ9
SUM OF ALL RESPONSES TO PHQ QUESTIONS 1-9: 14
5. POOR APPETITE OR OVEREATING: MORE THAN HALF THE DAYS

## 2021-05-24 ASSESSMENT — MIFFLIN-ST. JEOR: SCORE: 1845.3
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
## 2021-05-25 ASSESSMENT — ANXIETY QUESTIONNAIRES: GAD7 TOTAL SCORE: 12

## 2021-05-27 ENCOUNTER — TELEPHONE (OUTPATIENT)
Dept: FAMILY MEDICINE | Facility: CLINIC | Age: 31
End: 2021-05-27

## 2021-05-27 DIAGNOSIS — F90.9 ATTENTION DEFICIT HYPERACTIVITY DISORDER (ADHD), UNSPECIFIED ADHD TYPE: ICD-10-CM

## 2021-05-27 NOTE — TELEPHONE ENCOUNTER
Called patient to clarify.  States [facility] is cheaper.  Advised to have [facility] call [facility] to transfer.  Patient agrees with plan.  Na Au RN

## 2021-05-27 NOTE — TELEPHONE ENCOUNTER
Reason for call:  Medication   If this is a refill request, has the caller requested the refill from the pharmacy already? No  Will the patient be using a [facility]? No  Name of the pharmacy and phone number for the current request:   [facility] [phone]    Name of the medication requested: Strattera     Other request: request for transfer of prescription to new pharmacy    Phone number to reach patient:  Cell number on file:    Telephone Information:   Mobile [phone]       Best Time:  any    Can we leave a detailed message on this number?  YES    Travel screening: Negative

## 2021-05-28 RX ORDER — ATOMOXETINE 80 MG/1
CAPSULE ORAL
Qty: 90 CAPSULE | Refills: 1 | OUTPATIENT
Start: 2021-05-28

## 2021-06-16 DIAGNOSIS — F41.9 ANXIETY: ICD-10-CM

## 2021-06-16 DIAGNOSIS — F32.A DEPRESSION, UNSPECIFIED DEPRESSION TYPE: ICD-10-CM

## 2021-06-16 RX ORDER — ESCITALOPRAM OXALATE 20 MG/1
TABLET ORAL
Qty: 30 TABLET | Refills: 1 | Status: SHIPPED | OUTPATIENT
Start: 2021-06-16 | End: 2021-07-02

## 2021-06-21 ENCOUNTER — VIRTUAL VISIT (OUTPATIENT)
Dept: FAMILY MEDICINE | Facility: CLINIC | Age: 31
End: 2021-06-21

## 2021-06-21 DIAGNOSIS — F90.9 ATTENTION DEFICIT HYPERACTIVITY DISORDER (ADHD), UNSPECIFIED ADHD TYPE: ICD-10-CM

## 2021-06-21 DIAGNOSIS — F41.9 ANXIETY: ICD-10-CM

## 2021-06-21 DIAGNOSIS — F32.A DEPRESSION, UNSPECIFIED DEPRESSION TYPE: Primary | ICD-10-CM

## 2021-06-21 PROCEDURE — 99214 OFFICE O/P EST MOD 30 MIN: CPT | Mod: 95 | Performed by: FAMILY MEDICINE

## 2021-06-21 RX ORDER — ATOMOXETINE 100 MG/1
100 CAPSULE ORAL DAILY
Qty: 30 CAPSULE | Refills: 5 | Status: SHIPPED | OUTPATIENT
Start: 2021-06-21 | End: 2022-02-10 | Stop reason: ALTCHOICE

## 2021-06-21 ASSESSMENT — ANXIETY QUESTIONNAIRES
3. WORRYING TOO MUCH ABOUT DIFFERENT THINGS: SEVERAL DAYS
6. BECOMING EASILY ANNOYED OR IRRITABLE: NOT AT ALL
1. FEELING NERVOUS, ANXIOUS, OR ON EDGE: SEVERAL DAYS
GAD7 TOTAL SCORE: 3
5. BEING SO RESTLESS THAT IT IS HARD TO SIT STILL: SEVERAL DAYS
IF YOU CHECKED OFF ANY PROBLEMS ON THIS QUESTIONNAIRE, HOW DIFFICULT HAVE THESE PROBLEMS MADE IT FOR YOU TO DO YOUR WORK, TAKE CARE OF THINGS AT HOME, OR GET ALONG WITH OTHER PEOPLE: NOT DIFFICULT AT ALL
7. FEELING AFRAID AS IF SOMETHING AWFUL MIGHT HAPPEN: NOT AT ALL
2. NOT BEING ABLE TO STOP OR CONTROL WORRYING: NOT AT ALL

## 2021-06-21 ASSESSMENT — PATIENT HEALTH QUESTIONNAIRE - PHQ9
5. POOR APPETITE OR OVEREATING: NOT AT ALL
SUM OF ALL RESPONSES TO PHQ QUESTIONS 1-9: 4

## 2021-06-21 NOTE — LETTER
My Depression Action Plan  Name: Scooby Ellington   Date of Birth 1990  Date: 6/21/2021    My doctor: Waleska Maria   My clinic: [address] [address] [phone]          GREEN    ZONE   Good Control    What it looks like:     Things are going generally well. You have normal ups and downs. You may even feel depressed from time to time, but bad moods usually last less than a day.   What you need to do:  1. Continue to care for yourself (see self care plan)  2. Check your depression survival kit and update it as needed  3. Follow your physician s recommendations including any medication.  4. Do not stop taking medication unless you consult with your physician first.           YELLOW         ZONE Getting Worse    What it looks like:     Depression is starting to interfere with your life.     It may be hard to get out of bed; you may be starting to isolate yourself from others.    Symptoms of depression are starting to last most all day and this has happened for several days.     You may have suicidal thoughts but they are not constant.   What you need to do:     1. Call your care team. Your response to treatment will improve if you keep your care team informed of your progress. Yellow periods are signs an adjustment may need to be made.     2. Continue your self-care.  Just get dressed and ready for the day.  Don't give yourself time to talk yourself out of it.    3. Talk to someone in your support network.    4. Open up your Depression Self-Care Plan/Wellness Kit.           RED    ZONE Medical Alert - Get Help    What it looks like:     Depression is seriously interfering with your life.     You may experience these or other symptoms: You can t get out of bed most days, can t work or engage in other necessary activities, you have trouble taking care of basic hygiene, or basic responsibilities, thoughts of suicide or  death that will not go away, self-injurious behavior.     What you need to do:  1. Call your care team and request a same-day appointment. If they are not available (weekends or after hours) call your local crisis line, emergency room or 911.          Depression Self-Care Plan / Wellness Kit    Many people find that medication and therapy are helpful treatments for managing depression. In addition, making small changes to your everyday life can help to boost your mood and improve your wellbeing. Below are some tips for you to consider. Be sure to talk with your medical provider and/or behavioral health consultant if your symptoms are worsening or not improving.     Sleep   Sleep hygiene  means all of the habits that support good, restful sleep. It includes maintaining a consistent bedtime and wake time, using your bedroom only for sleeping or sex, and keeping the bedroom dark and free of distractions like a computer, smartphone, or television.     Develop a Healthy Routine  Maintain good hygiene. Get out of bed in the morning, make your bed, brush your teeth, take a shower, and get dressed. Don t spend too much time viewing media that makes you feel stressed. Find time to relax each day.    Exercise  Get some form of exercise every day. This will help reduce pain and release endorphins, the  feel good  chemicals in your brain. It can be as simple as just going for a walk or doing some gardening, anything that will get you moving.      Diet  Strive to eat healthy foods, including fruits and vegetables. Drink plenty of water. Avoid excessive sugar, caffeine, alcohol, and other mood-altering substances.     Stay Connected with Others  Stay in touch with friends and family members.    Manage Your Mood  Try deep breathing, massage therapy, biofeedback, or meditation. Take part in fun activities when you can. Try to find something to smile about each day.     Psychotherapy  Be open to working with a therapist if your  provider recommends it.     Medication  Be sure to take your medication as prescribed. Most anti-depressants need to be taken every day. It usually takes several weeks for medications to work. Not all medicines work for all people. It is important to follow-up with your provider to make sure you have a treatment plan that is working for you. Do not stop your medication abruptly without first discussing it with your provider.    Crisis Resources   These hotlines are for both adults and children. They and are open 24 hours a day, 7 days a week unless noted otherwise.      National Suicide Prevention Lifeline   5-139-355-RVIG (1219)      Crisis Text Line    www.crisistextline.org  Text HOME to 871171 from anywhere in the [place], anytime, about any type of crisis. A live, trained crisis counselor will receive the text and respond quickly.      [place] Lifeline for LGBTQ Youth  A national crisis intervention and suicide lifeline for LGBTQ youth under 25. Provides a safe place to talk without judgement. Call 1-249.134.9169; text START to 520145 or visit www.thetrevorproject.org to talk to a trained counselor.      For [facility] crisis numbers, visit the [facility] website at:  https://[place].gov/dhs/people-we-serve/adults/health-care/mental-health/resources/crisis-contacts.jsp

## 2021-06-21 NOTE — PROGRESS NOTES
"Scooby is a 30 year old who is being evaluated via a billable video visit.      How would you like to obtain your AVS? Mail a copy none  P twill be using Cell Phone, please send text with link to: [phone]   Will anyone else be joining your video visit? No    Video Start Time: 1:30 pm    Assessment & Plan     Depression, unspecified depression type  Stable, well controlled.  Continue Lexapro 20 mg daily.  Follow up in 6 months or sooner as needed.    Anxiety  Stable, well controlled, Continue Lexapro, follow up in 6 months or sooner as needed.    Attention deficit hyperactivity disorder (ADHD), unspecified ADHD type  Increase to 100 mg daily.  Follow up in 6 months or sooner as needed.  If not improved, follow up sooner.  Discussed trial of Bupropion, stimulants as last resort given historyo.  - atomoxetine (STRATTERA) 100 MG capsule; Take 1 capsule (100 mg) by mouth daily    22 minutes spent on the date of the encounter doing chart review, history and exam, documentation and further activities per the note     BMI:   Estimated body mass index is 28.12 kg/m  as calculated from the following:    Height as of 5/24/21: 1.778 m (5' 10\").    Weight as of 5/24/21: 88.9 kg (196 lb).       See Patient Instructions    Return in about 6 months (around 12/21/2021) for ADHD Medication Recheck, Recheck Depression/Anxiety.    Waleska Santos MD  [facility]   Scooby is a 30 year old who presents for the following health issues     HPI   ADHD Follow-Up (Adult)  Concerns: None  Changes since last visit: Stable  Taking controlled (daily) medications as prescribed: Yes  Sleep: no problems  Adult ADHD Self-Reporting form given to patient?:  No  Currently in counseling: Yes    Medication Benefits:   Controlled symptoms: Hyperactivity - motor restlessness, Attention span, Distractability, Finishing tasks, Impulse control, Frustration tolerance and Accepting limits  Uncontrolled " symptoms:  None    Medication Side Effects:  Reports:  none  Sleep Problems? no  ++++++++++++++++++++++++++++++++++++++++++++++++    Employer Concerns/Feedback: Stable  Coworker Concerns:   Stable  Home/Family Concerns: Stable    Strattera was increased to 80 mg daily.  Seems to be working alright, has mostly good days, today is having some trouble, misplacing things.  Some days that he was more talkative and all over the place.  Feels that things are to be expected.    Depression and Anxiety Follow-Up    How are you doing with your depression since your last visit? Improved     How are you doing with your anxiety since your last visit?  Improved / a lot better    Are you having other symptoms that might be associated with depression or anxiety? No    Have you had a significant life event? No     Do you have any concerns with your use of alcohol or other drugs? No    Lexapro was increased at last visit to 20 mg daily.  Feels like things are going well, had a couple days that were tough prior to a court date, but overall doing well.    Current Outpatient Medications   Medication Sig Dispense Refill     atomoxetine (STRATTERA) 100 MG capsule Take 1 capsule (100 mg) by mouth daily 30 capsule 5     atropine 1 % ophthalmic solution Apply 1 drop to eye       brimonidine (ALPHAGAN P) 0.1 % ophthalmic solution Do not use unless instructed by eye doctor 1 Bottle 0     dorzolamide (TRUSOPT) 2 % ophthalmic solution Do not use unless instructed by your eye doctor 1 Bottle 0     escitalopram (LEXAPRO) 20 MG tablet TAKE 1 TABLET BY MOUTH EVERY DAY 30 tablet 1     latanoprost (XALATAN) 0.005 % ophthalmic solution Place 1 drop Into the left eye 2 times daily 1 Bottle 0     neomycin-polymyxin-dexamethasone (MAXITROL) 3.5-71682-2.1 ophthalmic ointment APPLY 1 APPLICATION IN LEFT EYE THREE TIMES A DAY       prednisoLONE acetate (PRED FORTE) 1 % ophthalmic suspension Apply 1 drop to eye       timolol maleate (TIMOPTIC) 0.5 % ophthalmic  "solution Do not use unless instructed by your eye doctor 1 Bottle 0       Social History     Tobacco Use     Smoking status: Current Every Day Smoker     Packs/day: 0.50     Years: 5.00     Pack years: 2.50     Types: Cigarettes     Smokeless tobacco: Former User   Substance Use Topics     Alcohol use: Yes     Frequency: Monthly or less     Comment: occasionally     Drug use: Not Currently     Types: Marijuana, Cocaine     Comment: 60 days sober as of 6/20/21     PHQ 4/26/2021 5/24/2021 6/21/2021   PHQ-9 Total Score 19 14 4   Q9: Thoughts of better off dead/self-harm past 2 weeks Several days Not at all Not at all   F/U: Thoughts of suicide or self-harm Yes - -   F/U: Self harm-plan No - -   F/U: Self-harm action No - -   F/U: Safety concerns No - -     ISABEL-7 SCORE 4/26/2021 5/24/2021 6/21/2021   Total Score 12 (moderate anxiety) - -   Total Score 12 12 3         Suicide Assessment Five-step Evaluation and Treatment (SAFE-T)      How many servings of fruits and vegetables do you eat daily?  2-3    On average, how many sweetened beverages do you drink each day (Examples: soda, juice, sweet tea, etc.  Do NOT count diet or artificially sweetened beverages)?   0    How many days per week do you exercise enough to make your heart beat faster? 5    How many minutes a day do you exercise enough to make your heart beat faster? 60 or more  How many days per week do you miss taking your medication? 1    What makes it hard for you to take your medications?  2 x within last month - just got busy with family stuff        Review of Systems   Constitutional, HEENT, cardiovascular, pulmonary, gi and gu systems are negative, except as otherwise noted.      Objective    Vitals - Patient Reported  Weight (Patient Reported): 88.9 kg (196 lb)  Height (Patient Reported): 180.3 cm (5' 11\")  BMI (Based on Pt Reported Ht/Wt): 27.34  Pulse (Patient Reported): 93      Vitals:  No vitals were obtained today due to virtual visit.    Physical " Exam   GENERAL: Healthy, alert and no distress  EYES: Eyes grossly normal to inspection.  No discharge or erythema, or obvious scleral/conjunctival abnormalities.  RESP: No audible wheeze, cough, or visible cyanosis.  No visible retractions or increased work of breathing.    SKIN: Visible skin clear. No significant rash, abnormal pigmentation or lesions.  NEURO: Cranial nerves grossly intact.  Mentation and speech appropriate for age.  PSYCH: Mentation appears normal, affect normal/bright, judgement and insight intact, normal speech and appearance well-groomed.            Video-Visit Details    Type of service:  Video Visit    Video End Time:1:45 pm    Originating Location (pt. Location): Home    Distant Location (provider location):  [facility]     Platform used for Video Visit: DoryWell

## 2021-06-22 ASSESSMENT — ANXIETY QUESTIONNAIRES: GAD7 TOTAL SCORE: 3

## 2021-07-02 DIAGNOSIS — F41.9 ANXIETY: ICD-10-CM

## 2021-07-02 DIAGNOSIS — F32.A DEPRESSION, UNSPECIFIED DEPRESSION TYPE: ICD-10-CM

## 2021-07-02 RX ORDER — ESCITALOPRAM OXALATE 20 MG/1
TABLET ORAL
Qty: 90 TABLET | Refills: 0 | Status: SHIPPED | OUTPATIENT
Start: 2021-07-02 | End: 2021-11-23

## 2021-07-02 NOTE — TELEPHONE ENCOUNTER
Patient requesting 90 day supply. Prescription approved per [facility] Refill Protocol.  Adolfo VILLANUEVA RN

## 2021-07-05 DIAGNOSIS — F90.9 ATTENTION DEFICIT HYPERACTIVITY DISORDER (ADHD), UNSPECIFIED ADHD TYPE: ICD-10-CM

## 2021-07-06 RX ORDER — ATOMOXETINE 100 MG/1
CAPSULE ORAL
Qty: 90 CAPSULE | Refills: 2 | OUTPATIENT
Start: 2021-07-06

## 2021-07-06 NOTE — TELEPHONE ENCOUNTER
Should have refills on file  E-Prescribing Status: Receipt confirmed by pharmacy (6/21/2021  1:43 PM CDT)

## 2021-07-07 NOTE — TELEPHONE ENCOUNTER
atomoxetine (STRATTERA) 100 MG capsule 30 capsule 5 6/21/2021  --   Sig - Route: Take 1 capsule (100 mg) by mouth daily - Oral   Sent to pharmacy as: Atomoxetine HCl 100 MG Oral Capsule (STRATTERA)   Class: E-Prescribe   Order: [id number]   E-Prescribing Status: Receipt confirmed by pharmacy (6/21/2021  1:43 PM CDT)     Prescription shows as received by pharmacy.     Adolfo VILLANUEVA RN

## 2021-11-23 ENCOUNTER — OFFICE VISIT (OUTPATIENT)
Dept: FAMILY MEDICINE | Facility: CLINIC | Age: 31
End: 2021-11-23

## 2021-11-23 VITALS
OXYGEN SATURATION: 96 % | WEIGHT: 205.6 LBS | DIASTOLIC BLOOD PRESSURE: 83 MMHG | HEART RATE: 88 BPM | HEIGHT: 71 IN | TEMPERATURE: 99.3 F | SYSTOLIC BLOOD PRESSURE: 120 MMHG | BODY MASS INDEX: 28.78 KG/M2

## 2021-11-23 DIAGNOSIS — F33.2 SEVERE EPISODE OF RECURRENT MAJOR DEPRESSIVE DISORDER, WITHOUT PSYCHOTIC FEATURES (H): Primary | ICD-10-CM

## 2021-11-23 DIAGNOSIS — F90.9 ATTENTION DEFICIT HYPERACTIVITY DISORDER (ADHD), UNSPECIFIED ADHD TYPE: ICD-10-CM

## 2021-11-23 DIAGNOSIS — F41.9 ANXIETY: ICD-10-CM

## 2021-11-23 DIAGNOSIS — F12.11 HISTORY OF CANNABIS ABUSE: ICD-10-CM

## 2021-11-23 PROCEDURE — 99215 OFFICE O/P EST HI 40 MIN: CPT | Performed by: FAMILY MEDICINE

## 2021-11-23 RX ORDER — BUPROPION HYDROCHLORIDE 150 MG/1
150 TABLET ORAL EVERY MORNING
Qty: 30 TABLET | Refills: 1 | Status: SHIPPED | OUTPATIENT
Start: 2021-11-23 | End: 2022-02-10

## 2021-11-23 RX ORDER — HYDROXYZINE PAMOATE 25 MG/1
25 CAPSULE ORAL 3 TIMES DAILY PRN
Qty: 30 CAPSULE | Refills: 0 | Status: SHIPPED | OUTPATIENT
Start: 2021-11-23 | End: 2022-02-10 | Stop reason: DRUGHIGH

## 2021-11-23 ASSESSMENT — PATIENT HEALTH QUESTIONNAIRE - PHQ9: 5. POOR APPETITE OR OVEREATING: NEARLY EVERY DAY

## 2021-11-23 ASSESSMENT — ANXIETY QUESTIONNAIRES
3. WORRYING TOO MUCH ABOUT DIFFERENT THINGS: NEARLY EVERY DAY
1. FEELING NERVOUS, ANXIOUS, OR ON EDGE: NEARLY EVERY DAY
2. NOT BEING ABLE TO STOP OR CONTROL WORRYING: NEARLY EVERY DAY
GAD7 TOTAL SCORE: 20
6. BECOMING EASILY ANNOYED OR IRRITABLE: NEARLY EVERY DAY
IF YOU CHECKED OFF ANY PROBLEMS ON THIS QUESTIONNAIRE, HOW DIFFICULT HAVE THESE PROBLEMS MADE IT FOR YOU TO DO YOUR WORK, TAKE CARE OF THINGS AT HOME, OR GET ALONG WITH OTHER PEOPLE: EXTREMELY DIFFICULT
7. FEELING AFRAID AS IF SOMETHING AWFUL MIGHT HAPPEN: MORE THAN HALF THE DAYS
5. BEING SO RESTLESS THAT IT IS HARD TO SIT STILL: NEARLY EVERY DAY

## 2021-11-23 ASSESSMENT — MIFFLIN-ST. JEOR: SCORE: 1899.73

## 2021-11-23 NOTE — LETTER
November 23, 2021      Scooby M Chandana  [address]  [place] [address]        To Whom It May Concern:    Scooby Ellington was seen in our clinic. He may return to work without restrictions.      Sincerely,          Waleska Santos MD

## 2021-11-23 NOTE — Clinical Note
Patient has a counselor currently but has never been formally diagnosed with depression, anxiety, etc.  Concerns for possible bipolar disorder.    Wondering if we can get him in for just diagnostics?

## 2021-11-23 NOTE — PROGRESS NOTES
"  Assessment & Plan     Severe episode of recurrent major depressive disorder, without psychotic features (H)  Will try patient on Bupropion in hopes of addressing both depression and ADHD.  Would like to see how patient does on this medication.  Aware that his anxiety could potentially get worse, however we question if it is related to ADHD.  Also question if patient has Bipolar Disorder, as he has not formally undergone testing for diagnoses.  He will reach out to his counselor for diagnostic assessment, and we will also reach out to Louisa Nj in our clinic.  Follow up in 6 weeks or sooner as needed.  - buPROPion (WELLBUTRIN XL) 150 MG 24 hr tablet; Take 1 tablet (150 mg) by mouth every morning    Anxiety  As above.  Will try Hydroxyzine PRN and for sleep at night to see how he does.  Hydroxyzine, 25 mg TID PRN    Attention deficit hyperactivity disorder (ADHD), unspecified ADHD type  As above  - buPROPion (WELLBUTRIN XL) 150 MG 24 hr tablet; Take 1 tablet (150 mg) by mouth every morning    History of cannabis abuse  Encouraged cessation      42 minutes spent on the date of the encounter doing chart review, history and exam, documentation and further activities per the note       BMI:   Estimated body mass index is 28.68 kg/m  as calculated from the following:    Height as of this encounter: 1.803 m (5' 11\").    Weight as of this encounter: 93.3 kg (205 lb 9.6 oz).     Depression Screening Follow Up    PHQ 11/23/2021   PHQ-9 Total Score 25   Q9: Thoughts of better off dead/self-harm past 2 weeks Several days   F/U: Thoughts of suicide or self-harm -   F/U: Self harm-plan -   F/U: Self-harm action -   F/U: Safety concerns -       Follow Up  Follow Up Actions Taken  Crisis resource information provided in the After Visit Summary  Safety discussed with patient, he feels okay to go home.  No SI or HI.    Discussed the following ways the patient can remain in a safe environment:  remove drugs and be around " others  See Patient Instructions    Return in about 6 weeks (around 1/4/2022) for Recheck Depression/Anxiety.    Waleska Santos MD  [facility]    Fifi Almodovar is a 31 year old who presents for the following health issues     HPI     Headache  Onset of symptoms was 6day(s).  Course of illness is same  Severity moderate  Character of pain:throbbing   Current and associated symptoms: nausea, light sensitivity and neck stiffness  Location of pain: occipital  History of Migranes: No  Predisposing factors: None  Treatment and measures tried: Ibuprofen        Fatigue  Patient complains of fatigue. Symptoms began. 6 Days agp   The patient feels the fatigue began with: Mood changes.   Symptoms of his fatigue have been anxiousness, change in appetite, diffuse soft tissue aches and pains, fatigue with paradoxical insomnia, feelings of depression, general malaise, headaches and lack of interest in usual activities.   Symptom severity: struggles to carry out day to day responsibilities..  Previous visits for this problem: none.       Strattera: reports that  It did not seem to be working after a few weeks. Stopped taking it without tapering, no problems or side effects. Continues to be distractible, unfocused, etc.    Depression and anxiety: Lexapro stopped about 10 days ago, seems like he is going backwards in progress.  Says that his screening test scores would have been the same 10 days ago while taking the Lexapro, symptoms have not gotten significantly worse since stopping.  Feels angry all the time, having problems with his wife, arguing all the time.  Very irritable.  Has started smoking marijuana again, self-medicating, but does not want to use.  Has a history of marijuana abuse.  All he wants to do is sleep, but he has trouble sleeping, has had a headache for a few days, temp was 99.0 F, has some hot and cold waves.  Not sure if he is getting sick.  Overall mood is terrible, no  "thoughts of harming self or others however.    Review of Systems         Objective    /83 (BP Location: Right arm, Patient Position: Sitting, Cuff Size: Adult Large)   Pulse 88   Temp 99.3  F (37.4  C) (Oral)   Ht 1.803 m (5' 11\")   Wt 93.3 kg (205 lb 9.6 oz)   SpO2 96%   BMI 28.68 kg/m    Body mass index is 28.68 kg/m .  Physical Exam   GENERAL: healthy, alert and no distress  PSYCH: mentation appears normal, tearful, anxious, fatigued, judgement and insight intact and appearance well groomed            "

## 2021-11-23 NOTE — PATIENT INSTRUCTIONS
Patient Education     Bupropion HCl 12 HR Extended Release Tablet 150 mg  Uses  This medicine is used for the following purposes:    attention deficit hyperactivity disorder    depression    eating disorders    stop smoking  Instructions  Swallow the medicine without crushing or chewing it.  This medicine may be taken with or without food.  Try to avoid taking the medicine at bedtime.  Keep the medicine at room temperature. Avoid heat and direct light.  It may take several weeks for this medicine to fully work.  It is important that you keep taking each dose of this medicine on time even if you are feeling well.  If you forget to take a dose on time, take it as soon as you remember. If it is almost time for the next dose, do not take the missed dose. Return to your normal dosing schedule. Do not take 2 doses of this medicine at one time.  Please tell your doctor and pharmacist about all the medicines you take. Include both prescription and over-the-counter medicines. Also tell them about any vitamins, herbal medicines, or anything else you take for your health.  Do not suddenly stop taking this medicine. Check with your doctor before stopping.  Cautions  Tell your doctor and pharmacist if you ever had an allergic reaction to a medicine. Symptoms of an allergic reaction can include trouble breathing, skin rash, itching, swelling, or severe dizziness.  This medicine is associated with an increased risk for seizures. Please ask your doctor whether you may be at risk for having a seizure while on this medicine.  Do not use the medication any more than instructed.  Your ability to stay alert or to react quickly may be impaired by this medicine. Do not drive or operate machinery until you know how this medicine will affect you.  Please check with your doctor before drinking alcohol while on this medicine.  Family should check on the patient often. Call the doctor if patient becomes more depressed, has thoughts of  suicide, or shows changes in behavior.  Call the doctor if there are any signs of confusion or unusual changes in behavior.  Tell the doctor or pharmacist if you are pregnant, planning to be pregnant, or breastfeeding.  Ask your pharmacist if this medicine can interact with any of your other medicines. Be sure to tell them about all the medicines you take.  Do not start or stop any other medicines without first speaking to your doctor or pharmacist.  Do not share this medicine with anyone who has not been prescribed this medicine.  This medicine is associated with increased risk of death in certain patients. Please speak with your doctor about the benefits and risks of using this medicine.  This medicine can cause serious side effects in some patients. Important information from the [place] Food and Drug Administration (FDA) is available from your pharmacist. Please review it carefully with your pharmacist to understand the risks associated with this medicine.  Side Effects  The following is a list of some common side effects from this medicine. Please speak with your doctor about what you should do if you experience these or other side effects.    agitated feeling or trouble sleeping    decreased appetite    increased appetite    constipation    dizziness    drowsiness or sedation    dry mouth    headaches    high blood pressure    itching    muscle pain    nausea    skin irritation such as redness, itching, rash, or burning    problems with sexual functions or desire    sore throat    stomach upset or abdominal pain    sweating    vomiting    weight loss  If you have any of the following side effects, you may be getting too much medicine. Please contact your doctor to let them know about these side effects.    change in behavior    confusion    diarrhea    fainting    hallucinations (unusual thoughts, seeing or hearing things that are not real)    rapid heartbeat    pain in the joints    tight or rigid  muscles    muscle trembling  Call your doctor or get medical help right away if you notice any of these more serious side effects:    chest pain    fast or irregular heart beats    dilation of the pupils    seizures    shortness of breath  A few people may have an allergic reactions to this medicine. Symptoms can include difficulty breathing, skin rash, itching, swelling, or severe dizziness. If you notice any of these symptoms, seek medical help quickly.  Extra  Please speak with your doctor, nurse, or pharmacist if you have any questions about this medicine.  [email or web address]  IMPORTANT NOTE: This document tells you briefly how to take your medicine, but it does not tell you all there is to know about it.Your doctor or pharmacist may give you other documents about your medicine. Please talk to them if you have any questions.Always follow their advice. There is a more complete description of this medicine available in English.Scan this code on your smartphone or tablet or use the web address below. You can also ask your pharmacist for a printout. If you have any questions, please ask your pharmacist.     2021 [employer].

## 2021-11-24 ENCOUNTER — MYC MEDICAL ADVICE (OUTPATIENT)
Dept: FAMILY MEDICINE | Facility: CLINIC | Age: 31
End: 2021-11-24

## 2021-11-24 ASSESSMENT — PATIENT HEALTH QUESTIONNAIRE - PHQ9: SUM OF ALL RESPONSES TO PHQ QUESTIONS 1-9: 25

## 2021-11-24 ASSESSMENT — ANXIETY QUESTIONNAIRES: GAD7 TOTAL SCORE: 20

## 2021-11-24 NOTE — LETTER
November 26, 2021      Scooby Ellington  [address]  [place] [address]        To Whom It May Concern:    Scooby Ellington is a patient in my clinic.  Please excuse him from work from 11-23-21 to 11-28-21 due to illness.        Sincerely,          Waleska Santos MD

## 2021-11-24 NOTE — LETTER
November 24, 2021      Scooby Ellington  [address]  [facility] [facility] [address]        To Whom It May Concern,     Please excuse Scooby from work 11/24/21.    Sincerely,        Waleska Santos MD

## 2021-11-24 NOTE — LETTER
November 24, 2021      Scooby Ellington  [address]  [place] [address]        To Whom It May Concern:    Scooby Ellington is a patient in my clinic.  Please excuse him from work on 11- due to medical concerns.        Sincerely,        Waleska Santos MD

## 2021-11-30 ENCOUNTER — TELEPHONE (OUTPATIENT)
Dept: BEHAVIORAL HEALTH | Facility: CLINIC | Age: 31
End: 2021-11-30

## 2021-11-30 NOTE — TELEPHONE ENCOUNTER
Writer left message for pt, per PCP request.  Will send MyChart message.    DANGELO Kent, [facility]  Behavioral Health Clinician

## 2021-12-19 ENCOUNTER — HEALTH MAINTENANCE LETTER (OUTPATIENT)
Age: 31
End: 2021-12-19

## 2021-12-20 ENCOUNTER — MYC MEDICAL ADVICE (OUTPATIENT)
Dept: FAMILY MEDICINE | Facility: CLINIC | Age: 31
End: 2021-12-20

## 2021-12-20 DIAGNOSIS — F90.9 ATTENTION DEFICIT HYPERACTIVITY DISORDER (ADHD), UNSPECIFIED ADHD TYPE: ICD-10-CM

## 2021-12-20 DIAGNOSIS — F33.2 SEVERE EPISODE OF RECURRENT MAJOR DEPRESSIVE DISORDER, WITHOUT PSYCHOTIC FEATURES (H): ICD-10-CM

## 2021-12-21 NOTE — TELEPHONE ENCOUNTER
Please see patients request to refill bupropion with a dosage increase. Will send patient a PHQ/ISABEL to complete as well.     Sj Ramirez CMA ([facility])

## 2021-12-22 RX ORDER — BUPROPION HYDROCHLORIDE 150 MG/1
150 TABLET ORAL EVERY MORNING
Qty: 30 TABLET | Refills: 1 | Status: CANCELLED | OUTPATIENT
Start: 2021-12-22

## 2022-02-10 ENCOUNTER — VIRTUAL VISIT (OUTPATIENT)
Dept: FAMILY MEDICINE | Facility: CLINIC | Age: 32
End: 2022-02-10
Payer: COMMERCIAL

## 2022-02-10 DIAGNOSIS — Z71.6 ENCOUNTER FOR TOBACCO USE CESSATION COUNSELING: ICD-10-CM

## 2022-02-10 DIAGNOSIS — F17.200 TOBACCO USE DISORDER: ICD-10-CM

## 2022-02-10 DIAGNOSIS — F33.2 SEVERE EPISODE OF RECURRENT MAJOR DEPRESSIVE DISORDER, WITHOUT PSYCHOTIC FEATURES (H): Primary | ICD-10-CM

## 2022-02-10 DIAGNOSIS — F41.9 ANXIETY: ICD-10-CM

## 2022-02-10 DIAGNOSIS — F90.9 ATTENTION DEFICIT HYPERACTIVITY DISORDER (ADHD), UNSPECIFIED ADHD TYPE: ICD-10-CM

## 2022-02-10 PROCEDURE — 99214 OFFICE O/P EST MOD 30 MIN: CPT | Mod: 95 | Performed by: FAMILY MEDICINE

## 2022-02-10 RX ORDER — VARENICLINE TARTRATE 1 MG/1
1 TABLET, FILM COATED ORAL 2 TIMES DAILY
Qty: 60 TABLET | Refills: 1 | Status: SHIPPED | OUTPATIENT
Start: 2022-03-10 | End: 2022-06-07

## 2022-02-10 RX ORDER — DEXTROAMPHETAMINE SACCHARATE, AMPHETAMINE ASPARTATE, DEXTROAMPHETAMINE SULFATE AND AMPHETAMINE SULFATE 2.5; 2.5; 2.5; 2.5 MG/1; MG/1; MG/1; MG/1
10 TABLET ORAL DAILY
Qty: 10 TABLET | Refills: 0 | Status: SHIPPED | OUTPATIENT
Start: 2022-02-10 | End: 2022-02-23

## 2022-02-10 RX ORDER — BUPROPION HYDROCHLORIDE 300 MG/1
300 TABLET ORAL EVERY MORNING
Qty: 30 TABLET | Refills: 1 | Status: SHIPPED | OUTPATIENT
Start: 2022-02-10 | End: 2022-04-27

## 2022-02-10 RX ORDER — HYDROXYZINE HYDROCHLORIDE 10 MG/1
10 TABLET, FILM COATED ORAL EVERY 8 HOURS PRN
Qty: 30 TABLET | Refills: 0 | Status: SHIPPED | OUTPATIENT
Start: 2022-02-10

## 2022-02-10 ASSESSMENT — ANXIETY QUESTIONNAIRES
GAD7 TOTAL SCORE: 11
4. TROUBLE RELAXING: SEVERAL DAYS
GAD7 TOTAL SCORE: 11
5. BEING SO RESTLESS THAT IT IS HARD TO SIT STILL: SEVERAL DAYS
1. FEELING NERVOUS, ANXIOUS, OR ON EDGE: MORE THAN HALF THE DAYS
7. FEELING AFRAID AS IF SOMETHING AWFUL MIGHT HAPPEN: MORE THAN HALF THE DAYS
6. BECOMING EASILY ANNOYED OR IRRITABLE: MORE THAN HALF THE DAYS
GAD7 TOTAL SCORE: 11
3. WORRYING TOO MUCH ABOUT DIFFERENT THINGS: MORE THAN HALF THE DAYS
7. FEELING AFRAID AS IF SOMETHING AWFUL MIGHT HAPPEN: MORE THAN HALF THE DAYS
2. NOT BEING ABLE TO STOP OR CONTROL WORRYING: SEVERAL DAYS

## 2022-02-10 ASSESSMENT — PATIENT HEALTH QUESTIONNAIRE - PHQ9
10. IF YOU CHECKED OFF ANY PROBLEMS, HOW DIFFICULT HAVE THESE PROBLEMS MADE IT FOR YOU TO DO YOUR WORK, TAKE CARE OF THINGS AT HOME, OR GET ALONG WITH OTHER PEOPLE: VERY DIFFICULT
SUM OF ALL RESPONSES TO PHQ QUESTIONS 1-9: 10
SUM OF ALL RESPONSES TO PHQ QUESTIONS 1-9: 10

## 2022-02-10 ASSESSMENT — ENCOUNTER SYMPTOMS: NERVOUS/ANXIOUS: 1

## 2022-02-10 NOTE — PATIENT INSTRUCTIONS
Patient Education     Chantix Starter Pack  Uses  For quitting smoking.  Instructions  Take the medicine with 250 mL (1 cup) of water.  Take the medicine after eating a meal.  Keep the medicine at room temperature. Avoid heat and direct light.  Choose a date to quit smoking. Start this medicine 1 week before your chosen day to quit smoking.  It is important that you keep taking each dose of this medicine on time even if you are feeling well.  If you forget to take a dose on time, take it as soon as you remember. If it is almost time for the next dose, do not take the missed dose. Return to your normal dosing schedule. Do not take 2 doses of this medicine at one time.  Please tell your doctor and pharmacist about all the medicines you take. Include both prescription and over-the-counter medicines. Also tell them about any vitamins, herbal medicines, or anything else you take for your health.  Cautions  This medicine is not recommended for use in children younger than 16.  Tell your doctor and pharmacist if you ever had an allergic reaction to a medicine. Symptoms of an allergic reaction can include trouble breathing, skin rash, itching, swelling, or severe dizziness.  Speak with your doctor about how you feel after you quit smoking. Some people on this medicine may feel depressed, have trouble sleeping, become irritable, or gain weight. Discuss these symptoms with your doctor. Your doctor may wish to adjust your medication dosage.  Do not use the medication any more than instructed.  Your ability to stay alert or to react quickly may be impaired by this medicine. Do not drive or operate machinery until you know how this medicine will affect you.  Call the doctor if there are any signs of confusion or unusual changes in behavior.  Tell the doctor or pharmacist if you are pregnant, planning to be pregnant, or breastfeeding.  Ask your pharmacist if this medicine can interact with any of your other medicines. Be sure  to tell them about all the medicines you take.  Do not start or stop any other medicines without first speaking to your doctor or pharmacist.  Do not share this medicine with anyone who has not been prescribed this medicine.  This medicine can cause serious side effects in some patients. Important information from the U.S. Food and Drug Administration (FDA) is available from your pharmacist. Please review it carefully with your pharmacist to understand the risks associated with this medicine.  Side Effects  The following is a list of some common side effects from this medicine. Please speak with your doctor about what you should do if you experience these or other side effects.    agitated feeling or trouble sleeping    constipation    drowsiness or sedation    excess gas    headaches    nausea    vivid dreams or nightmares    vomiting  Call your doctor or get medical help right away if you notice any of these more serious side effects:    change in behavior    chest pain    changes in memory, mood, or thinking    depression or feeling sad    hallucinations (unusual thoughts, seeing or hearing things that are not real)    mood changes    shortness of breath    symptoms of stroke (such as one-sided weakness, slurred speech, confusion)  A few people may have an allergic reactions to this medicine. Symptoms can include difficulty breathing, skin rash, itching, swelling, or severe dizziness. If you notice any of these symptoms, seek medical help quickly.  Extra  Please speak with your doctor, nurse, or pharmacist if you have any questions about this medicine.  [email or web address]  IMPORTANT NOTE: This document tells you briefly how to take your medicine, but it does not tell you all there is to know about it.Your doctor or pharmacist may give you other documents about your medicine. Please talk to them if you have any questions.Always follow their advice. There is a more complete description of  this medicine available in English.Scan this code on your smartphone or tablet or use the web address below. You can also ask your pharmacist for a printout. If you have any questions, please ask your pharmacist.     2021 [employer].

## 2022-02-10 NOTE — PROGRESS NOTES
Levi is a 31 year old who is being evaluated via a billable video visit.      How would you like to obtain your AVS? MyChart  If the video visit is dropped, the invitation should be resent by: Text to cell phone: [phone]  Will anyone else be joining your video visit? No    Video Start Time: 5:40 pm    Assessment & Plan     Severe episode of recurrent major depressive disorder, without psychotic features (H)  Increase Bupropion to 300 mg.  Follow up in 6 weeks or sooner as needed.  - buPROPion (WELLBUTRIN XL) 300 MG 24 hr tablet; Take 1 tablet (300 mg) by mouth every morning    Anxiety  We will try a smaller dose hydroxyzine to see if this helps without making him too tired.  We also discussed the need to monitoring of symptoms with the increase bupropion dose in addition to starting Adderall, as both could be problematic for anxiety.  - hydrOXYzine (ATARAX) 10 MG tablet; Take 1 tablet (10 mg) by mouth every 8 hours as needed for anxiety    Attention deficit hyperactivity disorder (ADHD), unspecified ADHD type  We will start on Adderall, 10 mg daily in AM.  Patient will keep me updated in the next few days as to his symptoms relief with this dose.  Can consider increasing dose or adding second afternoon dose, consider extended release, all pending how he does with the start.  Recommendations pending.  - buPROPion (WELLBUTRIN XL) 300 MG 24 hr tablet; Take 1 tablet (300 mg) by mouth every morning  - amphetamine-dextroamphetamine (ADDERALL) 10 MG tablet; Take 1 tablet (10 mg) by mouth daily    Tobacco use disorder/ Encounter for Tobacco use cessation counseling   Will go ahead and start patient on Chantix and provide 3 months supply.  Discussed the importance of monitoring his mental health concerns while he starts this medication.  Reviewed use and provided information in the AVS.  - varenicline (CHANTIX MARIO) 0.5 MG X 11 & 1 MG X 42 tablet; Take 0.5 mg tab daily for 3 days, THEN 0.5 mg tab twice daily for 4 days,  "THEN 1 mg twice daily.  - varenicline (CHANTIX) 1 MG tablet; Take 1 tablet (1 mg) by mouth 2 times daily    28 minutes spent on the date of the encounter doing chart review, history and exam, documentation and further activities per the note       BMI:   Estimated body mass index is 28.68 kg/m  as calculated from the following:    Height as of 11/23/21: 1.803 m (5' 11\").    Weight as of 11/23/21: 93.3 kg (205 lb 9.6 oz).       Depression Screening Follow Up    PHQ 2/10/2022   PHQ-9 Total Score 10   Q9: Thoughts of better off dead/self-harm past 2 weeks Not at all   F/U: Thoughts of suicide or self-harm -   F/U: Self harm-plan -   F/U: Self-harm action -   F/U: Safety concerns -       Follow Up Actions Taken      See Patient Instructions    Return in about 6 weeks (around 3/24/2022) for Recheck Depression/Anxiety.    Waleska Santos MD  [facility] PAKO Sims is a 31 year old who presents for the following health issues     Anxiety    History of Present Illness       Mental Health Follow-up:  Patient presents to follow-up on Depression & Anxiety.Patient's depression since last visit has been:  Medium  The patient is having other symptoms associated with depression.  Patient's anxiety since last visit has been:  Medium  The patient is having other symptoms associated with anxiety.  Any significant life events: relationship concerns and health concerns  Patient is feeling anxious or having panic attacks.  Patient has no concerns about alcohol or drug use.     Social History  Tobacco Use    Smoking status: Current Some Day Smoker      Packs/day: 0.00      Years: 5.00      Pack years: 0      Types: Cigars    Smokeless tobacco: Former User  Alcohol use: Yes    Comment: occasionally  Drug use: Yes    Types: Marijuana      Today's PHQ-9         PHQ-9 Total Score:     (P) 10   PHQ-9 Q9 Thoughts of better off dead/self-harm past 2 weeks :   (P) Not at all   Thoughts of suicide or self " harm:      Self-harm Plan:        Self-harm Action:          Safety concerns for self or others:            ISABEL-7 SCORE 11/23/2021 12/21/2021 2/10/2022   Total Score - 3 (minimal anxiety) 11 (moderate anxiety)   Total Score 20 3 11       PHQ 11/23/2021 12/21/2021 2/10/2022   PHQ-9 Total Score 25 6 10   Q9: Thoughts of better off dead/self-harm past 2 weeks Several days Not at all Not at all   F/U: Thoughts of suicide or self-harm - - -   F/U: Self harm-plan - - -   F/U: Self-harm action - - -   F/U: Safety concerns - - -         Currently on separation with his wife, primarily because of his anger issues.  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  Says that the Bupropion seems to help but not quite enough.  He continues to be symptomatic with depression and anxiety.  He tried the hydroxyzine, which helps some but knocks him out.     In addition to his depressive symptoms, he feels that he needs to do more for his ADHD.  He initially tried to avoid stimulants due to his history, see previous notes for explanation.  We put him on Strattera, maxed dosing out, and had no significant improvement.  He did not do well with Bupropion alone.  He would like to try to get back on Adderall or Ritalin, as he notes that when he stopped using them it seems like that may be around the time that his anger worsened and became a problem.  Does not recall the Ritalin use, as it was when he was very young.  He switched to Adderall in Middle school and used it into high school.    Patient also would like to get serious with tobacco cessation, notes the lozenges did not help.  Recalls that Chantix helped but he is a little concerned with the previous recall of the medication.    Current Outpatient Medications   Medication Sig Dispense Refill     amphetamine-dextroamphetamine (ADDERALL) 10 MG tablet Take 1 tablet (10 mg) by mouth daily 10 tablet 0     buPROPion (WELLBUTRIN XL) 300 MG 24 hr tablet  Take 1 tablet (300 mg) by mouth every morning 30 tablet 1     hydrOXYzine (ATARAX) 10 MG tablet Take 1 tablet (10 mg) by mouth every 8 hours as needed for anxiety 30 tablet 0     varenicline (CHANTIX MARIO) 0.5 MG X 11 & 1 MG X 42 tablet Take 0.5 mg tab daily for 3 days, THEN 0.5 mg tab twice daily for 4 days, THEN 1 mg twice daily. 53 tablet 0     [START ON 3/10/2022] varenicline (CHANTIX) 1 MG tablet Take 1 tablet (1 mg) by mouth 2 times daily 60 tablet 1     atropine 1 % ophthalmic solution Apply 1 drop to eye       brimonidine (ALPHAGAN P) 0.1 % ophthalmic solution Do not use unless instructed by eye doctor 1 Bottle 0     dorzolamide (TRUSOPT) 2 % ophthalmic solution Do not use unless instructed by your eye doctor 1 Bottle 0     latanoprost (XALATAN) 0.005 % ophthalmic solution Place 1 drop Into the left eye 2 times daily 1 Bottle 0     neomycin-polymyxin-dexamethasone (MAXITROL) 3.5-29485-1.1 ophthalmic ointment APPLY 1 APPLICATION IN LEFT EYE THREE TIMES A DAY       prednisoLONE acetate (PRED FORTE) 1 % ophthalmic suspension Apply 1 drop to eye       timolol maleate (TIMOPTIC) 0.5 % ophthalmic solution Do not use unless instructed by your eye doctor 1 Bottle 0             Review of Systems   Psychiatric/Behavioral: The patient is nervous/anxious.       Constitutional, HEENT, cardiovascular, pulmonary, gi and gu systems are negative, except as otherwise noted.      Objective           Vitals:  No vitals were obtained today due to virtual visit.    Physical Exam   GENERAL: Healthy, alert and no distress  EYES: Eyes grossly normal to inspection.  No discharge or erythema, or obvious scleral/conjunctival abnormalities.  RESP: No audible wheeze, cough, or visible cyanosis.  No visible retractions or increased work of breathing.    SKIN: Visible skin clear. No significant rash, abnormal pigmentation or lesions.  NEURO: Cranial nerves grossly intact.  Mentation and speech appropriate for age.  PSYCH: Mentation appears  normal, affect normal/bright, judgement and insight intact, normal speech and appearance well-groomed.            Video-Visit Details    Type of service:  Video Visit    Video End Time:5:59 pm    Originating Location (pt. Location): Home    Distant Location (provider location):  [facility] [place]     Platform used for Video Visit: Optose[employer]ity  Answers for HPI/ROS submitted by the patient on 2/10/2022  If you checked off any problems, how difficult have these problems made it for you to do your work, take care of things at home, or get along with other people?: Very difficult  PHQ9 TOTAL SCORE: 10  ISABEL 7 TOTAL SCORE: 11

## 2022-02-11 ASSESSMENT — ANXIETY QUESTIONNAIRES: GAD7 TOTAL SCORE: 11

## 2022-02-14 NOTE — PROGRESS NOTES
Msg left for Pt to callback to schedule 6 week follow up visit per provider.    Marlys Chadwick/EMT-B  [facility] / [place]

## 2022-02-22 ENCOUNTER — MYC MEDICAL ADVICE (OUTPATIENT)
Dept: FAMILY MEDICINE | Facility: CLINIC | Age: 32
End: 2022-02-22
Payer: COMMERCIAL

## 2022-02-22 DIAGNOSIS — F90.9 ATTENTION DEFICIT HYPERACTIVITY DISORDER (ADHD), UNSPECIFIED ADHD TYPE: ICD-10-CM

## 2022-02-23 DIAGNOSIS — F90.9 ATTENTION DEFICIT HYPERACTIVITY DISORDER (ADHD), UNSPECIFIED ADHD TYPE: ICD-10-CM

## 2022-02-23 RX ORDER — DEXTROAMPHETAMINE SACCHARATE, AMPHETAMINE ASPARTATE, DEXTROAMPHETAMINE SULFATE AND AMPHETAMINE SULFATE 5; 5; 5; 5 MG/1; MG/1; MG/1; MG/1
20 TABLET ORAL DAILY
Qty: 10 TABLET | Refills: 0 | Status: SHIPPED | OUTPATIENT
Start: 2022-02-23 | End: 2022-02-23

## 2022-02-23 RX ORDER — DEXTROAMPHETAMINE SACCHARATE, AMPHETAMINE ASPARTATE, DEXTROAMPHETAMINE SULFATE AND AMPHETAMINE SULFATE 5; 5; 5; 5 MG/1; MG/1; MG/1; MG/1
20 TABLET ORAL DAILY
Qty: 10 TABLET | Refills: 0 | Status: SHIPPED | OUTPATIENT
Start: 2022-02-23 | End: 2022-03-07

## 2022-02-23 NOTE — TELEPHONE ENCOUNTER
See Quat-E Message, please advise on plan.     Alice Ca, RN, BSN, PHN  [facility]

## 2022-02-23 NOTE — TELEPHONE ENCOUNTER
Patient is requesting the following Rx to be transferred to our [facility] in [place], he no longer goes to [facility] in [place].  amphetamine-dextroamphetamine (ADDERALL) 20 MG tablet 10 tablet 0 2/23/2022  No   Sig - Route: Take 1 tablet (20 mg) by mouth daily - Oral   Sent to pharmacy as: Amphetamine-Dextroamphetamine 20 MG Oral Tablet (ADDERALL)   Class: E-Prescribe     Nuha Carr   [facility]  Central Scheduler

## 2022-02-23 NOTE — TELEPHONE ENCOUNTER
Routing refill request to provider for review/approval because:  Drug not on the [facility] refill protocol     Jeane Mcdowell RN   [facility]  -- Triage Nurse

## 2022-03-07 DIAGNOSIS — F90.9 ATTENTION DEFICIT HYPERACTIVITY DISORDER (ADHD), UNSPECIFIED ADHD TYPE: ICD-10-CM

## 2022-03-07 RX ORDER — DEXTROAMPHETAMINE SACCHARATE, AMPHETAMINE ASPARTATE, DEXTROAMPHETAMINE SULFATE AND AMPHETAMINE SULFATE 5; 5; 5; 5 MG/1; MG/1; MG/1; MG/1
20 TABLET ORAL DAILY
Qty: 30 TABLET | Refills: 0 | Status: SHIPPED | OUTPATIENT
Start: 2022-03-07 | End: 2022-03-29

## 2022-03-07 NOTE — TELEPHONE ENCOUNTER
Patient calling for refill of Adderall.  States has been doing well the last 10 days on 20 mg dose.  Na Au RN

## 2022-03-15 DIAGNOSIS — F17.200 TOBACCO USE DISORDER: ICD-10-CM

## 2022-03-16 RX ORDER — VARENICLINE TARTRATE 1 MG/1
TABLET, FILM COATED ORAL
Qty: 53 TABLET | Refills: 0 | OUTPATIENT
Start: 2022-03-16

## 2022-03-16 NOTE — TELEPHONE ENCOUNTER
Refill denied. Has refills on file. Spoke with pharmacy staff to confirm.  Christianne Ellington RN

## 2022-03-29 DIAGNOSIS — F90.9 ATTENTION DEFICIT HYPERACTIVITY DISORDER (ADHD), UNSPECIFIED ADHD TYPE: ICD-10-CM

## 2022-03-29 RX ORDER — DEXTROAMPHETAMINE SACCHARATE, AMPHETAMINE ASPARTATE, DEXTROAMPHETAMINE SULFATE AND AMPHETAMINE SULFATE 5; 5; 5; 5 MG/1; MG/1; MG/1; MG/1
20 TABLET ORAL DAILY
Qty: 30 TABLET | Refills: 0 | OUTPATIENT
Start: 2022-03-29

## 2022-03-29 RX ORDER — DEXTROAMPHETAMINE SACCHARATE, AMPHETAMINE ASPARTATE, DEXTROAMPHETAMINE SULFATE AND AMPHETAMINE SULFATE 5; 5; 5; 5 MG/1; MG/1; MG/1; MG/1
20 TABLET ORAL DAILY
Qty: 30 TABLET | Refills: 0 | Status: SHIPPED | OUTPATIENT
Start: 2022-03-29 | End: 2022-05-24

## 2022-03-29 NOTE — TELEPHONE ENCOUNTER
I will send in a new prescription today, but it will be up to insurance as to whether or not they will let him fill it.

## 2022-03-29 NOTE — TELEPHONE ENCOUNTER
"Pt calling back states, \"I was only given 20 tablets because that is all my insurance would approve.\" Writer called pharmacy, pharmacy states that they only had 20 in stock at the time, pt chose to take medication with only 20 tablets and additional 10 tablets remaining were \"forfited.\" Will route to provider.    Christelle SAAB RN    "

## 2022-03-29 NOTE — TELEPHONE ENCOUNTER
Msg left for Pt to callback. Confirm with Pt the amount he is taking each day.    From Provider:  'Rx was filled 3 weeks ago.  Cannot refill early, will need to place request on April 4th. was filled 3 weeks ago. Will need to place request on April 4th.'      Marlys Chadwick/EMT-B  [facility] / [place]

## 2022-03-29 NOTE — TELEPHONE ENCOUNTER
Routing refill request to provider for review/approval because:  Drug not on the [facility] refill protocol, Amphetamine - Dextroamphetamine 20 MG tab    Pt states he took his last pill and is out. Medication was filled on 3/7/2022.    Alice GATICA RN

## 2022-03-30 ENCOUNTER — TELEPHONE (OUTPATIENT)
Dept: FAMILY MEDICINE | Facility: CLINIC | Age: 32
End: 2022-03-30
Payer: COMMERCIAL

## 2022-03-30 NOTE — TELEPHONE ENCOUNTER
[employer] calling and states patient needs PA for Adderall.  Discussed appears RX sent yesterday and we do not have PA information yet.  She will fax PA information to [facility] fax.  Na Au RN

## 2022-03-30 NOTE — TELEPHONE ENCOUNTER
Contacted/advised Pt. He stated he will check with his insurance company.    Marlys Chadwick/EMT-B  [facility] / [place]

## 2022-04-04 ENCOUNTER — TELEPHONE (OUTPATIENT)
Dept: FAMILY MEDICINE | Facility: CLINIC | Age: 32
End: 2022-04-04
Payer: COMMERCIAL

## 2022-04-04 NOTE — TELEPHONE ENCOUNTER
Pharmacy Benefit Information:    Provider: [facility]  Phone #: [phone]  ID#: [id number]  Name: Manuela   BIN: [id number]  GRP: [id number]  Medication/SIG: amphetamine salts 20 mg  Pharmacy: [facility]    Routed to [facility], please advise RX CHANGE OR PA, route to [facility] [place] pool or inform pharmacy of plan    **no alternates listed, needs PA  Elisabeth Rich RN, BSN  [facility]

## 2022-04-05 NOTE — TELEPHONE ENCOUNTER
Central Prior Authorization Team   Phone: [phone]    PA Initiation    Medication: amphet 20 mg  Insurance Company:    Pharmacy Filling the Rx: [id number] - [place], [place] - [address]  Filling Pharmacy Phone: [phone]  Filling Pharmacy Fax:    Start Date: 4/5/2022

## 2022-04-20 NOTE — TELEPHONE ENCOUNTER
Prior Authorization Approval    Authorization Effective Date: 3/21/2022  Authorization Expiration Date: 4/20/2023  Medication: amphet 20 mg  Approved Dose/Quantity:    Reference #: [id number]   Insurance Company:    Expected CoPay:       CoPay Card Available:      Foundation Assistance Needed:    Which Pharmacy is filling the prescription (Not needed for infusion/clinic administered): [id number] - [place], [place] - [address]  Pharmacy Notified: Yes  Patient Notified: Yes  **Instructed pharmacy to notify patient when script is ready to /ship.**

## 2022-04-25 DIAGNOSIS — F33.2 SEVERE EPISODE OF RECURRENT MAJOR DEPRESSIVE DISORDER, WITHOUT PSYCHOTIC FEATURES (H): ICD-10-CM

## 2022-04-25 DIAGNOSIS — F90.9 ATTENTION DEFICIT HYPERACTIVITY DISORDER (ADHD), UNSPECIFIED ADHD TYPE: ICD-10-CM

## 2022-04-26 NOTE — TELEPHONE ENCOUNTER
Routing refill request to provider for review/approval because:  PHQ-9  PHQ 11/23/2021 12/21/2021 2/10/2022   PHQ-9 Total Score 25 6 10   Q9: Thoughts of better off dead/self-harm past 2 weeks Several days Not at all Not at all   F/U: Thoughts of suicide or self-harm - - -   F/U: Self harm-plan - - -   F/U: Self-harm action - - -   F/U: Safety concerns - - -      Alice Ca, RN, BSN, PHN  [facility]

## 2022-04-27 RX ORDER — BUPROPION HYDROCHLORIDE 300 MG/1
300 TABLET ORAL EVERY MORNING
Qty: 30 TABLET | Refills: 1 | Status: SHIPPED | OUTPATIENT
Start: 2022-04-27 | End: 2022-06-07

## 2022-05-09 ENCOUNTER — PATIENT OUTREACH (OUTPATIENT)
Dept: FAMILY MEDICINE | Facility: CLINIC | Age: 32
End: 2022-05-09
Payer: COMMERCIAL

## 2022-05-09 NOTE — TELEPHONE ENCOUNTER
No future appointments.  Appointment Notes for this encounter:   Data Unavailable    Health Maintenance Due   Topic Date Due     ADVANCE CARE PLANNING  Never done     Pneumococcal Vaccine: Pediatrics (0 to 5 Years) and At-Risk Patients (6 to 64 Years) (1 - PCV) Never done     HIV SCREENING  Never done     HEPATITIS C SCREENING  Never done     PREVENTIVE CARE VISIT  01/20/2015     COVID-19 Vaccine (3 - Booster for Moderna series) 11/03/2021     Health Maintenance addressed:  PHQ9 and GAD7    PHQ9 / ISABEL / ACT Questionniares sent to Pt's [employer] account    MyChart Status:  Active and Using

## 2022-05-21 ASSESSMENT — PATIENT HEALTH QUESTIONNAIRE - PHQ9
SUM OF ALL RESPONSES TO PHQ QUESTIONS 1-9: 4
SUM OF ALL RESPONSES TO PHQ QUESTIONS 1-9: 4
10. IF YOU CHECKED OFF ANY PROBLEMS, HOW DIFFICULT HAVE THESE PROBLEMS MADE IT FOR YOU TO DO YOUR WORK, TAKE CARE OF THINGS AT HOME, OR GET ALONG WITH OTHER PEOPLE: SOMEWHAT DIFFICULT

## 2022-05-24 ENCOUNTER — ANCILLARY PROCEDURE (OUTPATIENT)
Dept: GENERAL RADIOLOGY | Facility: CLINIC | Age: 32
End: 2022-05-24
Attending: NURSE PRACTITIONER
Payer: COMMERCIAL

## 2022-05-24 ENCOUNTER — OFFICE VISIT (OUTPATIENT)
Dept: FAMILY MEDICINE | Facility: CLINIC | Age: 32
End: 2022-05-24

## 2022-05-24 VITALS
SYSTOLIC BLOOD PRESSURE: 131 MMHG | HEART RATE: 97 BPM | DIASTOLIC BLOOD PRESSURE: 81 MMHG | BODY MASS INDEX: 28.31 KG/M2 | WEIGHT: 203 LBS | OXYGEN SATURATION: 99 % | TEMPERATURE: 97.9 F | RESPIRATION RATE: 16 BRPM

## 2022-05-24 DIAGNOSIS — M25.511 CHRONIC RIGHT SHOULDER PAIN: ICD-10-CM

## 2022-05-24 DIAGNOSIS — G56.03 BILATERAL CARPAL TUNNEL SYNDROME: ICD-10-CM

## 2022-05-24 DIAGNOSIS — G89.29 CHRONIC RIGHT SHOULDER PAIN: Primary | ICD-10-CM

## 2022-05-24 DIAGNOSIS — G89.29 CHRONIC PAIN OF LEFT KNEE: ICD-10-CM

## 2022-05-24 DIAGNOSIS — F90.9 ATTENTION DEFICIT HYPERACTIVITY DISORDER (ADHD), UNSPECIFIED ADHD TYPE: ICD-10-CM

## 2022-05-24 DIAGNOSIS — M25.511 CHRONIC RIGHT SHOULDER PAIN: Primary | ICD-10-CM

## 2022-05-24 DIAGNOSIS — G89.29 CHRONIC RIGHT SHOULDER PAIN: ICD-10-CM

## 2022-05-24 DIAGNOSIS — M25.562 CHRONIC PAIN OF LEFT KNEE: ICD-10-CM

## 2022-05-24 PROCEDURE — 73030 X-RAY EXAM OF SHOULDER: CPT | Mod: TC | Performed by: RADIOLOGY

## 2022-05-24 PROCEDURE — 99214 OFFICE O/P EST MOD 30 MIN: CPT | Performed by: NURSE PRACTITIONER

## 2022-05-24 RX ORDER — DEXTROAMPHETAMINE SACCHARATE, AMPHETAMINE ASPARTATE, DEXTROAMPHETAMINE SULFATE AND AMPHETAMINE SULFATE 5; 5; 5; 5 MG/1; MG/1; MG/1; MG/1
20 TABLET ORAL DAILY
Qty: 30 TABLET | Refills: 0 | Status: SHIPPED | OUTPATIENT
Start: 2022-05-24 | End: 2022-06-07 | Stop reason: DRUGHIGH

## 2022-05-24 RX ORDER — PREDNISONE 20 MG/1
40 TABLET ORAL DAILY
Qty: 10 TABLET | Refills: 0 | Status: SHIPPED | OUTPATIENT
Start: 2022-05-24 | End: 2022-05-29

## 2022-05-24 NOTE — PROGRESS NOTES
"  Assessment & Plan     Chronic right shoulder pain  Progressive pain with notable restrictions of ROM from pain. Suspect significant tear to supraspinous, concerns for subscapularis tendon. Likely multi tendon injury. MRI recommended, then to follow-up with Orthopedics.   No acute fracture or dislocation on X-ray. Radiology read pending.    Prednisone for pain control at this time.   - MR Shoulder Right w/o Contrast  - XR Shoulder Right 2 Views  - Orthopedic  Referral  - Orthopedic  Referral  - predniSONE (DELTASONE) 20 MG tablet  Dispense: 10 tablet; Refill: 0    Chronic pain of left knee  No acute fracture or dislocation on X-ray. Radiology read pending.    Suspicion for meniscal injury.   Trial of prednisone to multipurpose the many inflammatory concerns.   Consult Physical Therapy.   To follow-up with Orthopedics if not improving.   - XR Knee Left 1/2 Views  - Orthopedic  Referral  - Orthopedic  Referral  - Physical Therapy Referral  - predniSONE (DELTASONE) 20 MG tablet  Dispense: 10 tablet; Refill: 0    Bilateral carpal tunnel syndrome  R>L carpal tunnel syndrome. Right shoulder pathology likely contributing to R>L symptoms.   Trial of prednisone. Discussed medication use, risks, benefits, and side effects.    - predniSONE (DELTASONE) 20 MG tablet  Dispense: 10 tablet; Refill: 0    Attention deficit hyperactivity disorder (ADHD), unspecified ADHD type  Multiple concerns today and would like to further discuss possible dose changes with PCP in future.   Refilled x 1 month and will follow-up with PCP.    record reviewed and is without red flags for controlled substance activity.    - amphetamine-dextroamphetamine (ADDERALL) 20 MG tablet  Dispense: 30 tablet; Refill: 0               BMI:   Estimated body mass index is 28.31 kg/m  as calculated from the following:    Height as of 11/23/21: 1.803 m (5' 11\").    Weight as of this encounter: 92.1 kg (203 lb).           Return " in about 2 weeks (around 2022) for Follow-up.    ARMANI Lr CNP [facility] SAVANNAH Sims is a 31 year old who presents for the following health issues     HPI     Depression and Anxiety Follow-Up    How are you doing with your depression since your last visit? Improved     How are you doing with your anxiety since your last visit?  Improved     Are you having other symptoms that might be associated with depression or anxiety? Yes:  more irritabilty     Have you had a significant life event? OTHER: changed jobs, quit smoking      Do you have any concerns with your use of alcohol or other drugs? No    Social History     Tobacco Use     Smoking status: Former Smoker     Packs/day: 1.00     Years: 5.00     Pack years: 5.00     Types: Cigars     Start date: 1998     Quit date: 2022     Years since quittin.3     Smokeless tobacco: Former User   Vaping Use     Vaping Use: Never used   Substance Use Topics     Alcohol use: Yes     Comment: occasionally     Drug use: Yes     Types: Marijuana     PHQ 2/10/2022 2022 2022   PHQ-9 Total Score 10 4 4   Q9: Thoughts of better off dead/self-harm past 2 weeks Not at all Not at all Not at all   F/U: Thoughts of suicide or self-harm - - -   F/U: Self harm-plan - - -   F/U: Self-harm action - - -   F/U: Safety concerns - - -     ISABEL-7 SCORE 2021 2/10/2022 2022   Total Score 3 (minimal anxiety) 11 (moderate anxiety) 7 (mild anxiety)   Total Score 3 11 7     Last PHQ-9 2022   1.  Little interest or pleasure in doing things 0   2.  Feeling down, depressed, or hopeless 1   3.  Trouble falling or staying asleep, or sleeping too much 1   4.  Feeling tired or having little energy 1   5.  Poor appetite or overeating 0   6.  Feeling bad about yourself 0   7.  Trouble concentrating 1   8.  Moving slowly or restless 0   Q9: Thoughts of better off dead/self-harm past 2 weeks 0   PHQ-9 Total Score 4   Difficulty  at work, home, or with people -   In the past two weeks have you had thoughts of suicide or self harm? -   Do you have concerns about your personal safety or the safety of others? -   In the past 2 weeks have you thought about a plan or had intention to harm yourself? -   In the past 2 weeks have you acted on these thoughts in any way? -     ISABEL-7  5/21/2022   1. Feeling nervous, anxious, or on edge 1   2. Not being able to stop or control worrying 0   3. Worrying too much about different things 3   4. Trouble relaxing 1   5. Being so restless that it is hard to sit still 1   6. Becoming easily annoyed or irritable 1   7. Feeling afraid, as if something awful might happen 0   ISABEL-7 Total Score 7   If you checked any problems, how difficult have they made it for you to do your work, take care of things at home, or get along with other people? -       Suicide Assessment Five-step Evaluation and Treatment (SAFE-T)  ADHD Follow-Up (Adult)  Concerns: trouble concentrating   Changes since last visit: Improving  Taking controlled (daily) medications as prescribed: Yes  Sleep: trouble staying asleep  Adult ADHD Self-Reporting form given to patient?:  No  Currently in counseling: Yes    Medication Benefits:   Controlled symptoms: Hyperactivity - motor restlessness, Attention span, Distractability, Finishing tasks, Impulse control and Frustration tolerance  Uncontrolled symptoms:  Pt states it is not as affective as it used to be     Medication Side Effects:  Reports:  none  Sleep Problems? no  ++++++++++++++++++++++++++++++++++++++++++++++++    Employer Concerns/Feedback: None  Coworker Concerns:   None  Home/Family Concerns: noticing some irritability .      Pain History:  When did you first notice your pain? - Acute Pain   Have you seen anyone else for your pain? No  Where in your body do you have pain? Musculoskeletal problem/pain  Onset/Duration: over one month   Description  Location: Right shoulder, left knee.   Joint  "Swelling: no  Redness: no  Pain: YES  Warmth: no  Intensity:  moderate, severe  Progression of Symptoms:  same  Accompanying signs and symptoms:   Fevers: no  Numbness/tingling/weakness: no, pt notes numbness in both hands   History  Trauma to the area: no  Recent illness:  no  Previous similar problem: YES  Previous evaluation:  no  Precipitating or alleviating factors:  Aggravating factors include: sitting, standing, walking, lifting and exercise  Therapies tried and outcome: Ibuprofen, sambra, knee brace with little relief     Right shoulder: x 1 month progressive, has been with issues x years. No known trauma. Has decreased ROM.  Pain to posterior shoulder, radiates down bicep.   Extreme pain with lifting arm.   Weakness throughout arm with   Has done Physical Therapy in past without benefits.   Waking daily with increased numbness, tingling, weakness.   Denies neck pain.   History of working as a .   Ibuprofen with no benefits.     Left knee: worsening over past week with starting \"years ago.\"  Pain to medial, lateral, and posterior knee.   Walking makes worse, can tighten after long time of sitting.   Rest overnight improved.   Used ibuprofen with mild benefits.     Review of Systems   Constitutional, HEENT, cardiovascular, pulmonary, gi and gu systems are negative, except as otherwise noted.      Objective    /81 (BP Location: Right arm, Patient Position: Chair, Cuff Size: Adult Regular)   Pulse 97   Temp 97.9  F (36.6  C) (Oral)   Resp 16   Wt 92.1 kg (203 lb)   SpO2 99%   BMI 28.31 kg/m    Body mass index is 28.31 kg/m .  Physical Exam   GENERAL: healthy, alert and no distress  MS: right shoulder with reproducible pain with active and passive ROM. + Baumann, Neers, and can test. Strength reduce to RUE.   Left knee with reproducible pain to medial and latera joint line. No effusion.                 "

## 2022-05-24 NOTE — PATIENT INSTRUCTIONS
Prednisone 2 tabs (40 mg) once daily x 5 days. No NSAID use while using prednisone (Advil, ibuprofen, naproxen, Aleve)    Call [phone] to schedule MRI    Follow-up with Orthopedics following MRI    Consult Physical Therapy for knee pain.

## 2022-06-01 ENCOUNTER — THERAPY VISIT (OUTPATIENT)
Dept: PHYSICAL THERAPY | Facility: CLINIC | Age: 32
End: 2022-06-01
Attending: NURSE PRACTITIONER
Payer: COMMERCIAL

## 2022-06-01 DIAGNOSIS — G89.29 CHRONIC PAIN OF LEFT KNEE: ICD-10-CM

## 2022-06-01 DIAGNOSIS — M25.562 CHRONIC PAIN OF LEFT KNEE: ICD-10-CM

## 2022-06-01 DIAGNOSIS — M25.562 ACUTE PAIN OF LEFT KNEE: ICD-10-CM

## 2022-06-01 PROCEDURE — 97161 PT EVAL LOW COMPLEX 20 MIN: CPT | Mod: GP | Performed by: PHYSICAL THERAPIST

## 2022-06-01 PROCEDURE — 97110 THERAPEUTIC EXERCISES: CPT | Mod: GP | Performed by: PHYSICAL THERAPIST

## 2022-06-01 ASSESSMENT — ACTIVITIES OF DAILY LIVING (ADL)
KNEE_ACTIVITY_OF_DAILY_LIVING_SUM: 32
AS_A_RESULT_OF_YOUR_KNEE_INJURY,_HOW_WOULD_YOU_RATE_YOUR_CURRENT_LEVEL_OF_DAILY_ACTIVITY?: NEARLY NORMAL
KNEE_ACTIVITY_OF_DAILY_LIVING_SCORE: 45.71
WEAKNESS: THE SYMPTOM AFFECTS MY ACTIVITY MODERATELY
GO DOWN STAIRS: ACTIVITY IS SOMEWHAT DIFFICULT
SQUAT: ACTIVITY IS VERY DIFFICULT
SWELLING: THE SYMPTOM AFFECTS MY ACTIVITY SLIGHTLY
GO UP STAIRS: ACTIVITY IS FAIRLY DIFFICULT
HOW_WOULD_YOU_RATE_THE_CURRENT_FUNCTION_OF_YOUR_KNEE_DURING_YOUR_USUAL_DAILY_ACTIVITIES_ON_A_SCALE_FROM_0_TO_100_WITH_100_BEING_YOUR_LEVEL_OF_KNEE_FUNCTION_PRIOR_TO_YOUR_INJURY_AND_0_BEING_THE_INABILITY_TO_PERFORM_ANY_OF_YOUR_USUAL_DAILY_ACTIVITIES?: 40
RISE FROM A CHAIR: ACTIVITY IS SOMEWHAT DIFFICULT
STAND: ACTIVITY IS SOMEWHAT DIFFICULT
STIFFNESS: THE SYMPTOM AFFECTS MY ACTIVITY MODERATELY
RAW_SCORE: 32
PAIN: THE SYMPTOM AFFECTS MY ACTIVITY MODERATELY
HOW_WOULD_YOU_RATE_THE_OVERALL_FUNCTION_OF_YOUR_KNEE_DURING_YOUR_USUAL_DAILY_ACTIVITIES?: ABNORMAL
KNEEL ON THE FRONT OF YOUR KNEE: ACTIVITY IS VERY DIFFICULT
GIVING WAY, BUCKLING OR SHIFTING OF KNEE: I HAVE THE SYMPTOM BUT IT DOES NOT AFFECT MY ACTIVITY
LIMPING: THE SYMPTOM AFFECTS MY ACTIVITY MODERATELY
SIT WITH YOUR KNEE BENT: ACTIVITY IS VERY DIFFICULT
WALK: ACTIVITY IS SOMEWHAT DIFFICULT

## 2022-06-01 NOTE — PROGRESS NOTES
Physical Therapy Initial Evaluation  Subjective:  The history is provided by the patient. No  was used.   Patient Health History  Scooby Ellington being seen for L knee pain .     Problem began: 5/24/2022.   Problem occurred: unknown    Pain is reported as 4/10 on pain scale.  General health as reported by patient is good.  Pertinent medical history includes: numbness/tingling, depression and chemical dependency.                Current occupation is  .   Primary job tasks include:  Prolonged standing, pushing/pulling, lifting/carrying and driving.                  Therapist Generated HPI Evaluation  Problem details: Pt reports L knee pain since 1.5 months ago; sudden onset of pain at work, he was walking with a trencher when he felt sudden onset of post lateral knee pain like a needle radiating to the back of the thigh and calf; saw a chiropractor which did not help; saw MD on 5/24/22 - x rays were done which were normal; MD recommended PT for potential meniscal injury     H/o R knee partial meniscal injury last year - improved after chiropractor adjustment   Also has R shoulder RC injury - awaiting MRI   .         Type of problem:  Left knee.    This is a new condition.  Condition occurred with:  A twist.  Where condition occurred: at work.  Patient reports pain:  Lateral and posterior.  Pain is described as sharp and aching   Pain radiates to:  Thigh and lower leg. Pain is worse during the day, worse in the A.M. and worse in the P.M..    Associated symptoms:  Loss of motion/stiffness and loss of strength. Symptoms are exacerbated by standing, kneeling, ascending stairs, descending stairs, walking, bending/squatting, weight bearing and sitting  and relieved by rest and ice (tapering dose of prednisone).  Special tests included:  X-ray.                            Objective:    Gait:    Gait Type:  Antalgic     Deviations:  Knee:  Knee extension decr L                                                  Hip Evaluation    Hip Strength:    Flexion:   Left: 4/5   Pain:  Right: 5/5   Pain:                                                 Knee Evaluation:  ROM:    AROM    Hyperextension:  Left:  0    Right: 0  Extension:  Left: 0    Right:  0  Flexion: Left: 95    Right: 135        Strength:     Extension:  Left: 4/5   Pain:      Right: 5/5   Pain:    Quad Set Left: Fair    Pain:   Quad Set Right: WNL    Pain:      Palpation:    Left knee tenderness present at:  Medial Joint Line; Lateral Joint Line; Patellar Tendon; Patellar Medial; Patellar Lateral and Patellar Inferior  Left knee tenderness not present at:  Patellar Superior      Mobility Testing:      Patellofemoral Medial:  Left: hypomobile      Patellofemoral Lateral:  Left: hypomobile      Patellofemoral Superior:  Left: hypomobile        Functional Testing:  : SLS L painful posteriorly         Quad:    Single Leg Squat:  Left:      Right:        Bilateral Leg Squat:  Partial only                General     ROS    Assessment/Plan:    Patient is a 31 year old male with left side knee complaints.    Patient has the following significant findings with corresponding treatment plan.                Diagnosis 1:  Acute L knee pain   Pain -  hot/cold therapy, manual therapy, self management, education, directional preference exercise and home program  Decreased ROM/flexibility - manual therapy, therapeutic exercise and home program  Decreased joint mobility - manual therapy, therapeutic exercise and home program  Decreased strength - therapeutic exercise, therapeutic activities and home program  Impaired balance - neuro re-education, therapeutic activities and home program  Impaired gait - gait training and home program  Impaired muscle performance - neuro re-education and home program  Decreased function - therapeutic activities and home program    Cumulative Therapy Evaluation is: Low complexity.    Previous and current functional limitations:  (See  Goal Flow Sheet for this information)    Short term and Long term goals: (See Goal Flow Sheet for this information)     Communication ability:  Patient appears to be able to clearly communicate and understand verbal and written communication and follow directions correctly.  Treatment Explanation - The following has been discussed with the patient:   RX ordered/plan of care  Anticipated outcomes  Possible risks and side effects  This patient would benefit from PT intervention to resume normal activities.   Rehab potential is good.    Frequency:  1 X week, once daily  Duration:  for 8 weeks  Discharge Plan:  Achieve all LTG.  Independent in home treatment program.  Return to previous functional level by discharge.  Reach maximal therapeutic benefit.    Please refer to the daily flowsheet for treatment today, total treatment time and time spent performing 1:1 timed codes.

## 2022-06-06 ASSESSMENT — PATIENT HEALTH QUESTIONNAIRE - PHQ9: SUM OF ALL RESPONSES TO PHQ QUESTIONS 1-9: 7

## 2022-06-07 ENCOUNTER — OFFICE VISIT (OUTPATIENT)
Dept: FAMILY MEDICINE | Facility: CLINIC | Age: 32
End: 2022-06-07
Payer: COMMERCIAL

## 2022-06-07 VITALS
HEART RATE: 111 BPM | HEIGHT: 71 IN | WEIGHT: 203.8 LBS | DIASTOLIC BLOOD PRESSURE: 83 MMHG | SYSTOLIC BLOOD PRESSURE: 118 MMHG | BODY MASS INDEX: 28.53 KG/M2 | TEMPERATURE: 98.5 F | OXYGEN SATURATION: 98 %

## 2022-06-07 DIAGNOSIS — F90.9 ATTENTION DEFICIT HYPERACTIVITY DISORDER (ADHD), UNSPECIFIED ADHD TYPE: Primary | ICD-10-CM

## 2022-06-07 DIAGNOSIS — F33.2 SEVERE EPISODE OF RECURRENT MAJOR DEPRESSIVE DISORDER, WITHOUT PSYCHOTIC FEATURES (H): ICD-10-CM

## 2022-06-07 PROBLEM — F32.A DEPRESSION, UNSPECIFIED DEPRESSION TYPE: Status: RESOLVED | Noted: 2021-04-26 | Resolved: 2022-06-07

## 2022-06-07 PROCEDURE — 99214 OFFICE O/P EST MOD 30 MIN: CPT | Performed by: FAMILY MEDICINE

## 2022-06-07 RX ORDER — BUPROPION HYDROCHLORIDE 300 MG/1
TABLET ORAL
Qty: 30 TABLET | Refills: 5 | Status: SHIPPED | OUTPATIENT
Start: 2022-06-07 | End: 2023-01-18

## 2022-06-07 RX ORDER — BUPROPION HYDROCHLORIDE 150 MG/1
TABLET ORAL
Qty: 30 TABLET | Refills: 5 | Status: SHIPPED | OUTPATIENT
Start: 2022-06-07 | End: 2023-01-18

## 2022-06-07 RX ORDER — DORZOLAMIDE HYDROCHLORIDE AND TIMOLOL MALEATE 20; 5 MG/ML; MG/ML
SOLUTION/ DROPS OPHTHALMIC
COMMUNITY
Start: 2022-03-08 | End: 2023-06-08

## 2022-06-07 RX ORDER — DEXTROAMPHETAMINE SACCHARATE, AMPHETAMINE ASPARTATE, DEXTROAMPHETAMINE SULFATE AND AMPHETAMINE SULFATE 7.5; 7.5; 7.5; 7.5 MG/1; MG/1; MG/1; MG/1
30 TABLET ORAL DAILY
Qty: 30 TABLET | Refills: 0 | Status: SHIPPED | OUTPATIENT
Start: 2022-06-07 | End: 2022-07-12

## 2022-06-07 ASSESSMENT — ANXIETY QUESTIONNAIRES
GAD7 TOTAL SCORE: 4
7. FEELING AFRAID AS IF SOMETHING AWFUL MIGHT HAPPEN: NOT AT ALL
8. IF YOU CHECKED OFF ANY PROBLEMS, HOW DIFFICULT HAVE THESE MADE IT FOR YOU TO DO YOUR WORK, TAKE CARE OF THINGS AT HOME, OR GET ALONG WITH OTHER PEOPLE?: SOMEWHAT DIFFICULT
GAD7 TOTAL SCORE: 4
3. WORRYING TOO MUCH ABOUT DIFFERENT THINGS: SEVERAL DAYS
1. FEELING NERVOUS, ANXIOUS, OR ON EDGE: SEVERAL DAYS
5. BEING SO RESTLESS THAT IT IS HARD TO SIT STILL: SEVERAL DAYS
4. TROUBLE RELAXING: NOT AT ALL
6. BECOMING EASILY ANNOYED OR IRRITABLE: SEVERAL DAYS
2. NOT BEING ABLE TO STOP OR CONTROL WORRYING: NOT AT ALL
GAD7 TOTAL SCORE: 4
7. FEELING AFRAID AS IF SOMETHING AWFUL MIGHT HAPPEN: NOT AT ALL

## 2022-06-07 ASSESSMENT — PATIENT HEALTH QUESTIONNAIRE - PHQ9
SUM OF ALL RESPONSES TO PHQ QUESTIONS 1-9: 6
SUM OF ALL RESPONSES TO PHQ QUESTIONS 1-9: 7
SUM OF ALL RESPONSES TO PHQ QUESTIONS 1-9: 6
10. IF YOU CHECKED OFF ANY PROBLEMS, HOW DIFFICULT HAVE THESE PROBLEMS MADE IT FOR YOU TO DO YOUR WORK, TAKE CARE OF THINGS AT HOME, OR GET ALONG WITH OTHER PEOPLE: SOMEWHAT DIFFICULT

## 2022-06-07 NOTE — PROGRESS NOTES
"  Assessment & Plan     Attention deficit hyperactivity disorder (ADHD), unspecified ADHD type  Increase Adderall dose to 30 mg to see if we get improvement in symptoms.  Will also be increasing Bupropion doses, so may get extra benefits.  Advised to monitor anxiety during these dose adjustments. If going well, follow up in 6 months or sooner as needed.  - amphetamine-dextroamphetamine (ADDERALL) 30 MG tablet; Take 1 tablet (30 mg) by mouth daily  - buPROPion (WELLBUTRIN XL) 300 MG 24 hr tablet; Take one tablet with a 150 mg tablet for a total of 450mg daily.    Severe episode of recurrent major depressive disorder, without psychotic features (H)  Increase Bupropion dose.  If improved, follow up in 6 months, 6 weeks if not improving.  - buPROPion (WELLBUTRIN XL) 150 MG 24 hr tablet; Take one tablet with one 300 mg tablet for a total of 450 mg daily  - buPROPion (WELLBUTRIN XL) 300 MG 24 hr tablet; Take one tablet with a 150 mg tablet for a total of 450mg daily.      35 minutes spent on the date of the encounter doing chart review, history and exam, documentation and further activities per the note       BMI:   Estimated body mass index is 28.42 kg/m  as calculated from the following:    Height as of this encounter: 1.803 m (5' 11\").    Weight as of this encounter: 92.4 kg (203 lb 12.8 oz).       See Patient Instructions    Return in about 6 months (around 12/7/2022).    Waleska Santos MD  [facility] SAVANNAH Sims is a 31 year old who presents for the following health issues     History of Present Illness       Mental Health Follow-up:  Patient presents to follow-up on Depression.Patient's depression since last visit has been:  Medium  The patient is having other symptoms associated with depression.      Any significant life events: health concerns  Patient is not feeling anxious or having panic attacks.  Patient has no concerns about alcohol or drug use.    Reason for visit:  " "Medication adjustment    He eats 0-1 servings of fruits and vegetables daily.He consumes 2 sweetened beverage(s) daily.He exercises with enough effort to increase his heart rate 20 to 29 minutes per day.  He exercises with enough effort to increase his heart rate 6 days per week.   He is taking medications regularly.    Today's PHQ-9         PHQ-9 Total Score: 7    PHQ-9 Q9 Thoughts of better off dead/self-harm past 2 weeks :   Not at all    How difficult have these problems made it for you to do your work, take care of things at home, or get along with other people: Somewhat difficult     Has been sober for almost 4 months, in a recovery program.  Stopped regular smoking, smokes cigars on occasion.  No recreational drug use in addition to his alcohol cessation.    He reports that Irritability is still a problem for him.  Has a lot of worrying related to a potential surgery, financial concerns if he is out of work, etc.  Working excessive hours to try to make up for possibly being out of work for a time period.  Takes his meds around 7 am, seems to wear off at 6-7 pm.  No trouble falling asleep because of the medication, but feels that his shoulder is the cause of his night time wakenings, wakes up primarily because of pain with tossing and turning.  Depression symptoms are more problematic, anxiety seems to be fine.    Adderall is helpful, able to focus, but does not seem to work as well as before.  Has been having increased distractibility.    Working on some shoulder issues, MRI pending.  Going to PT for his left knee pain.    Review of Systems   Constitutional, HEENT, cardiovascular, pulmonary, gi and gu systems are negative, except as otherwise noted.      Objective    /83 (BP Location: Right arm, Patient Position: Sitting, Cuff Size: Adult Large)   Pulse 111   Temp 98.5  F (36.9  C) (Oral)   Ht 1.803 m (5' 11\")   Wt 92.4 kg (203 lb 12.8 oz)   SpO2 98%   BMI 28.42 kg/m    Body mass index is 28.42 " kg/m .  Physical Exam   GENERAL: healthy, alert and no distress  PSYCH: mentation appears normal, affect normal/bright

## 2022-06-08 ENCOUNTER — THERAPY VISIT (OUTPATIENT)
Dept: PHYSICAL THERAPY | Facility: CLINIC | Age: 32
End: 2022-06-08
Payer: COMMERCIAL

## 2022-06-08 DIAGNOSIS — M25.562 ACUTE PAIN OF LEFT KNEE: Primary | ICD-10-CM

## 2022-06-08 PROCEDURE — 97112 NEUROMUSCULAR REEDUCATION: CPT | Mod: GP | Performed by: PHYSICAL THERAPIST

## 2022-06-08 PROCEDURE — 97110 THERAPEUTIC EXERCISES: CPT | Mod: GP | Performed by: PHYSICAL THERAPIST

## 2022-06-08 ASSESSMENT — PATIENT HEALTH QUESTIONNAIRE - PHQ9: SUM OF ALL RESPONSES TO PHQ QUESTIONS 1-9: 6

## 2022-06-15 ENCOUNTER — THERAPY VISIT (OUTPATIENT)
Dept: PHYSICAL THERAPY | Facility: CLINIC | Age: 32
End: 2022-06-15
Payer: COMMERCIAL

## 2022-06-15 DIAGNOSIS — M25.562 ACUTE PAIN OF LEFT KNEE: Primary | ICD-10-CM

## 2022-06-15 PROCEDURE — 97110 THERAPEUTIC EXERCISES: CPT | Mod: GP | Performed by: PHYSICAL THERAPIST

## 2022-06-15 PROCEDURE — 97112 NEUROMUSCULAR REEDUCATION: CPT | Mod: GP | Performed by: PHYSICAL THERAPIST

## 2022-06-15 ASSESSMENT — ACTIVITIES OF DAILY LIVING (ADL)
KNEE_ACTIVITY_OF_DAILY_LIVING_SUM: 47
HOW_WOULD_YOU_RATE_THE_CURRENT_FUNCTION_OF_YOUR_KNEE_DURING_YOUR_USUAL_DAILY_ACTIVITIES_ON_A_SCALE_FROM_0_TO_100_WITH_100_BEING_YOUR_LEVEL_OF_KNEE_FUNCTION_PRIOR_TO_YOUR_INJURY_AND_0_BEING_THE_INABILITY_TO_PERFORM_ANY_OF_YOUR_USUAL_DAILY_ACTIVITIES?: 50
AS_A_RESULT_OF_YOUR_KNEE_INJURY,_HOW_WOULD_YOU_RATE_YOUR_CURRENT_LEVEL_OF_DAILY_ACTIVITY?: NEARLY NORMAL
LIMPING: I HAVE THE SYMPTOM BUT IT DOES NOT AFFECT MY ACTIVITY
WEAKNESS: THE SYMPTOM AFFECTS MY ACTIVITY SLIGHTLY
STIFFNESS: THE SYMPTOM AFFECTS MY ACTIVITY SLIGHTLY
SWELLING: I HAVE THE SYMPTOM BUT IT DOES NOT AFFECT MY ACTIVITY
GO UP STAIRS: ACTIVITY IS SOMEWHAT DIFFICULT
PAIN: THE SYMPTOM AFFECTS MY ACTIVITY SLIGHTLY
RISE FROM A CHAIR: ACTIVITY IS SOMEWHAT DIFFICULT
RAW_SCORE: 47
SQUAT: ACTIVITY IS SOMEWHAT DIFFICULT
KNEE_ACTIVITY_OF_DAILY_LIVING_SCORE: 67.14
STAND: ACTIVITY IS MINIMALLY DIFFICULT
GIVING WAY, BUCKLING OR SHIFTING OF KNEE: I DO NOT HAVE THE SYMPTOM
KNEEL ON THE FRONT OF YOUR KNEE: ACTIVITY IS FAIRLY DIFFICULT
GO DOWN STAIRS: ACTIVITY IS SOMEWHAT DIFFICULT
HOW_WOULD_YOU_RATE_THE_OVERALL_FUNCTION_OF_YOUR_KNEE_DURING_YOUR_USUAL_DAILY_ACTIVITIES?: NEARLY NORMAL
SIT WITH YOUR KNEE BENT: ACTIVITY IS SOMEWHAT DIFFICULT
WALK: ACTIVITY IS MINIMALLY DIFFICULT

## 2022-06-17 ENCOUNTER — HOSPITAL ENCOUNTER (OUTPATIENT)
Dept: MRI IMAGING | Facility: CLINIC | Age: 32
Discharge: HOME | End: 2022-06-17
Attending: NURSE PRACTITIONER | Admitting: NURSE PRACTITIONER
Payer: COMMERCIAL

## 2022-06-17 DIAGNOSIS — M25.511 CHRONIC RIGHT SHOULDER PAIN: ICD-10-CM

## 2022-06-17 DIAGNOSIS — G89.29 CHRONIC RIGHT SHOULDER PAIN: ICD-10-CM

## 2022-06-17 PROCEDURE — 73221 MRI JOINT UPR EXTREM W/O DYE: CPT | Mod: RT

## 2022-06-17 PROCEDURE — 73221 MRI JOINT UPR EXTREM W/O DYE: CPT | Mod: 26 | Performed by: RADIOLOGY

## 2022-06-22 ENCOUNTER — THERAPY VISIT (OUTPATIENT)
Dept: PHYSICAL THERAPY | Facility: CLINIC | Age: 32
End: 2022-06-22
Payer: COMMERCIAL

## 2022-06-22 DIAGNOSIS — M25.562 ACUTE PAIN OF LEFT KNEE: Primary | ICD-10-CM

## 2022-06-22 PROCEDURE — 97112 NEUROMUSCULAR REEDUCATION: CPT | Mod: GP | Performed by: PHYSICAL THERAPIST

## 2022-06-22 PROCEDURE — 97110 THERAPEUTIC EXERCISES: CPT | Mod: GP | Performed by: PHYSICAL THERAPIST

## 2022-06-29 ENCOUNTER — OFFICE VISIT (OUTPATIENT)
Dept: ORTHOPEDICS | Facility: CLINIC | Age: 32
End: 2022-06-29
Attending: NURSE PRACTITIONER
Payer: COMMERCIAL

## 2022-06-29 VITALS
SYSTOLIC BLOOD PRESSURE: 134 MMHG | DIASTOLIC BLOOD PRESSURE: 92 MMHG | BODY MASS INDEX: 28.42 KG/M2 | WEIGHT: 203 LBS | HEIGHT: 71 IN

## 2022-06-29 DIAGNOSIS — M25.511 CHRONIC RIGHT SHOULDER PAIN: Primary | ICD-10-CM

## 2022-06-29 DIAGNOSIS — S46.811A TRAUMATIC TEAR OF SUPRASPINATUS TENDON, RIGHT, INITIAL ENCOUNTER: ICD-10-CM

## 2022-06-29 DIAGNOSIS — S43.003A SUBLUXATION OF TENDON OF LONG HEAD OF BICEPS: ICD-10-CM

## 2022-06-29 DIAGNOSIS — G89.29 CHRONIC RIGHT SHOULDER PAIN: Primary | ICD-10-CM

## 2022-06-29 DIAGNOSIS — M75.41 ROTATOR CUFF IMPINGEMENT SYNDROME OF RIGHT SHOULDER: ICD-10-CM

## 2022-06-29 DIAGNOSIS — S46.819A PARTIAL TEAR OF SUBSCAPULARIS TENDON, INITIAL ENCOUNTER: ICD-10-CM

## 2022-06-29 PROCEDURE — 99244 OFF/OP CNSLTJ NEW/EST MOD 40: CPT | Performed by: STUDENT IN AN ORGANIZED HEALTH CARE EDUCATION/TRAINING PROGRAM

## 2022-06-29 RX ORDER — MELOXICAM 15 MG/1
15 TABLET ORAL DAILY
Qty: 14 TABLET | Refills: 0 | Status: SHIPPED | OUTPATIENT
Start: 2022-06-29 | End: 2023-02-23

## 2022-06-29 NOTE — PROGRESS NOTES
ASSESSMENT & PLAN    1. Chronic right shoulder pain    2. Rotator cuff impingement syndrome of right shoulder    3. Traumatic tear of supraspinatus tendon, right, initial encounter    4. Partial tear of subscapularis tendon, initial encounter    5. Subluxation of tendon of long head of biceps      Presenting for evaluation of acute right shoulder pain x 2-2.5 months. History, exam and review of recent shoulder MRI showing low grade partial tears of the supraspinatus and subscapularis tendons of the rotator cuff in the setting of rotator cuff impingement and tendinosis. Also a component of biceps tendinitis with biceps subluxation. Evaluation raises concern for developing adhesive capsulitis (frozen shoulder) or parsonage duong syndrome given sudden, spontaneous onset without injury/event and with reduced motion on exam, although passive ROM is slightly greater than active. We discussed the importance of gentle range of motion and decreasing inflammation. We discussed treatment options including activity modifications (avoiding painful activities), physical therapy, anti-inflammatories, injections (cortisone vs PRP) or possible surgical referral if symptoms do not improve.    Detailed plan:  - A prescription has been sent for meloxicam (Mobic) 15 mg tabs. This is a stronger version of ibuprofen. Please take one tab every 24 hours with food (ie every day at breakfast) for the next 2 weeks. Do not use any other NSAIDS (ie ibuprofen) while taking the Mobic.  - Ice the shoulder for 10-15 minutes 3-4 times per day as needed.  - A referral has been placed for formal physical therapy (at the [place] location) - exercises to include pain free range of motion of the shoulder, scapular stabilization, stretching of the shoulder capsule, strengthening of the periscapular, rotator cuff, and deltoid muscles, with progression to functional rehabilitation with home exercise prescription.  - In the meantime, start gentle range of  motion (pendulum swings and wall crawls) to encourage increased mobility.  - Activity as tolerated based on pain. I can write a work letter at any time if you need one.     Please schedule a follow up appointment with me for 2-3 weeks from now. If your symptoms have not improved, we can go ahead with an ultrasound-guided cortisone injection of the right subacromial bursa at that time. You may call our direct clinic number ([phone]) at any time with questions or concerns.    Debora Mccarthy MD, [facility]      -----    SUBJECTIVE  Scooby Ellington is a/an 31 year old Right handed male who is seen in consultation at the request of  Caprice Jean-Baptiste C.N.P. for evaluation of right shoulder pain. The patient is seen by themselves.    Onset: 2-2.5 month(s) ago. Reports insidious onset without acute precipitating event.  Location of Pain: right posterior, lateral and anterior shoulder  Rating of Pain at worst: 8/10  Rating of Pain Currently: 6/10  Worsened by: sleeping on right shoulder, any movement of right shoulder  Better with: medications and ice provide very temporary relief  Treatments tried: rest/activity avoidance, ice, ibuprofen, other medications: Prednisone (Medrol) and topical analgesics, MRI right shoulder 6/17/22, xray right shoulder 5/24/22  Associated symptoms: radiating pain and tingling down right arm to hand (mainly 2nd-4th fingers), and decreased shoulder ROM, no mechanical symptoms  Orthopedic history: YES - h/o right shoulder injuries - previously completed PT for a different shoulder injury which has helped in the past  Relevant surgical history: NO  Social history: 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[employer]), previously worked as a     Past Medical History:   Diagnosis Date     History of cannabis abuse      History of cocaine abuse (H)      Social History     Socioeconomic History     Marital status:      Number of  "children: 3   Occupational History     Employer: [employer]   Tobacco Use     Smoking status: Former Smoker     Packs/day: 1.00     Years: 5.00     Pack years: 5.00     Types: Cigars     Start date: 1998     Quit date: 2022     Years since quittin.4     Smokeless tobacco: Former User     Tobacco comment: Occasional cigar use   Vaping Use     Vaping Use: Never used   Substance and Sexual Activity     Alcohol use: Yes     Comment: very rare     Drug use: Not Currently     Types: Marijuana     Sexual activity: Yes     Partners: Female   Other Topics Concern     Caffeine Concern No     Occupational Exposure No     Hobby Hazards No     Sleep Concern No     Comment: snores     Stress Concern No     Weight Concern No     Special Diet No     Back Care No     Exercise Yes     Seat Belt Yes         Patient's past medical, surgical, social, and family histories were reviewed today and no changes are noted.    REVIEW OF SYSTEMS:  10 point ROS is negative other than symptoms noted above in HPI, Past Medical History or as stated below  Constitutional: NEGATIVE for fever, chills, change in weight  Skin: NEGATIVE for worrisome rashes, moles or lesions  GI/: NEGATIVE for bowel or bladder changes  Neuro: NEGATIVE for weakness, dizziness or paresthesias    OBJECTIVE:  BP (!) 134/92   Ht 1.803 m (5' 11\")   Wt 92.1 kg (203 lb)   BMI 28.31 kg/m     General: healthy, alert and in no distress  HEENT: no scleral icterus or conjunctival erythema  Skin: no suspicious lesions or rash. No jaundice.  CV: regular rhythm by palpation  Resp: normal respiratory effort without conversational dyspnea   Psych: normal mood and affect  Gait: normal steady gait with appropriate coordination and balance  Neuro: normal light touch sensory exam of the bilateral upper extremities.    MSK:  RIGHT SHOULDER  Inspection:    No swelling, bruising, discoloration, or obvious deformity or asymmetry  Palpation:    Tender about the anterior capsule, " greater tuberosity, supraspinatus insertion, infraspinatus insertion, upper trapezius region and mild posterior capsule. Remainder of bony and tendinous landmarks are nontender.  Active/Passive Range of Motion:     Abduction 90/1050, FF 90/1050, ER 60/700, IR hip pocket. All limited by pain.   Strength:    Scapular plane abduction 5-/5, painful,  ER 5/5, IR 5-/5, painful, biceps 5/5, painful, triceps 5/5  Special Tests:    Positive: Neer's, Baumann', drop arm/painful arc, Speed's, Yergason's, mildly positive empty can, and Marion's limited by reduced mobility    Negative: Spurling    Independent visualization of the below image:    Results for orders placed or performed during the hospital encounter of 06/17/22   MR Shoulder Right w/o Contrast        MR right shoulder without  contrast 6/17/2022 5:43 PM    TECHNIQUE: Multiplanar, multisequence imaging of the right shoulder  were obtained without administration of intravenous or intra-articular  gadolinium contrast using routine protocol.    History: Shoulder pain, rotator cuff disorder suspected, xray done;  Chronic right shoulder pain; Chronic right shoulder pain    Comparison: Radiographs dated 5/24/2020    Findings:    ROTATOR CUFF and ASSOCIATED STRUCTURES  Rotator cuff: Superimposed on mild to moderate grade tendinopathy of  the distal supraspinatus tendon there is partial thickness, low-grade  intrasubstance tearing and bursal sided fraying of the far anterior  and mid fibers of the supraspinatus tendon. No full-thickness tear or  tendon retraction. There is moderate grade tendinopathy of the far  superior fibers of the subscapularis tendon with areas of small focal  partial thickness intrasubstance tear. The remainder of the rotator  cuff tendons, including the infraspinatus and teres minor tendons are  intact.     Bursa: Minimal subacromial or subdeltoid bursal fluid.    Musculature: Muscle bulk of rotator cuff is preserved.  Deltoid muscle  bulk is also  preserved. There is very mild edema within the teres  minor muscle belly     Acromioclavicular joint  There are mild degenerative changes of the acromioclavicular joint.  Mild thickening of the coracoacromial ligament is noted (series 5,  image 15). Acromion is type 2 in sagittal morphology.      OSSEOUS STRUCTURES  No fracture, marrow contusion or marrow infiltration.    LONG BICIPITAL TENDON  The long head of the biceps tendon is mildly subluxed medially from  the bicipital groove. No complete or partial biceps tendon tear is  present.    GLENOHUMERAL JOINT  Joint fluid: Physiologic amount of joint fluid is  present.    Cartilage and subarticular bone:  No focal hyaline cartilage defects  are noted. No Hill-Sachs, reverse Hill-Sachs, or bony Bankart lesions  are seen.    Labrum: Limited assessment on this study with relative lack of joint  distention shows no labral tear.    ANCILLARY FINDINGS:  Edema in the rotator interval        Impression:  1. Superimposed on mild to moderate grade tendinopathy of the distal  supraspinatus tendon, there is partial thickness, low-grade  intrasubstance tearing and bursal sided fraying of the far anterior  and mid fibers of the supraspinatus tendon. No full-thickness tear or  tendon retraction.   2. Superimposed on moderate grade tendinopathy of the subscapularis  tendon distally, small focal partial thickness intrasubstance tear  without significant tendon retraction. Associated medial subluxation  of the biceps tendon.  3. Muscle bulk of the rotator cuff remains preserved.  4. Edema in the rotator interval as can be seen in impingement.  5. Faint edema within the teres minor muscle without any radiographic  evidence for a causative lesion    JUTTA ELLERMANN, MD         XR SHOULDER 2 VIEW RIGHT 5/24/2022 2:23 PM      HISTORY: Chronic right shoulder pain; Chronic right shoulder pain     COMPARISON: None.                                                                     IMPRESSION: Normal joint spaces and alignment. No fracture or  dislocation.     MD Debora HINDS MD, CAM  [facility]

## 2022-06-29 NOTE — PATIENT INSTRUCTIONS
1. Chronic right shoulder pain    2. Rotator cuff impingement syndrome of right shoulder    3. Traumatic tear of supraspinatus tendon, right, initial encounter    4. Partial tear of subscapularis tendon, initial encounter    5. Subluxation of tendon of long head of biceps      Presenting for evaluation of acute right shoulder pain x 2-2.5 months. History, exam and review of recent shoulder MRI are consistent with low grade partial tears of the supraspinatus and subscapularis tendons of the rotator cuff in the setting of rotator cuff impingement and tendinosis. Also a component of biceps tendinitis. Differential includes early adhesive capsulitis (frozen shoulder). We discussed the importance of gentle range of motion and decreasing inflammation. We discussed treatment options including activity modifications (avoiding painful activities), physical therapy, anti-inflammatories, consideration of cortisone injection or possible surgical referral if symptoms do not improve.    Detailed plan:  - A prescription has been sent for meloxicam (Mobic) 15 mg tabs. This is a stronger version of ibuprofen. Please take one tab every 24 hours with food (ie every day at breakfast) for the next 2 weeks. Do not use any other NSAIDS (ie ibuprofen) while taking the Mobic.  - Ice the shoulder for 10-15 minutes 3-4 times per day as needed.  - A referral has been placed for formal physical therapy - exercises to include pain free range of motion of the shoulder, scapular stabilization, stretching of the shoulder capsule, strengthening of the periscapular, rotator cuff, and deltoid muscles, with progression to functional rehabilitation with home exercise prescription.  - In the meantime, start gentle range of motion (pendulum swings and wall crawls) to encourage increased mobility.  - Activity as tolerated based on pain. I can write a work letter at any time if you need one.     Please schedule a follow up appointment with me for 2-3 weeks  from now. If your symptoms have not improved, we can do a cortisone injection at that time. You may call our direct clinic number ([phone]) at any time with questions or concerns.    Debora Mccarthy MD, [facility] [place] [facility]

## 2022-07-12 DIAGNOSIS — F90.9 ATTENTION DEFICIT HYPERACTIVITY DISORDER (ADHD), UNSPECIFIED ADHD TYPE: ICD-10-CM

## 2022-07-12 RX ORDER — DEXTROAMPHETAMINE SACCHARATE, AMPHETAMINE ASPARTATE, DEXTROAMPHETAMINE SULFATE AND AMPHETAMINE SULFATE 7.5; 7.5; 7.5; 7.5 MG/1; MG/1; MG/1; MG/1
30 TABLET ORAL DAILY
Qty: 60 TABLET | Refills: 0 | Status: SHIPPED | OUTPATIENT
Start: 2022-07-12 | End: 2022-11-30

## 2022-07-12 NOTE — TELEPHONE ENCOUNTER
Pt called in requesting refill of Adderall.  Pt requesting 60 day / insurance will pay for 60 days.     Routed to Refill Pool.    Marlys Chadwick/EMT-B  [facility] / [place]

## 2022-07-12 NOTE — TELEPHONE ENCOUNTER
Routing refill request to provider for review/approval because:  Drug not on the [facility] refill protocol     Ly Mancia RN on 7/12/2022 at 10:48 AM

## 2022-07-20 ENCOUNTER — OFFICE VISIT (OUTPATIENT)
Dept: ORTHOPEDICS | Facility: CLINIC | Age: 32
End: 2022-07-20

## 2022-07-20 VITALS
WEIGHT: 203 LBS | SYSTOLIC BLOOD PRESSURE: 140 MMHG | BODY MASS INDEX: 28.42 KG/M2 | DIASTOLIC BLOOD PRESSURE: 66 MMHG | HEIGHT: 71 IN

## 2022-07-20 DIAGNOSIS — G89.29 CHRONIC RIGHT SHOULDER PAIN: Primary | ICD-10-CM

## 2022-07-20 DIAGNOSIS — M25.562 PATELLOFEMORAL JOINT PAIN, LEFT: ICD-10-CM

## 2022-07-20 DIAGNOSIS — M25.511 CHRONIC RIGHT SHOULDER PAIN: Primary | ICD-10-CM

## 2022-07-20 DIAGNOSIS — M76.50 PATELLAR TENDINOSIS: ICD-10-CM

## 2022-07-20 DIAGNOSIS — M75.41 ROTATOR CUFF IMPINGEMENT SYNDROME OF RIGHT SHOULDER: ICD-10-CM

## 2022-07-20 PROCEDURE — 20610 DRAIN/INJ JOINT/BURSA W/O US: CPT | Mod: RT | Performed by: STUDENT IN AN ORGANIZED HEALTH CARE EDUCATION/TRAINING PROGRAM

## 2022-07-20 PROCEDURE — 99214 OFFICE O/P EST MOD 30 MIN: CPT | Mod: 25 | Performed by: STUDENT IN AN ORGANIZED HEALTH CARE EDUCATION/TRAINING PROGRAM

## 2022-07-20 RX ORDER — TRIAMCINOLONE ACETONIDE 40 MG/ML
40 INJECTION, SUSPENSION INTRA-ARTICULAR; INTRAMUSCULAR
Status: SHIPPED | OUTPATIENT
Start: 2022-07-20

## 2022-07-20 RX ORDER — NITROGLYCERIN 0.1MG/HR
1 PATCH, TRANSDERMAL 24 HOURS TRANSDERMAL DAILY
Qty: 30 PATCH | Refills: 1 | Status: SHIPPED | OUTPATIENT
Start: 2022-07-20 | End: 2022-08-17

## 2022-07-20 RX ORDER — LIDOCAINE HYDROCHLORIDE 10 MG/ML
4 INJECTION, SOLUTION INFILTRATION; PERINEURAL
Status: SHIPPED | OUTPATIENT
Start: 2022-07-20

## 2022-07-20 RX ADMIN — LIDOCAINE HYDROCHLORIDE 4 ML: 10 INJECTION, SOLUTION INFILTRATION; PERINEURAL at 08:07

## 2022-07-20 RX ADMIN — TRIAMCINOLONE ACETONIDE 40 MG: 40 INJECTION, SUSPENSION INTRA-ARTICULAR; INTRAMUSCULAR at 08:07

## 2022-07-20 NOTE — PATIENT INSTRUCTIONS
1. Chronic right shoulder pain    2. Rotator cuff impingement syndrome of right shoulder    3. Patellar tendinosis, left knee    4. Patellofemoral joint pain, left      Presenting for cortisone injection of the right subacromial bursa for right shoulder impingement with bursitis. MRI previously reviewed also showing tendinosis with low grade partial tearing of the supraspinatus and subscapularis tendons. Only noted temporary relief from meloxicam. Upon discussion, patient elects to proceed with the following:  - A right shoulder subacromial cortisone injection was performed in clinic today without complication and with immediate pain relief noted. Please see post-injection instructions below.  - Please take it easy for the next 1-2 weeks then gradually increase your activities as tolerated based on pain.  - Formal physical therapy to begin next week.    Evaluation of the left knee is consistent with patellar tendinosis with underlying patellofemoral maltracking. Ongoing pain despite 4 sessions of physical therapy thusfar. We discussed management options including relative rest, ongoing physical therapy, injections (cortisone, PRP, hyaluronic acid), and the timing of advanced imaging (MRI).  - Given the component of patellar tenidnosis, plan to trial nitroglycerin patches. Cut the patch into quarters and apply 1/4 patch to the most painful spot. Replace the patch every 24 hours. If tolerating well after 1 week, increase to 1/2 patch daily.  - Continue formal physical therapy and home exercises to continue strengthening the knee. May trial patellofemoral taping with your physical therapist.  - Avoid painful activities.     - Please let me know how things are going via phone or [employer] message in 3-4 weeks. If pain has not improved, I will order an MRI for further evaluation of the patellar tendon, kneecap and cartilage.    Plan to return to clinic in 6-8 weeks for follow up of the shoulder and knee, or sooner as  needed. You may call our direct clinic number ([phone]) at any time with questions or concerns.    Debora Mccarthy MD, [facility]    JOINT INJECTION AFTERCARE    After any kind of joint injection, it is not uncommon to experience:  - Soreness, swelling or bruising around the injection site.  - Mild numbness, tingling or weakness around the injection site caused by the numbing medicine used before or with the injection.     It is also possible to experience the following effects associated with the specific agent after injection:  - Allergic reaction.  - Increased blood sugar levels for 10-14 days following cortisone injection. If you have diabetes and notice that your blood sugar levels have increased, notify your primary care physician.  - Increased blood pressure levels.  - Mood swings.  - Increased fluid accumulation in the injected joint.    These effects all should resolve within a day or two of your procedure.    Please note that it may take up to 14 days for the steroid (cortisone) medication to start working.     HOME CARE INSTRUCTIONS    - Limit yourself to light activity activity on the day of your procedure. Avoid lifting heavy objects, bending, stooping or twisting.   - You may shower, but please avoid swimming, hot tubs or tub baths for 24 hours following the procedure.   - Take over-the-counter pain medication (NSAIDS or Tylenol) for pain control after your procedure as needed.    - You may use ice packs for 10-15 minutes 3-4 times per day at the injection site to reduce pain and swelling after your procedure.   + Put ice in a plastic bag  + Place a towel between your skin and the ice bag  + Leave the ice on for no longer than 20 minutes each time to avoid injuring your skin or nerves  + This can be repeated 3-4 times per day for a few days after the injection    SEEK IMMEDIATE MEDICAL CARE IF:    - Pain and swelling get worse rather than better or  extend beyond the injection site  - Numbness does not go away after a few hours  - Blood or fluid continues to leak from the injection site  - You experience chest pain  - You have swelling of your face or tongue  - You have trouble breathing or become dizzy  - You develop fever, chills or severe tenderness at the injection site that lasts longer than 1 day    MAKE SURE YOU:    - Understand these instructions  - Watch your condition  - Get help right away if you are not doing well or if you get worse

## 2022-07-20 NOTE — PROGRESS NOTES
ASSESSMENT & PLAN    1. Chronic right shoulder pain    2. Rotator cuff impingement syndrome of right shoulder    3. Patellar tendinosis, left knee    4. Patellofemoral joint pain, left      Presenting for cortisone injection of the right subacromial bursa for right shoulder impingement with bursitis. MRI previously reviewed also showing tendinosis with low grade partial tearing of the supraspinatus and subscapularis tendons. Only noted temporary relief from meloxicam. Upon discussion, patient elects to proceed with the following:  - A right shoulder subacromial cortisone injection was performed in clinic today without complication and with immediate pain relief noted. Please see post-injection instructions below.  - Please take it easy for the next 1-2 weeks then gradually increase your activities as tolerated based on pain.  - Formal physical therapy to begin next week.    Evaluation of the left knee is consistent with patellar tendinosis with underlying patellofemoral maltracking. Previous knee XR series from PCP demonstrates slight patella nasra. Axial view was not included. At his next visit, we will get an axial view at 20 degrees of knee flexion and long leg alignment films. Ongoing pain despite 4 sessions of physical therapy thusfar. We discussed management options including relative rest, ongoing physical therapy, injections (cortisone, PRP, hyaluronic acid), and the timing of advanced imaging (MRI).  - Given the component of patellar tenidnosis, plan to trial nitroglycerin patches. Cut the patch into quarters and apply 1/4 patch to the most painful spot. Replace the patch every 24 hours. If tolerating well after 1 week, increase to 1/2 patch daily.  - Continue formal physical therapy and home exercises to continue strengthening the knee. May trial patellofemoral taping with your physical therapist.  - Avoid painful activities.     - Please let me know how things are going via phone or [employer] message in 3-4  "weeks. If pain has not improved, I will order an MRI for further evaluation of the patellar tendon, kneecap and cartilage.    Plan to return to clinic in 6-8 weeks for follow up of the shoulder and knee, or sooner as needed. You may call our direct clinic number ([phone]) at any time with questions or concerns.    Debora Mccarthy MD, [facility]    -----    SUBJECTIVE:  Scooby Ellington is a 31 year old male who is seen in follow-up for right shoulder pain. They were last seen 6/29/2022.     Since their last visit reports slight improvement. He reports some relief while taking the Meloxicam however since stopping that medication pain has increased. He reports some improvement in range of motion. They indicate that their current pain level is 3.5-4/10. They have tried rest/activity avoidance and other medications: meloxicam.    Patient also complains of left knee pain ongoing for ~ 2-2.5 months. He denies acute injury or trauma. Patient notes pain is located in left anterior knee. Pain increases with kneeling, squatting / bending, going up stairs. Pain improves with rest / activity avoidance. Patient reports pain at worst is 8/10 and currently is 2/10. Patient has tried xray 5/24/22, physical therapy (4 visits), home exercises.    The patient is seen by themselves.    Patient's past medical, surgical, social, and family histories were reviewed today and no changes are noted.    REVIEW OF SYSTEMS:  Constitutional: NEGATIVE for fever, chills, change in weight  Skin: NEGATIVE for worrisome rashes, moles or lesions  GI/: NEGATIVE for bowel or bladder changes  Neuro: NEGATIVE for weakness, dizziness or paresthesias    OBJECTIVE:  BP (!) 140/66   Ht 1.803 m (5' 11\")   Wt 92.1 kg (203 lb)   BMI 28.31 kg/m     General: healthy, alert and in no distress  HEENT: no scleral icterus or conjunctival erythema  Skin: no suspicious lesions or rash. No jaundice.  CV: regular " rhythm by palpation, no pedal edema  Resp: normal respiratory effort without conversational dyspnea   Psych: normal mood and affect  Gait: normal steady gait with appropriate coordination and balance  Neuro: normal light touch sensory exam of the extremities.    MSK:  LEFT KNEE  Inspection:    Mild patella nasra. Normal alignment; no edema, erythema, or ecchymosis present  Palpation:    Tender about the patellar tendon, medial and lateral patellar facet. Remainder of bony and ligamentous landmarks are nontender.    Trace effusion is present    Patellofemoral crepitus is Present  Range of Motion:     00 extension to 1100 flexion    Left patella hypomobile  Strength:    Quadriceps 5-/5 and hamstrings 5/5    Extensor mechanism intact    Dynamic knee valgus with single leg squat  Special Tests:    Positive: Patellar grind, patellar inhibition, soft J sign    Negative: MCL/valgus stress (0 & 30 deg), LCL/varus stress (0 & 30 deg), Lachman's, anterior drawer, posterior drawer, Erin's, Thessaly's    RIGHT SHOULDER  Inspection:    No swelling, bruising, discoloration, or obvious deformity or asymmetry  Palpation:    Tender about the anterior capsule, greater tuberosity, supraspinatus insertion, infraspinatus insertion, upper trapezius region and mild posterior capsule. Remainder of bony and tendinous landmarks are nontender.  Active/Passive Range of Motion:     Abduction 1200, FF 1200, , IR L4. All limited by pain.   Strength:    Scapular plane abduction 5-/5, painful,  ER 5/5, IR 5-/5, painful, biceps 5/5, painful, triceps 5/5  Special Tests:    Positive: Neer's, Baumann', drop arm/painful arc, mildly positive empty can    Negative: Speed's, Yergason's, Spurling    Independent visualization of the below image:    XR KNEE LT 1/2 VW 5/24/2022 2:22 PM      COMPARISON: 5/20/2008                                                                    IMPRESSION: Normal joint spaces and alignment. No fracture or  definitive  joint effusion.     LSIA EDWARD MD     MR right shoulder without  contrast 6/17/2022 5:43 PM                                                 Impression:  1. Superimposed on mild to moderate grade tendinopathy of the distal  supraspinatus tendon, there is partial thickness, low-grade  intrasubstance tearing and bursal sided fraying of the far anterior  and mid fibers of the supraspinatus tendon. No full-thickness tear or  tendon retraction.   2. Superimposed on moderate grade tendinopathy of the subscapularis  tendon distally, small focal partial thickness intrasubstance tear  without significant tendon retraction. Associated medial subluxation  of the biceps tendon.  3. Muscle bulk of the rotator cuff remains preserved.  4. Edema in the rotator interval as can be seen in impingement.  5. Faint edema within the teres minor muscle without any radiographic  evidence for a causative lesion     JUTTA ELLERMANN, MD     Large Joint Injection/Arthocentesis: R subacromial bursa    Date/Time: 7/20/2022 8:07 AM  Performed by: Debora Brantley MD  Authorized by: Debora Brantley MD     Indications:  Pain  Needle Size:  25 G  Guidance: landmark guided    Approach:  Posterior  Location:  Shoulder      Site:  R subacromial bursa  Medications:  40 mg triamcinolone 40 MG/ML; 4 mL lidocaine 1 %  Outcome:  Tolerated well, no immediate complications  Procedure discussed: discussed risks, benefits, and alternatives    Consent Given by:  Patient  Timeout: timeout called immediately prior to procedure    Prep: patient was prepped and draped in usual sterile fashion      Patient reported complete relief of pain following injection.    Debora Mccarthy MD, [facility]

## 2022-07-20 NOTE — LETTER
7/20/2022         RE: Scooby Ellington  [address]  [place] [address]        Dear Colleague,    Thank you for referring your patient, Scooby Ellington, to the [facility] [place]. Please see a copy of my visit note below.    ASSESSMENT & PLAN    1. Chronic right shoulder pain    2. Rotator cuff impingement syndrome of right shoulder    3. Patellar tendinosis, left knee    4. Patellofemoral joint pain, left      Presenting for cortisone injection of the right subacromial bursa for right shoulder impingement with bursitis. MRI previously reviewed also showing tendinosis with low grade partial tearing of the supraspinatus and subscapularis tendons. Only noted temporary relief from meloxicam. Upon discussion, patient elects to proceed with the following:  - A right shoulder subacromial cortisone injection was performed in clinic today without complication and with immediate pain relief noted. Please see post-injection instructions below.  - Please take it easy for the next 1-2 weeks then gradually increase your activities as tolerated based on pain.  - Formal physical therapy to begin next week.    Evaluation of the left knee is consistent with patellar tendinosis with underlying patellofemoral maltracking. Previous knee XR series from PCP demonstrates slight patella nasra. Axial view was not included. At his next visit, we will get an axial view at 20 degrees of knee flexion and long leg alignment films. Ongoing pain despite 4 sessions of physical therapy thusfar. We discussed management options including relative rest, ongoing physical therapy, injections (cortisone, PRP, hyaluronic acid), and the timing of advanced imaging (MRI).  - Given the component of patellar tenidnosis, plan to trial nitroglycerin patches. Cut the patch into quarters and apply 1/4 patch to the most painful spot. Replace the patch every 24 hours. If tolerating well after 1 week, increase to 1/2  patch daily.  - Continue formal physical therapy and home exercises to continue strengthening the knee. May trial patellofemoral taping with your physical therapist.  - Avoid painful activities.     - Please let me know how things are going via phone or "[employer] message in 3-4 weeks. If pain has not improved, I will order an MRI for further evaluation of the patellar tendon, kneecap and cartilage.    Plan to return to clinic in 6-8 weeks for follow up of the shoulder and knee, or sooner as needed. You may call our direct clinic number ([phone]) at any time with questions or concerns.    Debora Mccarthy MD, [facility]    -----    SUBJECTIVE:  Scooby Ellington is a 31 year old male who is seen in follow-up for right shoulder pain. They were last seen 6/29/2022.     Since their last visit reports slight improvement. He reports some relief while taking the Meloxicam however since stopping that medication pain has increased. He reports some improvement in range of motion. They indicate that their current pain level is 3.5-4/10. They have tried rest/activity avoidance and other medications: meloxicam.    Patient also complains of left knee pain ongoing for ~ 2-2.5 months. He denies acute injury or trauma. Patient notes pain is located in left anterior knee. Pain increases with kneeling, squatting / bending, going up stairs. Pain improves with rest / activity avoidance. Patient reports pain at worst is 8/10 and currently is 2/10. Patient has tried xray 5/24/22, physical therapy (4 visits), home exercises.    The patient is seen by themselves.    Patient's past medical, surgical, social, and family histories were reviewed today and no changes are noted.    REVIEW OF SYSTEMS:  Constitutional: NEGATIVE for fever, chills, change in weight  Skin: NEGATIVE for worrisome rashes, moles or lesions  GI/: NEGATIVE for bowel or bladder changes  Neuro: NEGATIVE for weakness,  "dizziness or paresthesias    OBJECTIVE:  BP (!) 140/66   Ht 1.803 m (5' 11\")   Wt 92.1 kg (203 lb)   BMI 28.31 kg/m     General: healthy, alert and in no distress  HEENT: no scleral icterus or conjunctival erythema  Skin: no suspicious lesions or rash. No jaundice.  CV: regular rhythm by palpation, no pedal edema  Resp: normal respiratory effort without conversational dyspnea   Psych: normal mood and affect  Gait: normal steady gait with appropriate coordination and balance  Neuro: normal light touch sensory exam of the extremities.    MSK:  LEFT KNEE  Inspection:    Mild patella nasra. Normal alignment; no edema, erythema, or ecchymosis present  Palpation:    Tender about the patellar tendon, medial and lateral patellar facet. Remainder of bony and ligamentous landmarks are nontender.    Trace effusion is present    Patellofemoral crepitus is Present  Range of Motion:     00 extension to 1100 flexion    Left patella hypomobile  Strength:    Quadriceps 5-/5 and hamstrings 5/5    Extensor mechanism intact    Dynamic knee valgus with single leg squat  Special Tests:    Positive: Patellar grind, patellar inhibition, soft J sign    Negative: MCL/valgus stress (0 & 30 deg), LCL/varus stress (0 & 30 deg), Lachman's, anterior drawer, posterior drawer, Erin's, Thessaly's    RIGHT SHOULDER  Inspection:    No swelling, bruising, discoloration, or obvious deformity or asymmetry  Palpation:    Tender about the anterior capsule, greater tuberosity, supraspinatus insertion, infraspinatus insertion, upper trapezius region and mild posterior capsule. Remainder of bony and tendinous landmarks are nontender.  Active/Passive Range of Motion:     Abduction 1200, FF 1200, , IR L4. All limited by pain.   Strength:    Scapular plane abduction 5-/5, painful,  ER 5/5, IR 5-/5, painful, biceps 5/5, painful, triceps 5/5  Special Tests:    Positive: Neer's, Baumann', drop arm/painful arc, mildly positive empty can    Negative: " Speed's, Emy's, Spurling    Independent visualization of the below image:    XR KNEE LT 1/2 VW 5/24/2022 2:22 PM      COMPARISON: 5/20/2008                                                                    IMPRESSION: Normal joint spaces and alignment. No fracture or  definitive joint effusion.     LISA EDWARD MD     MR right shoulder without  contrast 6/17/2022 5:43 PM                                                 Impression:  1. Superimposed on mild to moderate grade tendinopathy of the distal  supraspinatus tendon, there is partial thickness, low-grade  intrasubstance tearing and bursal sided fraying of the far anterior  and mid fibers of the supraspinatus tendon. No full-thickness tear or  tendon retraction.   2. Superimposed on moderate grade tendinopathy of the subscapularis  tendon distally, small focal partial thickness intrasubstance tear  without significant tendon retraction. Associated medial subluxation  of the biceps tendon.  3. Muscle bulk of the rotator cuff remains preserved.  4. Edema in the rotator interval as can be seen in impingement.  5. Faint edema within the teres minor muscle without any radiographic  evidence for a causative lesion     JUTTA ELLERMANN, MD     Large Joint Injection/Arthocentesis: R subacromial bursa    Date/Time: 7/20/2022 8:07 AM  Performed by: Debora Brantley MD  Authorized by: Debora Brantley MD     Indications:  Pain  Needle Size:  25 G  Guidance: landmark guided    Approach:  Posterior  Location:  Shoulder      Site:  R subacromial bursa  Medications:  40 mg triamcinolone 40 MG/ML; 4 mL lidocaine 1 %  Outcome:  Tolerated well, no immediate complications  Procedure discussed: discussed risks, benefits, and alternatives    Consent Given by:  Patient  Timeout: timeout called immediately prior to procedure    Prep: patient was prepped and draped in usual sterile fashion      Patient reported complete relief of pain following  injection.    Debora Mccarthy MD, [facility]              Again, thank you for allowing me to participate in the care of your patient.        Sincerely,        Debora Mccarthy MD

## 2022-07-28 ENCOUNTER — THERAPY VISIT (OUTPATIENT)
Dept: PHYSICAL THERAPY | Facility: CLINIC | Age: 32
End: 2022-07-28
Payer: COMMERCIAL

## 2022-07-28 DIAGNOSIS — M25.511 SHOULDER PAIN, RIGHT: Primary | ICD-10-CM

## 2022-07-28 PROCEDURE — 97140 MANUAL THERAPY 1/> REGIONS: CPT | Mod: GP

## 2022-07-28 PROCEDURE — 97161 PT EVAL LOW COMPLEX 20 MIN: CPT | Mod: GP

## 2022-07-28 PROCEDURE — 97110 THERAPEUTIC EXERCISES: CPT | Mod: GP

## 2022-07-28 NOTE — PROGRESS NOTES
Physical Therapy Initial Evaluation  Therapist Impression: Scooby is a 31 year old year old male referred to physical therapy by Dr. Debora Mccarthy for treatment of R shoulder pain. 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 Due to these impairments, patient has difficulty with reaching, lifting, pushing/pulling. Patient will benefit from skilled PT to address impairments/limitations in order to reach patient's goals, facilitate return to prior level of function, and maximize participation.    KEY FINDINGS:  1. R shoulder AROM/PROM limited  2. Pain w/ resisted shoulder IR/ER and elbow flexion  3. Clear cervical screen    Subjective:  The history is provided by the patient. No  was used.   Therapist Generated HPI Evaluation  Problem details: Pt presents with R shoulder pain that started about 3 months ago. NKI. Progressively pain, weakness and tingling in arm. Works for [employer], so active job requiring use of arms. Got CSI 7/20/22, has felt a little better since then, slightly more ROM. Pain is impacting sleep. Hasn't had any tingling/numbness for last three weeks. Pain worsens as work day goes on, gets through about half his work day before really bothersome. Used to be a .    6/17/22 R shoulder MRI Impression:  1. Superimposed on mild to moderate grade tendinopathy of the distal  supraspinatus tendon, there is partial thickness, low-grade  intrasubstance tearing and bursal sided fraying of the far anterior  and mid fibers of the supraspinatus tendon. No full-thickness tear or  tendon retraction.   2. Superimposed on moderate grade tendinopathy of the subscapularis  tendon distally, small focal partial thickness intrasubstance tear  without significant tendon retraction. Associated medial subluxation  of the biceps tendon.  3. Muscle bulk of the rotator cuff remains preserved.  4. Edema in the rotator interval as can be seen in  impingement.  5. Faint edema within the teres minor muscle without any radiographic  evidence for a causative lesion.         Type of problem:  Right shoulder.    This is a chronic condition.  Condition occurred with:  Unknown cause.  Where condition occurred: for unknown reasons.  Patient reports pain:  Anterior and posterior.  Pain is described as sharp and aching and is intermittent.  Pain radiates to:  Upper arm. Pain timing: not time dependent.  Progression since onset: better since injection.  Associated symptoms:  Loss of strength and loss of motion/stiffness (popping w/ certain movements). Symptoms are exacerbated by using arm behind back, using arm overhead, lifting, carrying and lying on extremity (driving)  and relieved by rest.  Special tests included:  MRI (see above).    Restrictions due to condition include:  Working in normal job without restrictions.  Barriers include:  None as reported by patient.    Patient Health History         Pain is reported as 3/10 on pain scale.  General health as reported by patient is good.  Pertinent medical history includes: depression, chemical dependency, mental illness and numbness/tingling (weakness).   Red flags:  None as reported by patient.     Surgeries: eye, appendix.    Current medications:  Anti-depressants.    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   Primary job tasks include:  Operating a machine/assembly, pushing/pulling, prolonged standing, repetitive tasks and driving.                                    Objective:  System              Cervical/Thoracic Evaluation  Cervical AROM: normal (no change shoulder symptoms)                                  Shoulder Evaluation:  ROM:  AROM:  : *indicates pain.  Flexion:  Left:  155    Right:  130    Abduction:  Left: 145   Right:  106*    Internal Rotation:  Left:  T8    Right:  L1*  External Rotation:  Left:  65    Right:  65                PROM:    Flexion:  Right: 125*      Abduction:  Right:   115*    Internal Rotation:  Right:  30* (at 90 deg ABD)                  Pain: empty end feel FE, ABD, tissue stretch IR    Strength:  : pain eblow flex, IR/eR.  Flexion: Left:5/5    Pain: -    Right: 5/5      Pain:  -    Abduction:  Left: 5/5   Pain:-    Right: 5/5      Pain:-    Internal Rotation:  Left:5/5      Pain:-    Right: 5-/5      Pain:+  External Rotation:   Left:5/5      Pain:-   Right:5-/5      Pain:+    Horizontal Abduction:  Left:5/5      Pain:-    Right:5/5     Pain:-  Horizontal Adduction:  Left:5/5      Pain:-    Right:5-/5     Pain:  Elbow Flexion:  Left:5/5      Pain:-    Right:5/5      Pain:+  Elbow Extension:  Left:5/5      Pain:-    Right:5/5      Pain:-    Special Tests:      Right shoulder positive for the following special tests:Impingement (HK and Neers) and Acrimioclavicular  Palpation:      Right shoulder tenderness present at: Supraspinatus; Infraspinatus; Teres Minor and Bicipital Groove  Right shoulder tenderness not present at:Acrimioclavicular; Biceps; Triceps; Levator; Rhomboids or Upper Trap                                     General     ROS    Assessment/Plan:    Patient is a 31 year old male with right side shoulder complaints.    Patient has the following significant findings with corresponding treatment plan.                Diagnosis 1:  R shoulder pain  Pain -  hot/cold therapy, manual therapy, splint/taping/bracing/orthotics, self management, education and home program  Decreased ROM/flexibility - manual therapy, therapeutic exercise, therapeutic activity and home program  Decreased joint mobility - manual therapy, therapeutic exercise, therapeutic activity and home program  Decreased strength - therapeutic exercise, therapeutic activities and home program  Decreased function - therapeutic activities and home program    Therapy Evaluation Codes:   Cumulative Therapy Evaluation is: Low complexity.    Previous and current functional limitations:  (See Goal Flow Sheet for this  information)    Short term and Long term goals: (See Goal Flow Sheet for this information)     Communication ability:  Patient appears to be able to clearly communicate and understand verbal and written communication and follow directions correctly.  Treatment Explanation - The following has been discussed with the patient:   RX ordered/plan of care  Anticipated outcomes  Possible risks and side effects  This patient would benefit from PT intervention to resume normal activities.   Rehab potential is excellent.    Frequency:  1 X week, once daily  Duration:  for 6 weeks tapering to 2 X a month over 4 weeks  Discharge Plan:  Achieve all LTG.  Independent in home treatment program.  Reach maximal therapeutic benefit.    Please refer to the daily flowsheet for treatment today, total treatment time and time spent performing 1:1 timed codes.

## 2022-08-12 DIAGNOSIS — M76.50 PATELLAR TENDINOSIS: ICD-10-CM

## 2022-08-16 ENCOUNTER — THERAPY VISIT (OUTPATIENT)
Dept: PHYSICAL THERAPY | Facility: CLINIC | Age: 32
End: 2022-08-16
Payer: COMMERCIAL

## 2022-08-16 DIAGNOSIS — M25.511 SHOULDER PAIN, RIGHT: Primary | ICD-10-CM

## 2022-08-16 DIAGNOSIS — M25.562 ACUTE PAIN OF LEFT KNEE: ICD-10-CM

## 2022-08-16 PROCEDURE — 97110 THERAPEUTIC EXERCISES: CPT | Mod: GP | Performed by: PHYSICAL THERAPIST

## 2022-08-16 NOTE — TELEPHONE ENCOUNTER
Medication Request for: Nitroglycerin     Prescription last written on 7/20/2022 by Dr. Debora Mccarthy  Sig: Place one patch onto skin daily  Last Fill Quantity: 30 with # refills: 0     Last Office Visit by provider: 7/20/2022  Future Office Visit:   None scheduled  Pharmacy selected in [facility].    Phone number patient can be reached at: Home number on file [phone] (home)    Can we leave a detailed message on this number? YES    Medication requests may take up to 3 business days for a response  Has patient been notified of this Unknown

## 2022-08-17 RX ORDER — NITROGLYCERIN 0.1MG/HR
PATCH, TRANSDERMAL 24 HOURS TRANSDERMAL
Qty: 30 PATCH | Refills: 1 | Status: SHIPPED | OUTPATIENT
Start: 2022-08-17

## 2022-09-02 ENCOUNTER — TELEPHONE (OUTPATIENT)
Dept: ORTHOPEDICS | Facility: CLINIC | Age: 32
End: 2022-09-02

## 2022-09-02 NOTE — TELEPHONE ENCOUNTER
Received faxed request for a 90 day supply for Nitroglycerin 0.1mg/hr patch.     Left voicemail for [facility]  informing them we will not be providing a request for 90 days.     RICO Shetty RN

## 2022-09-22 NOTE — PROGRESS NOTES
DISCHARGE SUMMARY    Scooby Ellington was seen 2 times for evaluation and treatment.  Patient did not return for further treatment and current status is unknown.      Please see goal flow sheet from episode noted date below and initial evaluation for further information.  Patient is discharged from therapy and therapy episode is resolved as of 09/22/22.      Linked Episodes   Type: Episode: Status: Noted: Resolved: Last update: Updated by:   PHYSICAL THERAPY L knee pain 6/1/22 Active 6/1/2022 8/16/2022  4:25 PM Kaleigh Mauro, PT      Comments:   PHYSICAL THERAPY R shoulder 7/28/22 Active 7/28/2022 8/16/2022  4:25 PM Naomi Coy, JUNIE      Comments:

## 2022-10-07 DIAGNOSIS — F33.2 SEVERE EPISODE OF RECURRENT MAJOR DEPRESSIVE DISORDER, WITHOUT PSYCHOTIC FEATURES (H): ICD-10-CM

## 2022-10-07 DIAGNOSIS — F90.9 ATTENTION DEFICIT HYPERACTIVITY DISORDER (ADHD), UNSPECIFIED ADHD TYPE: ICD-10-CM

## 2022-10-10 RX ORDER — BUPROPION HYDROCHLORIDE 300 MG/1
TABLET ORAL
Qty: 90 TABLET | Refills: 1 | OUTPATIENT
Start: 2022-10-10

## 2022-10-10 RX ORDER — BUPROPION HYDROCHLORIDE 150 MG/1
TABLET ORAL
Qty: 90 TABLET | Refills: 1 | OUTPATIENT
Start: 2022-10-10

## 2022-10-15 ENCOUNTER — HEALTH MAINTENANCE LETTER (OUTPATIENT)
Age: 32
End: 2022-10-15

## 2022-11-28 DIAGNOSIS — F90.9 ATTENTION DEFICIT HYPERACTIVITY DISORDER (ADHD), UNSPECIFIED ADHD TYPE: ICD-10-CM

## 2022-11-28 NOTE — TELEPHONE ENCOUNTER
Pt has visit with provider in 1/18/23. Pt is wondering if he could get refill now to last until the visit in January.    Routed to Refill Pool.    Marlys Chadwick/EMT-B  [facility] / [place]

## 2022-11-28 NOTE — TELEPHONE ENCOUNTER
Routing refill request to provider for review/approval because:  Drug not on the [facility] refill protocol     Ly Mancia RN on 11/28/2022 at 12:03 PM

## 2022-11-30 RX ORDER — DEXTROAMPHETAMINE SACCHARATE, AMPHETAMINE ASPARTATE, DEXTROAMPHETAMINE SULFATE AND AMPHETAMINE SULFATE 7.5; 7.5; 7.5; 7.5 MG/1; MG/1; MG/1; MG/1
30 TABLET ORAL DAILY
Qty: 60 TABLET | Refills: 0 | Status: SHIPPED | OUTPATIENT
Start: 2022-11-30

## 2023-01-18 ENCOUNTER — OFFICE VISIT (OUTPATIENT)
Dept: FAMILY MEDICINE | Facility: CLINIC | Age: 33
End: 2023-01-18
Payer: COMMERCIAL

## 2023-01-18 VITALS
BODY MASS INDEX: 30.64 KG/M2 | HEIGHT: 70 IN | RESPIRATION RATE: 14 BRPM | OXYGEN SATURATION: 97 % | WEIGHT: 214 LBS | SYSTOLIC BLOOD PRESSURE: 104 MMHG | HEART RATE: 91 BPM | DIASTOLIC BLOOD PRESSURE: 60 MMHG

## 2023-01-18 DIAGNOSIS — Z00.00 ROUTINE GENERAL MEDICAL EXAMINATION AT A HEALTH CARE FACILITY: Primary | ICD-10-CM

## 2023-01-18 DIAGNOSIS — F90.9 ATTENTION DEFICIT HYPERACTIVITY DISORDER (ADHD), UNSPECIFIED ADHD TYPE: ICD-10-CM

## 2023-01-18 DIAGNOSIS — Z11.59 NEED FOR HEPATITIS C SCREENING TEST: ICD-10-CM

## 2023-01-18 DIAGNOSIS — Z11.4 SCREENING FOR HUMAN IMMUNODEFICIENCY VIRUS: ICD-10-CM

## 2023-01-18 DIAGNOSIS — F33.2 SEVERE EPISODE OF RECURRENT MAJOR DEPRESSIVE DISORDER, WITHOUT PSYCHOTIC FEATURES (H): ICD-10-CM

## 2023-01-18 PROBLEM — H40.9 GLAUCOMA OF LEFT EYE: Status: ACTIVE | Noted: 2023-01-18

## 2023-01-18 LAB
ERYTHROCYTE [DISTWIDTH] IN BLOOD BY AUTOMATED COUNT: 13.3 % (ref 10–15)
HBA1C MFR BLD: 6 % (ref 0–5.6)
HCT VFR BLD AUTO: 46.4 % (ref 40–53)
HGB BLD-MCNC: 15.6 G/DL (ref 13.3–17.7)
MCH RBC QN AUTO: 29.4 PG (ref 26.5–33)
MCHC RBC AUTO-ENTMCNC: 33.6 G/DL (ref 31.5–36.5)
MCV RBC AUTO: 87 FL (ref 78–100)
PLATELET # BLD AUTO: 204 10E3/UL (ref 150–450)
RBC # BLD AUTO: 5.31 10E6/UL (ref 4.4–5.9)
TSH SERPL DL<=0.005 MIU/L-ACNC: 1.29 UIU/ML (ref 0.3–4.2)
WBC # BLD AUTO: 8.9 10E3/UL (ref 4–11)

## 2023-01-18 PROCEDURE — 99213 OFFICE O/P EST LOW 20 MIN: CPT | Mod: 25 | Performed by: FAMILY MEDICINE

## 2023-01-18 PROCEDURE — 87389 HIV-1 AG W/HIV-1&-2 AB AG IA: CPT | Performed by: FAMILY MEDICINE

## 2023-01-18 PROCEDURE — 99395 PREV VISIT EST AGE 18-39: CPT | Performed by: FAMILY MEDICINE

## 2023-01-18 PROCEDURE — 84443 ASSAY THYROID STIM HORMONE: CPT | Performed by: FAMILY MEDICINE

## 2023-01-18 PROCEDURE — 83036 HEMOGLOBIN GLYCOSYLATED A1C: CPT | Performed by: FAMILY MEDICINE

## 2023-01-18 PROCEDURE — 86803 HEPATITIS C AB TEST: CPT | Performed by: FAMILY MEDICINE

## 2023-01-18 PROCEDURE — 36415 COLL VENOUS BLD VENIPUNCTURE: CPT | Performed by: FAMILY MEDICINE

## 2023-01-18 PROCEDURE — 85027 COMPLETE CBC AUTOMATED: CPT | Performed by: FAMILY MEDICINE

## 2023-01-18 RX ORDER — BUPROPION HYDROCHLORIDE 300 MG/1
TABLET ORAL
Qty: 30 TABLET | Refills: 5 | Status: SHIPPED | OUTPATIENT
Start: 2023-01-18

## 2023-01-18 RX ORDER — BUPROPION HYDROCHLORIDE 150 MG/1
TABLET ORAL
Qty: 30 TABLET | Refills: 5 | Status: SHIPPED | OUTPATIENT
Start: 2023-01-18

## 2023-01-18 SDOH — HEALTH STABILITY: PHYSICAL HEALTH: ON AVERAGE, HOW MANY MINUTES DO YOU ENGAGE IN EXERCISE AT THIS LEVEL?: 30 MIN

## 2023-01-18 SDOH — ECONOMIC STABILITY: TRANSPORTATION INSECURITY
IN THE PAST 12 MONTHS, HAS THE LACK OF TRANSPORTATION KEPT YOU FROM MEDICAL APPOINTMENTS OR FROM GETTING MEDICATIONS?: NO

## 2023-01-18 SDOH — ECONOMIC STABILITY: INCOME INSECURITY: IN THE LAST 12 MONTHS, WAS THERE A TIME WHEN YOU WERE NOT ABLE TO PAY THE MORTGAGE OR RENT ON TIME?: NO

## 2023-01-18 SDOH — HEALTH STABILITY: PHYSICAL HEALTH: ON AVERAGE, HOW MANY DAYS PER WEEK DO YOU ENGAGE IN MODERATE TO STRENUOUS EXERCISE (LIKE A BRISK WALK)?: 3 DAYS

## 2023-01-18 SDOH — ECONOMIC STABILITY: TRANSPORTATION INSECURITY
IN THE PAST 12 MONTHS, HAS LACK OF TRANSPORTATION KEPT YOU FROM MEETINGS, WORK, OR FROM GETTING THINGS NEEDED FOR DAILY LIVING?: NO

## 2023-01-18 SDOH — ECONOMIC STABILITY: INCOME INSECURITY: HOW HARD IS IT FOR YOU TO PAY FOR THE VERY BASICS LIKE FOOD, HOUSING, MEDICAL CARE, AND HEATING?: NOT HARD AT ALL

## 2023-01-18 SDOH — ECONOMIC STABILITY: FOOD INSECURITY: WITHIN THE PAST 12 MONTHS, THE FOOD YOU BOUGHT JUST DIDN'T LAST AND YOU DIDN'T HAVE MONEY TO GET MORE.: NEVER TRUE

## 2023-01-18 SDOH — ECONOMIC STABILITY: FOOD INSECURITY: WITHIN THE PAST 12 MONTHS, YOU WORRIED THAT YOUR FOOD WOULD RUN OUT BEFORE YOU GOT MONEY TO BUY MORE.: NEVER TRUE

## 2023-01-18 ASSESSMENT — ANXIETY QUESTIONNAIRES
5. BEING SO RESTLESS THAT IT IS HARD TO SIT STILL: SEVERAL DAYS
GAD7 TOTAL SCORE: 4
8. IF YOU CHECKED OFF ANY PROBLEMS, HOW DIFFICULT HAVE THESE MADE IT FOR YOU TO DO YOUR WORK, TAKE CARE OF THINGS AT HOME, OR GET ALONG WITH OTHER PEOPLE?: SOMEWHAT DIFFICULT
7. FEELING AFRAID AS IF SOMETHING AWFUL MIGHT HAPPEN: NOT AT ALL
6. BECOMING EASILY ANNOYED OR IRRITABLE: SEVERAL DAYS
IF YOU CHECKED OFF ANY PROBLEMS ON THIS QUESTIONNAIRE, HOW DIFFICULT HAVE THESE PROBLEMS MADE IT FOR YOU TO DO YOUR WORK, TAKE CARE OF THINGS AT HOME, OR GET ALONG WITH OTHER PEOPLE: SOMEWHAT DIFFICULT
7. FEELING AFRAID AS IF SOMETHING AWFUL MIGHT HAPPEN: NOT AT ALL
GAD7 TOTAL SCORE: 4
GAD7 TOTAL SCORE: 4
4. TROUBLE RELAXING: NOT AT ALL
1. FEELING NERVOUS, ANXIOUS, OR ON EDGE: SEVERAL DAYS
2. NOT BEING ABLE TO STOP OR CONTROL WORRYING: NOT AT ALL
3. WORRYING TOO MUCH ABOUT DIFFERENT THINGS: SEVERAL DAYS

## 2023-01-18 ASSESSMENT — SOCIAL DETERMINANTS OF HEALTH (SDOH)
HOW OFTEN DO YOU GET TOGETHER WITH FRIENDS OR RELATIVES?: ONCE A WEEK
DO YOU BELONG TO ANY CLUBS OR ORGANIZATIONS SUCH AS CHURCH GROUPS UNIONS, FRATERNAL OR ATHLETIC GROUPS, OR SCHOOL GROUPS?: YES
IN A TYPICAL WEEK, HOW MANY TIMES DO YOU TALK ON THE PHONE WITH FAMILY, FRIENDS, OR NEIGHBORS?: MORE THAN THREE TIMES A WEEK
HOW OFTEN DO YOU ATTEND CHURCH OR RELIGIOUS SERVICES?: MORE THAN 4 TIMES PER YEAR

## 2023-01-18 ASSESSMENT — ENCOUNTER SYMPTOMS
CONSTIPATION: 0
HEADACHES: 1
FREQUENCY: 0
CHILLS: 0
PALPITATIONS: 0
NAUSEA: 0
WEAKNESS: 1
ABDOMINAL PAIN: 0
MYALGIAS: 1
ARTHRALGIAS: 1
EYE PAIN: 1
PARESTHESIAS: 0
HEMATOCHEZIA: 0
DIZZINESS: 0
JOINT SWELLING: 1
NERVOUS/ANXIOUS: 1
HEMATURIA: 0
COUGH: 0
SHORTNESS OF BREATH: 0
HEARTBURN: 1
FEVER: 0
DIARRHEA: 0
DYSURIA: 0

## 2023-01-18 ASSESSMENT — LIFESTYLE VARIABLES
SKIP TO QUESTIONS 9-10: 0
HOW MANY STANDARD DRINKS CONTAINING ALCOHOL DO YOU HAVE ON A TYPICAL DAY: 1 OR 2
HOW OFTEN DO YOU HAVE A DRINK CONTAINING ALCOHOL: 2-4 TIMES A MONTH
AUDIT-C TOTAL SCORE: 3
HOW OFTEN DO YOU HAVE SIX OR MORE DRINKS ON ONE OCCASION: LESS THAN MONTHLY

## 2023-01-18 NOTE — PROGRESS NOTES
SUBJECTIVE:   CC: Levi is an 32 year old who presents for preventative health visit.     Here for wellness visit.    Has to have the left eye taken out, deals with pressure and pains in the eye.  THC and CBD are helpful, causing him to relapse. 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  Glaucoma has worsened significantly in the last year, progressing to what would normally take 5 years to get to.    Patient has been advised of split billing requirements and indicates understanding: Yes     Healthy Habits:     Getting at least 3 servings of Calcium per day:  NO    Bi-annual eye exam:  Yes    Dental care twice a year:  NO    Sleep apnea or symptoms of sleep apnea:  Excessive snoring    Diet:  Regular (no restrictions)    Frequency of exercise:  2-3 days/week    Duration of exercise:  30-45 minutes    Taking medications regularly:  Yes    Medication side effects:  None    PHQ-2 Total Score: 1    Additional concerns today:  No      PATIENT HEALTH QUESTIONNAIRE-9 (PHQ - 9)    Over the last 2 weeks, how often have you been bothered by any of the following problems?    1. Little interest or pleasure in doing things -  Not at all   2. Feeling down, depressed, or hopeless -  Several days   3. Trouble falling or staying asleep, or sleeping too much - Several days   4. Feeling tired or having little energy -  Several days   5. Poor appetite or overeating -  Not at all   6. Feeling bad about yourself - or that you are a failure or have let yourself or your family down -  Several days   7. Trouble concentrating on things, such as reading the newspaper or watching television - Not at all   8. Moving or speaking so slowly that other people could have noticed? Or the opposite - being so fidgety or restless that you have been moving around a lot more than usual Not at all   9. Thoughts that you would be better off dead or of hurting  yourself in some way Not at all   Total Score: 4           Today's PHQ-2  Score:   PHQ-2 (  [employer]) 2023   Q1: Little interest or pleasure in doing things 0   Q2: Feeling down, depressed or hopeless 1   PHQ-2 Score 1   PHQ-2 Total Score (12-17 Years)- Positive if 3 or more points; Administer PHQ-A if positive -   Q1: Little interest or pleasure in doing things Not at all   Q2: Feeling down, depressed or hopeless Several days   PHQ-2 Score 1     Does miss some days of medication, not more than 3 days, but has not been consistent.  Has not been using the hydroxyzine almost at all, anxiety is overall pretty good aside from the left eye.  Very tired.    Gained weight since last check, ADHD managed with Adderall but appetite is gone with the medication.      Social History     Tobacco Use     Smoking status: Former     Packs/day: 1.00     Years: 5.00     Pack years: 5.00     Types: Cigars, Cigarettes     Start date: 1998     Quit date: 2022     Years since quittin.9     Smokeless tobacco: Former     Tobacco comments:     Occasional cigar use   Substance Use Topics     Alcohol use: Yes     Comment: very rare         Alcohol Use 2023   Prescreen: >3 drinks/day or >7 drinks/week? No   Prescreen: >3 drinks/day or >7 drinks/week? -       Last PSA: No results found for: PSA    Reviewed orders with patient. Reviewed health maintenance and updated orders accordingly - Yes  Lab work is in process  Labs reviewed in EPIC  BP Readings from Last 3 Encounters:   23 104/60   22 (!) 140/66   22 (!) 134/92    Wt Readings from Last 3 Encounters:   23 97.1 kg (214 lb)   22 92.1 kg (203 lb)   22 92.1 kg (203 lb)                  Patient Active Problem List   Diagnosis     MRSA (methicillin resistant staph aureus) culture positive     H/O eye trauma     Blindness of one eye with normal vision in contralateral eye     Attention deficit hyperactivity disorder (ADHD), unspecified ADHD type     Anxiety     Acute pain of left knee     Severe episode of  recurrent major depressive disorder, without psychotic features (H)     Shoulder pain, right     Glaucoma of left eye     Past Surgical History:   Procedure Laterality Date     HC REMV CATARACT EXTRACAP,INSERT LENS, W/O ECP      Cataract r/t injury age 8     LAPAROSCOPIC APPENDECTOMY N/A 2016    Procedure: LAPAROSCOPIC APPENDECTOMY;  Surgeon: Chilo Wheeler MD;  Location: [facility] OR       Social History     Tobacco Use     Smoking status: Former     Packs/day: 1.00     Years: 5.00     Pack years: 5.00     Types: Cigars, Cigarettes     Start date: 1998     Quit date: 2022     Years since quittin.9     Smokeless tobacco: Former     Tobacco comments:     Occasional cigar use   Substance Use Topics     Alcohol use: Yes     Comment: very rare     Family History   Problem Relation Age of Onset     Cancer Father         kidney     Cancer Maternal Grandmother         lymph node     Heart Disease Paternal Grandfather      Cancer Sister         Thyroid     Thyroid Cancer Sister          Current Outpatient Medications   Medication Sig Dispense Refill     amphetamine-dextroamphetamine (ADDERALL) 30 MG tablet Take 1 tablet (30 mg) by mouth daily 60 tablet 0     buPROPion (WELLBUTRIN XL) 150 MG 24 hr tablet Take one tablet with one 300 mg tablet for a total of 450 mg daily 30 tablet 5     buPROPion (WELLBUTRIN XL) 300 MG 24 hr tablet Take one tablet with a 150 mg tablet for a total of 450mg daily. 30 tablet 5     dorzolamide-timolol (COSOPT) 2-0.5 % ophthalmic solution INSTILL 1 DROP INTO BOTH EYES TWICE A DAY       hydrOXYzine (ATARAX) 10 MG tablet Take 1 tablet (10 mg) by mouth every 8 hours as needed for anxiety 30 tablet 0     latanoprost (XALATAN) 0.005 % ophthalmic solution Place 1 drop Into the left eye 2 times daily 1 Bottle 0     nitroGLYcerin (NITRODUR) 0.1 MG/HR 24 hr patch APPLY 1 PATCH ONTO THE SKIN EVERY DAY 30 patch 1     meloxicam (MOBIC) 15 MG tablet Take 1 tablet (15 mg) by mouth daily  "for 14 days 14 tablet 0     Allergies   Allergen Reactions     Nkda [No Known Drug Allergies]      Recent Labs   Lab Test 04/26/21  1419   A1C 5.6   ALT 27   CR 0.99   GFRESTIMATED >90   GFRESTBLACK >90   POTASSIUM 3.9   TSH 1.06        Reviewed and updated as needed this visit by clinical staff   Tobacco  Allergies  Meds            Waleska Santos MD on 1/18/2023 at 2:26 PM    Reviewed and updated as needed this visit by Provider    Allergies  Meds             Past Medical History:   Diagnosis Date     History of cannabis abuse      History of cocaine abuse (H)       Past Surgical History:   Procedure Laterality Date     HC REMV CATARACT EXTRACAP,INSERT LENS, W/O ECP  1998    Cataract r/t injury age 8     LAPAROSCOPIC APPENDECTOMY N/A 2/5/2016    Procedure: LAPAROSCOPIC APPENDECTOMY;  Surgeon: Chilo Wheeler MD;  Location:  OR       Review of Systems   Constitutional: Negative for chills and fever.   HENT: Negative for congestion and hearing loss.    Eyes: Positive for pain. Negative for visual disturbance.   Respiratory: Negative for cough and shortness of breath.    Cardiovascular: Negative for chest pain, palpitations and peripheral edema.   Gastrointestinal: Positive for heartburn. Negative for abdominal pain, constipation, diarrhea, hematochezia and nausea.   Genitourinary: Negative for dysuria, frequency, genital sores, hematuria, impotence, penile discharge and urgency.   Musculoskeletal: Positive for arthralgias, joint swelling and myalgias.   Skin: Negative for rash.   Neurological: Positive for weakness and headaches. Negative for dizziness and paresthesias.   Psychiatric/Behavioral: Positive for mood changes. The patient is nervous/anxious.          OBJECTIVE:   /60 (BP Location: Right arm, Patient Position: Sitting, Cuff Size: Adult Large)   Pulse 91   Resp 14   Ht 1.765 m (5' 9.5\")   Wt 97.1 kg (214 lb)   SpO2 97%   BMI 31.15 kg/m      Physical Exam  GENERAL: healthy, " alert and no distress  EYES: Right eye reactive to light, left eye is not.  No conjunctival injection or scleral icterus noted.  HENT: normal cephalic/atraumatic, ear canals and TM's normal, nose and mouth without ulcers or lesions, oropharynx clear and oral mucous membranes moist  NECK: no adenopathy, no asymmetry, masses, or scars and thyroid normal to palpation  RESP: lungs clear to auscultation - no rales, rhonchi or wheezes  CV: regular rate and rhythm, normal S1 S2, no S3 or S4, no murmur, click or rub, no peripheral edema and peripheral pulses strong  ABDOMEN: soft, nontender, no hepatosplenomegaly, no masses and bowel sounds normal  MS: no gross musculoskeletal defects noted, no edema  SKIN: no suspicious lesions or rashes  NEURO: Normal strength and tone, mentation intact and speech normal  PSYCH: mentation appears normal, affect normal/bright    Diagnostic Test Results:  Labs reviewed in Epic    ASSESSMENT/PLAN:       ICD-10-CM    1. Routine general medical examination at a health care facility  Z00.00 Hemoglobin A1c     CBC with platelets     TSH with free T4 reflex      2. Severe episode of recurrent major depressive disorder, without psychotic features (H)  F33.2 buPROPion (WELLBUTRIN XL) 150 MG 24 hr tablet     buPROPion (WELLBUTRIN XL) 300 MG 24 hr tablet      3. Attention deficit hyperactivity disorder (ADHD), unspecified ADHD type  F90.9 -Continue with Adderall  buPROPion (WELLBUTRIN XL) 150 MG 24 hr tablet     buPROPion (WELLBUTRIN XL) 300 MG 24 hr tablet      4. Screening for human immunodeficiency virus  Z11.4 HIV Antigen Antibody Combo      5. Need for hepatitis C screening test  Z11.59 Hepatitis C Screen Reflex to HCV RNA Quant and Genotype            Patient has been advised of split billing requirements and indicates understanding: Yes      COUNSELING:   Reviewed preventive health counseling, as reflected in patient instructions      BMI:   Estimated body mass index is 31.15 kg/m  as  "calculated from the following:    Height as of this encounter: 1.765 m (5' 9.5\").    Weight as of this encounter: 97.1 kg (214 lb).         He reports that he quit smoking about a year ago. His smoking use included cigars. He started smoking about 24 years ago. He has a 5.00 pack-year smoking history. He has quit using smokeless tobacco.        Waleska Santos MD  [facility]  "

## 2023-01-19 LAB
HCV AB SERPL QL IA: NONREACTIVE
HIV 1+2 AB+HIV1 P24 AG SERPL QL IA: NONREACTIVE

## 2023-02-23 ENCOUNTER — ANESTHESIA EVENT (OUTPATIENT)
Dept: SURGERY | Facility: AMBULATORY SURGERY CENTER | Age: 33
End: 2023-02-23
Payer: COMMERCIAL

## 2023-02-23 ENCOUNTER — OFFICE VISIT (OUTPATIENT)
Dept: FAMILY MEDICINE | Facility: CLINIC | Age: 33
End: 2023-02-23
Payer: COMMERCIAL

## 2023-02-23 VITALS
RESPIRATION RATE: 20 BRPM | DIASTOLIC BLOOD PRESSURE: 73 MMHG | HEIGHT: 70 IN | SYSTOLIC BLOOD PRESSURE: 116 MMHG | HEART RATE: 97 BPM | TEMPERATURE: 97.6 F | WEIGHT: 222.9 LBS | BODY MASS INDEX: 31.91 KG/M2 | OXYGEN SATURATION: 96 %

## 2023-02-23 DIAGNOSIS — Z01.818 PREOP GENERAL PHYSICAL EXAM: Primary | ICD-10-CM

## 2023-02-23 DIAGNOSIS — H40.9 GLAUCOMA OF LEFT EYE, UNSPECIFIED GLAUCOMA TYPE: ICD-10-CM

## 2023-02-23 LAB — HGB BLD-MCNC: 16.3 G/DL (ref 13.3–17.7)

## 2023-02-23 PROCEDURE — 36415 COLL VENOUS BLD VENIPUNCTURE: CPT | Performed by: PHYSICIAN ASSISTANT

## 2023-02-23 PROCEDURE — 85018 HEMOGLOBIN: CPT | Performed by: PHYSICIAN ASSISTANT

## 2023-02-23 PROCEDURE — 99214 OFFICE O/P EST MOD 30 MIN: CPT | Performed by: PHYSICIAN ASSISTANT

## 2023-02-23 RX ORDER — ERYTHROMYCIN 5 MG/G
OINTMENT OPHTHALMIC
COMMUNITY
Start: 2023-02-17 | End: 2023-06-08

## 2023-02-23 ASSESSMENT — ANXIETY QUESTIONNAIRES
6. BECOMING EASILY ANNOYED OR IRRITABLE: SEVERAL DAYS
GAD7 TOTAL SCORE: 5
7. FEELING AFRAID AS IF SOMETHING AWFUL MIGHT HAPPEN: NOT AT ALL
8. IF YOU CHECKED OFF ANY PROBLEMS, HOW DIFFICULT HAVE THESE MADE IT FOR YOU TO DO YOUR WORK, TAKE CARE OF THINGS AT HOME, OR GET ALONG WITH OTHER PEOPLE?: SOMEWHAT DIFFICULT
5. BEING SO RESTLESS THAT IT IS HARD TO SIT STILL: SEVERAL DAYS
IF YOU CHECKED OFF ANY PROBLEMS ON THIS QUESTIONNAIRE, HOW DIFFICULT HAVE THESE PROBLEMS MADE IT FOR YOU TO DO YOUR WORK, TAKE CARE OF THINGS AT HOME, OR GET ALONG WITH OTHER PEOPLE: SOMEWHAT DIFFICULT
3. WORRYING TOO MUCH ABOUT DIFFERENT THINGS: SEVERAL DAYS
GAD7 TOTAL SCORE: 5
2. NOT BEING ABLE TO STOP OR CONTROL WORRYING: NOT AT ALL
4. TROUBLE RELAXING: SEVERAL DAYS
7. FEELING AFRAID AS IF SOMETHING AWFUL MIGHT HAPPEN: NOT AT ALL
1. FEELING NERVOUS, ANXIOUS, OR ON EDGE: SEVERAL DAYS
GAD7 TOTAL SCORE: 5

## 2023-02-23 ASSESSMENT — PATIENT HEALTH QUESTIONNAIRE - PHQ9
10. IF YOU CHECKED OFF ANY PROBLEMS, HOW DIFFICULT HAVE THESE PROBLEMS MADE IT FOR YOU TO DO YOUR WORK, TAKE CARE OF THINGS AT HOME, OR GET ALONG WITH OTHER PEOPLE: SOMEWHAT DIFFICULT
SUM OF ALL RESPONSES TO PHQ QUESTIONS 1-9: 8
SUM OF ALL RESPONSES TO PHQ QUESTIONS 1-9: 8

## 2023-02-23 NOTE — PATIENT INSTRUCTIONS
Hold adderall day . Continue to hold thc.     For informational purposes only. Not to replace the advice of your health care provider. Copyright   ,  [facility]. All rights reserved. Clinically reviewed by Nicolasa Bravo MD. [employer] [id number] - REV .  Preparing for Your Surgery  Getting started  A nurse will call you to review your health history and instructions. They will give you an arrival time based on your scheduled surgery time. Please be ready to share:  Your doctor's clinic name and phone number  Your medical, surgical, and anesthesia history  A list of allergies and sensitivities  A list of medicines, including herbal treatments and over-the-counter drugs  Whether the patient has a legal guardian (ask how to send us the papers in advance)  Please tell us if you're pregnant--or if there's any chance you might be pregnant. Some surgeries may injure a fetus (unborn baby), so they require a pregnancy test. Surgeries that are safe for a fetus don't always need a test, and you can choose whether to have one.   If you have a child who's having surgery, please ask for a copy of Preparing for Your Child's Surgery.    Preparing for surgery  Within 10 to 30 days of surgery: Have a pre-op exam (sometimes called an H&P, or History and Physical). This can be done at a clinic or pre-operative center.  If you're having a , you may not need this exam. Talk to your care team.  At your pre-op exam, talk to your care team about all medicines you take. If you need to stop any medicines before surgery, ask when to start taking them again.  We do this for your safety. Many medicines can make you bleed too much during surgery. Some change how well surgery (anesthesia) drugs work.  Call your insurance company to let them know you're having surgery. (If you don't have insurance, call [phone].)  Call your clinic if there's any change in your health. This includes signs of a cold or flu  (sore throat, runny nose, cough, rash, fever). It also includes a scrape or scratch near the surgery site.  If you have questions on the day of surgery, call your hospital or surgery center.  Eating and drinking guidelines  For your safety: Unless your surgeon tells you otherwise, follow the guidelines below.  Eat and drink as usual until 8 hours before you arrive for surgery. After that, no food or milk.  Drink clear liquids until 2 hours before you arrive. These are liquids you can see through, like water, Gatorade, and Propel Water. They also include plain black coffee and tea (no cream or milk), candy, and breath mints. You can spit out gum when you arrive.  If you drink alcohol: Stop drinking it the night before surgery.  If your care team tells you to take medicine on the morning of surgery, it's okay to take it with a sip of water.  Preventing infection  Shower or bathe the night before and morning of your surgery. Follow the instructions your clinic gave you. (If no instructions, use regular soap.)  Don't shave or clip hair near your surgery site. We'll remove the hair if needed.  Don't smoke or vape the morning of surgery. You may chew nicotine gum up to 2 hours before surgery. A nicotine patch is okay.  Note: Some surgeries require you to completely quit smoking and nicotine. Check with your surgeon.  Your care team will make every effort to keep you safe from infection. We will:  Clean our hands often with soap and water (or an alcohol-based hand rub).  Clean the skin at your surgery site with a special soap that kills germs.  Give you a special gown to keep you warm. (Cold raises the risk of infection.)  Wear special hair covers, masks, gowns and gloves during surgery.  Give antibiotic medicine, if prescribed. Not all surgeries need antibiotics.  What to bring on the day of surgery  Photo ID and insurance card  Copy of your health care directive, if you have one  Glasses and hearing aids (bring  cases)  You can't wear contacts during surgery  Inhaler and eye drops, if you use them (tell us about these when you arrive)  CPAP machine or breathing device, if you use them  A few personal items, if spending the night  If you have . . .  A pacemaker, ICD (cardiac defibrillator) or other implant: Bring the ID card.  An implanted stimulator: Bring the remote control.  A legal guardian: Bring a copy of the certified (court-stamped) guardianship papers.  Please remove any jewelry, including body piercings. Leave jewelry and other valuables at home.  If you're going home the day of surgery  You must have a responsible adult drive you home. They should stay with you overnight as well.  If you don't have someone to stay with you, and you aren't safe to go home alone, we may keep you overnight. Insurance often won't pay for this.  After surgery  If it's hard to control your pain or you need more pain medicine, please call your surgeon's office.  Questions?   If you have any questions for your care team, list them here: _________________________________________________________________________________________________________________________________________________________________________ ____________________________________ ____________________________________ ____________________________________

## 2023-02-23 NOTE — PROGRESS NOTES
[facility]  [id number] [place] [id number]  Phone: [phone]  Primary Provider: Waleska Maria  Pre-op Performing Provider: ИВАН FLORES    PREOPERATIVE EVALUATION:  Today's date: 2/23/2023    Scooby Ellington is a 32 year old male who presents for a preoperative evaluation.    Surgical Information:  Surgery/Procedure: LEFT ENUCLEATION OF EYE WITH IMPLANT  Surgery Location: [facility]  Surgeon: Jersey Gonzalez MD  Surgery Date: 2/24/2023  Time of Surgery: 9am  Where patient plans to recover: At home with family  Fax number for surgical facility: Note does not need to be faxed, will be available electronically in Epic. [phone]    Type of Anesthesia Anticipated: General    Assessment & Plan     The proposed surgical procedure is considered LOW risk.    Preop general physical exam  Stable exam.   - Hemoglobin; Future    Glaucoma of left eye, unspecified glaucoma type    - Hemoglobin; Future           Risks and Recommendations:  The patient has the following additional risks and recommendations for perioperative complications:   - No identified additional risk factors other than previously addressed    Medication Instructions:  Patient is to take all scheduled medications on the day of surgery EXCEPT for modifications listed below:  hold adderall day of surgery.      No thc until after surgery.     RECOMMENDATION:  APPROVAL GIVEN to proceed with proposed procedure, without further diagnostic evaluation.      Subjective     HPI related to upcoming procedure: history of glacoma of left eye. The above procedure was deemed the next best step in management.     Preop Questions 2/23/2023   1. Have you ever had a heart attack or stroke? No   2. Have you ever had surgery on your heart or blood vessels, such as a stent placement, a coronary artery bypass, or surgery on an artery in your head, neck, heart, or legs? No   3.  Do you have chest pain with activity? No   4. Do you have a history of  heart failure? No   5. Do you currently have a cold, bronchitis or symptoms of other infection? No   6. Do you have a cough, shortness of breath, or wheezing? YES - smokers cough    7. Do you or anyone in your family have previous history of blood clots? No   8. Do you or does anyone in your family have a serious bleeding problem such as prolonged bleeding following surgeries or cuts? No   9. Have you ever had problems with anemia or been told to take iron pills? No   10. Have you had any abnormal blood loss such as black, tarry or bloody stools? No   11. Have you ever had a blood transfusion? No   12. Are you willing to have a blood transfusion if it is medically needed before, during, or after your surgery? Yes   13. Have you or any of your relatives ever had problems with anesthesia? No   14. Do you have sleep apnea, excessive snoring or daytime drowsiness? No   15. Do you have any artifical heart valves or other implanted medical devices like a pacemaker, defibrillator, or continuous glucose monitor? No   16. Do you have artificial joints? No   17. Are you allergic to latex? No     Health Care Directive:  Patient does not have a Health Care Directive or Living Will: Discussed advance care planning with patient; information given to patient to review.    Preoperative Review of :   reviewed - controlled substances reflected in medication list.    Status of Chronic Conditions:  See problem list for active medical problems.  Problems all longstanding and stable, except as noted/documented.  See ROS for pertinent symptoms related to these conditions.    History of anxiety. Controlled on current regimen. No symptoms.     Review of Systems: other than above.   CONSTITUTIONAL: NEGATIVE for fever, chills, change in weight  INTEGUMENTARY/SKIN: NEGATIVE for worrisome rashes, moles or lesions  EYES: NEGATIVE for vision changes or  irritation  ENT/MOUTH: NEGATIVE for ear, mouth and throat problems  RESP: NEGATIVE for significant cough or SOB  CV: NEGATIVE for chest pain, palpitations or peripheral edema  GI: NEGATIVE for nausea, abdominal pain, heartburn, or change in bowel habits  : NEGATIVE for frequency, dysuria, or hematuria  MUSCULOSKELETAL: NEGATIVE for significant arthralgias or myalgia  NEURO: NEGATIVE for weakness, dizziness or paresthesias  ENDOCRINE: NEGATIVE for temperature intolerance, skin/hair changes  HEME: NEGATIVE for bleeding problems  PSYCHIATRIC: NEGATIVE for changes in mood or affect    Patient Active Problem List    Diagnosis Date Noted     Glaucoma of left eye 01/18/2023     Priority: Medium     Shoulder pain, right 07/28/2022     Priority: Medium     Severe episode of recurrent major depressive disorder, without psychotic features (H) 06/07/2022     Priority: Medium     Acute pain of left knee 06/01/2022     Priority: Medium     Attention deficit hyperactivity disorder (ADHD), unspecified ADHD type 04/26/2021     Priority: Medium     Anxiety 04/26/2021     Priority: Medium     H/O eye trauma 01/20/2014     Priority: Medium     Blindness of one eye with normal vision in contralateral eye 04/11/2013     Priority: Medium     MRSA (methicillin resistant staph aureus) culture positive 08/17/2010     Priority: Medium     R Hip        Past Medical History:   Diagnosis Date     Gastroesophageal reflux disease      History of cannabis abuse      History of cocaine abuse (H)      Past Surgical History:   Procedure Laterality Date     HC REMV CATARACT EXTRACAP,INSERT LENS, W/O ECP  1998    Cataract r/t injury age 8     LAPAROSCOPIC APPENDECTOMY N/A 2/5/2016    Procedure: LAPAROSCOPIC APPENDECTOMY;  Surgeon: Chilo Wheeler MD;  Location:  OR     Current Outpatient Medications   Medication Sig Dispense Refill     amphetamine-dextroamphetamine (ADDERALL) 30 MG tablet Take 1 tablet (30 mg) by mouth daily 60 tablet 0      "buPROPion (WELLBUTRIN XL) 150 MG 24 hr tablet Take one tablet with one 300 mg tablet for a total of 450 mg daily 30 tablet 5     buPROPion (WELLBUTRIN XL) 300 MG 24 hr tablet Take one tablet with a 150 mg tablet for a total of 450mg daily. 30 tablet 5     dorzolamide-timolol (COSOPT) 2-0.5 % ophthalmic solution INSTILL 1 DROP INTO BOTH EYES TWICE A DAY       erythromycin (ROMYCIN) 5 MG/GM ophthalmic ointment APPLY SMALL AMOUNT TO EYELIDS 4 TIMES DAILY FOR 1 WEEK       hydrOXYzine (ATARAX) 10 MG tablet Take 1 tablet (10 mg) by mouth every 8 hours as needed for anxiety 30 tablet 0     latanoprost (XALATAN) 0.005 % ophthalmic solution Place 1 drop Into the left eye 2 times daily 1 Bottle 0     nitroGLYcerin (NITRODUR) 0.1 MG/HR 24 hr patch APPLY 1 PATCH ONTO THE SKIN EVERY DAY 30 patch 1       Allergies   Allergen Reactions     Nkda [No Known Drug Allergies]         Social History     Tobacco Use     Smoking status: Former     Packs/day: 1.00     Years: 5.00     Pack years: 5.00     Types: Cigars, Cigarettes     Start date: 1998     Quit date: 2022     Years since quittin.0     Smokeless tobacco: Former     Tobacco comments:     Occasional cigar use   Substance Use Topics     Alcohol use: Yes     Comment: very rare     Family History   Problem Relation Age of Onset     Cancer Father         kidney     Cancer Maternal Grandmother         lymph node     Heart Disease Paternal Grandfather      Cancer Sister         Thyroid     Thyroid Cancer Sister      History   Drug Use     Types: Marijuana         Objective     /73 (BP Location: Left arm, Patient Position: Sitting, Cuff Size: Adult Regular)   Pulse 97   Temp 97.6  F (36.4  C) (Temporal)   Resp 20   Ht 1.765 m (5' 9.5\")   Wt 101.1 kg (222 lb 14.4 oz)   SpO2 96%   BMI 32.44 kg/m      Physical Exam    GENERAL APPEARANCE: healthy, alert and no distress     EYES: EOMI,  PERRL     HENT: ear canals and TM's normal and nose and mouth without ulcers or " lesions     NECK: no adenopathy, no asymmetry, masses, or scars and thyroid normal to palpation     RESP: lungs clear to auscultation - no rales, rhonchi or wheezes     CV: regular rates and rhythm, normal S1 S2, no S3 or S4 and no murmur, click or rub     ABDOMEN:  soft, nontender, no HSM or masses and bowel sounds normal     MS: extremities normal- no gross deformities noted, no evidence of inflammation in joints, FROM in all extremities.     SKIN: no suspicious lesions or rashes     NEURO: Normal strength and tone, sensory exam grossly normal, mentation intact and speech normal     PSYCH: mentation appears normal. and affect normal/bright     LYMPHATICS: No cervical adenopathy    Recent Labs   Lab Test 01/18/23  1501 04/26/21  1419   HGB 15.6 16.5    195   NA  --  138   POTASSIUM  --  3.9   CR  --  0.99   A1C 6.0* 5.6        Diagnostics:  Recent Results (from the past 24 hour(s))   Hemoglobin    Collection Time: 02/23/23 11:25 AM   Result Value Ref Range    Hemoglobin 16.3 13.3 - 17.7 g/dL      No EKG required for low risk surgery (cataract, skin procedure, breast biopsy, etc).    Revised Cardiac Risk Index (RCRI):  The patient has the following serious cardiovascular risks for perioperative complications:   - No serious cardiac risks = 0 points     RCRI Interpretation: 0 points: Class I (very low risk - 0.4% complication rate)           Signed Electronically by: Jamal King PA-C  Copy of this evaluation report is provided to requesting physician.      Answers for HPI/ROS submitted by the patient on 2/23/2023  If you checked off any problems, how difficult have these problems made it for you to do your work, take care of things at home, or get along with other people?: Somewhat difficult  PHQ9 TOTAL SCORE: 8  ISABEL 7 TOTAL SCORE: 5

## 2023-02-24 ENCOUNTER — HOSPITAL ENCOUNTER (OUTPATIENT)
Facility: AMBULATORY SURGERY CENTER | Age: 33
Discharge: HOME | End: 2023-02-24
Attending: OPHTHALMOLOGY
Payer: COMMERCIAL

## 2023-02-24 ENCOUNTER — ANESTHESIA (OUTPATIENT)
Dept: SURGERY | Facility: AMBULATORY SURGERY CENTER | Age: 33
End: 2023-02-24
Payer: COMMERCIAL

## 2023-02-24 VITALS
OXYGEN SATURATION: 99 % | WEIGHT: 222 LBS | HEART RATE: 52 BPM | RESPIRATION RATE: 16 BRPM | DIASTOLIC BLOOD PRESSURE: 59 MMHG | HEIGHT: 70 IN | BODY MASS INDEX: 31.78 KG/M2 | TEMPERATURE: 96.7 F | SYSTOLIC BLOOD PRESSURE: 112 MMHG

## 2023-02-24 DIAGNOSIS — H35.039 BLIND HYPERTENSIVE EYE: ICD-10-CM

## 2023-02-24 LAB — GLUCOSE BY METER: 118

## 2023-02-24 DEVICE — AN OPHTHALMIC DEVICE, TYPICALLY CONSTRUCTED FROM MOULDED PLASTIC, THAT IS INTENDED TO BE INSERTED TEMPORARILY BETWEEN THE EYEBALL AND EYELID TO MAINTAIN SPACE IN THE ORBITAL CAVITY AND PREVENT CLOSURE OR ADHESIONS DURING THE HEALING PROCESS FOLLOWING EYE SURGERY. THIS IS A SINGLE-USE DEVICE.
Type: IMPLANTABLE DEVICE | Site: EYE | Status: FUNCTIONAL
Brand: OPHTHALMIC CONFORMER

## 2023-02-24 RX ORDER — FENTANYL CITRATE 0.05 MG/ML
25 INJECTION, SOLUTION INTRAMUSCULAR; INTRAVENOUS EVERY 5 MIN PRN
Status: DISCONTINUED | OUTPATIENT
Start: 2023-02-24 | End: 2023-02-25 | Stop reason: HOSPADM

## 2023-02-24 RX ORDER — ONDANSETRON 2 MG/ML
4 INJECTION INTRAMUSCULAR; INTRAVENOUS EVERY 30 MIN PRN
Status: DISCONTINUED | OUTPATIENT
Start: 2023-02-24 | End: 2023-02-25 | Stop reason: HOSPADM

## 2023-02-24 RX ORDER — FENTANYL CITRATE 50 UG/ML
INJECTION, SOLUTION INTRAMUSCULAR; INTRAVENOUS PRN
Status: DISCONTINUED | OUTPATIENT
Start: 2023-02-24 | End: 2023-02-24

## 2023-02-24 RX ORDER — FENTANYL CITRATE 0.05 MG/ML
50 INJECTION, SOLUTION INTRAMUSCULAR; INTRAVENOUS EVERY 5 MIN PRN
Status: DISCONTINUED | OUTPATIENT
Start: 2023-02-24 | End: 2023-02-25 | Stop reason: HOSPADM

## 2023-02-24 RX ORDER — PROPOFOL 10 MG/ML
INJECTION, EMULSION INTRAVENOUS PRN
Status: DISCONTINUED | OUTPATIENT
Start: 2023-02-24 | End: 2023-02-24

## 2023-02-24 RX ORDER — LIDOCAINE HYDROCHLORIDE 20 MG/ML
INJECTION, SOLUTION INFILTRATION; PERINEURAL PRN
Status: DISCONTINUED | OUTPATIENT
Start: 2023-02-24 | End: 2023-02-24

## 2023-02-24 RX ORDER — ATROPINE SULFATE 0.4 MG/ML
0.4 AMPUL (ML) INJECTION ONCE
Status: COMPLETED | OUTPATIENT
Start: 2023-02-24 | End: 2023-02-24

## 2023-02-24 RX ORDER — SODIUM CHLORIDE, SODIUM LACTATE, POTASSIUM CHLORIDE, CALCIUM CHLORIDE 600; 310; 30; 20 MG/100ML; MG/100ML; MG/100ML; MG/100ML
INJECTION, SOLUTION INTRAVENOUS CONTINUOUS
Status: DISCONTINUED | OUTPATIENT
Start: 2023-02-24 | End: 2023-02-25 | Stop reason: HOSPADM

## 2023-02-24 RX ORDER — HALOPERIDOL 5 MG/ML
1 INJECTION INTRAMUSCULAR ONCE
Status: COMPLETED | OUTPATIENT
Start: 2023-02-24 | End: 2023-02-24

## 2023-02-24 RX ORDER — ONDANSETRON 2 MG/ML
INJECTION INTRAMUSCULAR; INTRAVENOUS PRN
Status: DISCONTINUED | OUTPATIENT
Start: 2023-02-24 | End: 2023-02-24

## 2023-02-24 RX ORDER — HYDROMORPHONE HCL IN WATER/PF 6 MG/30 ML
0.2 PATIENT CONTROLLED ANALGESIA SYRINGE INTRAVENOUS EVERY 5 MIN PRN
Status: DISCONTINUED | OUTPATIENT
Start: 2023-02-24 | End: 2023-02-25 | Stop reason: HOSPADM

## 2023-02-24 RX ORDER — HYDROMORPHONE HCL IN WATER/PF 6 MG/30 ML
0.4 PATIENT CONTROLLED ANALGESIA SYRINGE INTRAVENOUS EVERY 5 MIN PRN
Status: DISCONTINUED | OUTPATIENT
Start: 2023-02-24 | End: 2023-02-25 | Stop reason: HOSPADM

## 2023-02-24 RX ORDER — OXYCODONE HYDROCHLORIDE 5 MG/1
5 TABLET ORAL EVERY 4 HOURS PRN
Status: DISCONTINUED | OUTPATIENT
Start: 2023-02-24 | End: 2023-02-25 | Stop reason: HOSPADM

## 2023-02-24 RX ORDER — PROPOFOL 10 MG/ML
INJECTION, EMULSION INTRAVENOUS CONTINUOUS PRN
Status: DISCONTINUED | OUTPATIENT
Start: 2023-02-24 | End: 2023-02-24

## 2023-02-24 RX ORDER — LIDOCAINE 40 MG/G
CREAM TOPICAL
Status: DISCONTINUED | OUTPATIENT
Start: 2023-02-24 | End: 2023-02-25 | Stop reason: HOSPADM

## 2023-02-24 RX ORDER — DEXAMETHASONE SODIUM PHOSPHATE 4 MG/ML
INJECTION, SOLUTION INTRA-ARTICULAR; INTRALESIONAL; INTRAMUSCULAR; INTRAVENOUS; SOFT TISSUE PRN
Status: DISCONTINUED | OUTPATIENT
Start: 2023-02-24 | End: 2023-02-24

## 2023-02-24 RX ORDER — ONDANSETRON 4 MG/1
4 TABLET, ORALLY DISINTEGRATING ORAL EVERY 30 MIN PRN
Status: DISCONTINUED | OUTPATIENT
Start: 2023-02-24 | End: 2023-02-25 | Stop reason: HOSPADM

## 2023-02-24 RX ADMIN — HALOPERIDOL 1 MG: 5 INJECTION INTRAMUSCULAR at 13:06

## 2023-02-24 RX ADMIN — LIDOCAINE HYDROCHLORIDE 20 ML: 20 INJECTION, SOLUTION INFILTRATION; PERINEURAL at 09:08

## 2023-02-24 RX ADMIN — FENTANYL CITRATE 50 MCG: 50 INJECTION, SOLUTION INTRAMUSCULAR; INTRAVENOUS at 09:21

## 2023-02-24 RX ADMIN — FENTANYL CITRATE 50 MCG: 0.05 INJECTION, SOLUTION INTRAMUSCULAR; INTRAVENOUS at 10:43

## 2023-02-24 RX ADMIN — SODIUM CHLORIDE, SODIUM LACTATE, POTASSIUM CHLORIDE, CALCIUM CHLORIDE: 600; 310; 30; 20 INJECTION, SOLUTION INTRAVENOUS at 10:44

## 2023-02-24 RX ADMIN — Medication 0.4 MG: at 11:04

## 2023-02-24 RX ADMIN — SODIUM CHLORIDE, SODIUM LACTATE, POTASSIUM CHLORIDE, CALCIUM CHLORIDE: 600; 310; 30; 20 INJECTION, SOLUTION INTRAVENOUS at 08:44

## 2023-02-24 RX ADMIN — FENTANYL CITRATE 50 MCG: 0.05 INJECTION, SOLUTION INTRAMUSCULAR; INTRAVENOUS at 10:38

## 2023-02-24 RX ADMIN — FENTANYL CITRATE 50 MCG: 50 INJECTION, SOLUTION INTRAMUSCULAR; INTRAVENOUS at 09:08

## 2023-02-24 RX ADMIN — Medication 50 MG: at 09:08

## 2023-02-24 RX ADMIN — ONDANSETRON 4 MG: 2 INJECTION INTRAMUSCULAR; INTRAVENOUS at 09:24

## 2023-02-24 RX ADMIN — ONDANSETRON 4 MG: 2 INJECTION INTRAMUSCULAR; INTRAVENOUS at 10:42

## 2023-02-24 RX ADMIN — PROPOFOL 100 MCG/KG/MIN: 10 INJECTION, EMULSION INTRAVENOUS at 09:12

## 2023-02-24 RX ADMIN — OXYCODONE HYDROCHLORIDE 5 MG: 5 TABLET ORAL at 13:37

## 2023-02-24 RX ADMIN — DEXAMETHASONE SODIUM PHOSPHATE 8 MG: 4 INJECTION, SOLUTION INTRA-ARTICULAR; INTRALESIONAL; INTRAMUSCULAR; INTRAVENOUS; SOFT TISSUE at 09:24

## 2023-02-24 RX ADMIN — PROPOFOL 200 MG: 10 INJECTION, EMULSION INTRAVENOUS at 09:08

## 2023-02-24 NOTE — BRIEF OP NOTE
Brief Operative Note    Pre-operative diagnosis: Blind hypertensive eye [H35.039]   Post-operative diagnosis * No post-op diagnosis entered *  Same   Procedure: Procedure(s):  LEFT ENUCLEATION OF EYE WITH IMPLANT   Surgeon(s): Surgeon(s) and Role:     * Jersey Gonzalez MD - Primary   Estimated blood loss: * No values recorded between 2/24/2023  9:20 AM and 2/24/2023 10:01 AM *    Specimens: ID Type Source Tests Collected by Time Destination   1 :  Tissue Eye, Left SURGICAL PATHOLOGY EXAM Jersey Gonzalez MD 2/24/2023 0934       Findings: See dictation

## 2023-02-24 NOTE — ANESTHESIA PROCEDURE NOTES
Airway       Patient location during procedure: OR       Procedure Start/Stop Times: 2/24/2023 9:12 AM  Staff -        Anesthesiologist:  Tania Osborne MD       Performed By: anesthesiologist  Consent for Airway        Urgency: elective  Indications and Patient Condition       Indications for airway management: paul-procedural       Induction type:intravenous       Mask difficulty assessment: 1 - vent by mask    Final Airway Details       Final airway type: endotracheal airway       Successful airway: ETT - single  Endotracheal Airway Details        ETT size (mm): 7.5       Cuffed: yes       Cuff volume (mL): 5       Successful intubation technique: direct laryngoscopy       DL Blade Type: Cedillo 2       Grade View of Cords: 1       Adjucts: stylet       Position: Center       Measured from: gums/teeth       Secured at (cm): 24       Bite block used: Soft    Post intubation assessment        Placement verified by: capnometry, equal breath sounds and chest rise        Number of attempts at approach: 1       Number of other approaches attempted: 0       Secured with: silk tape       Ease of procedure: easy       Dentition: Intact       Dental guard used and removed.    Medication(s) Administered   Medication Administration Time: 2/24/2023 9:12 AM

## 2023-02-24 NOTE — PROGRESS NOTES
Patient had persistent nausea in Phase II of recovery.  Discussed options with [facility] and Haldol was given (see MAR).  Patient reported improvement in nausea and was given oral pain medication.  He and his wife requested zofran for home.  Called Dr. Gonzalez and he states he will send it to the [facility] For patient to pick-up.    Patient stated readiness to be discharged to home.  All discharge instructions were discussed with patient and wife and all questions were answered.    Linda Fields RN on 2/24/2023 at 3:02 PM

## 2023-02-24 NOTE — ANESTHESIA PREPROCEDURE EVALUATION
Anesthesia Pre-Procedure Evaluation    Patient: Scooby Ellington   MRN: [id number] : 1990        Procedure : Procedure(s):  LEFT ENUCLEATION OF EYE WITH IMPLANT          Past Medical History:   Diagnosis Date     Gastroesophageal reflux disease      History of cannabis abuse      History of cocaine abuse (H)       Past Surgical History:   Procedure Laterality Date     HC REMV CATARACT EXTRACAP,INSERT LENS, W/O ECP      Cataract r/t injury age 8     LAPAROSCOPIC APPENDECTOMY N/A 2016    Procedure: LAPAROSCOPIC APPENDECTOMY;  Surgeon: Chilo Wheeler MD;  Location: [facility] OR      Allergies   Allergen Reactions     Nkda [No Known Drug Allergies]       Social History     Tobacco Use     Smoking status: Former     Packs/day: 1.00     Years: 5.00     Pack years: 5.00     Types: Cigars, Cigarettes     Start date: 1998     Quit date: 2022     Years since quittin.0     Smokeless tobacco: Former     Tobacco comments:     Occasional cigar use   Substance Use Topics     Alcohol use: Yes     Comment: very rare      Wt Readings from Last 1 Encounters:   23 100.7 kg (222 lb)        Anesthesia Evaluation   Pt has had prior anesthetic. Type: General.    No history of anesthetic complications       ROS/MED HX  ENT/Pulmonary:  - neg pulmonary ROS     Neurologic:  - neg neurologic ROS     Cardiovascular:  - neg cardiovascular ROS     METS/Exercise Tolerance:     Hematologic:       Musculoskeletal:  - neg musculoskeletal ROS     GI/Hepatic:     (+) GERD, Asymptomatic on medication,     Renal/Genitourinary:  - neg Renal ROS     Endo:     (+) Obesity,     Psychiatric/Substance Use:       Infectious Disease:       Malignancy:       Other:            Physical Exam    Airway        Mallampati: II   TM distance: > 3 FB   Neck ROM: full     Respiratory Devices and Support         Dental           Cardiovascular   cardiovascular exam normal          Pulmonary   pulmonary exam normal                 OUTSIDE LABS:  CBC:   Lab Results   Component Value Date    WBC 8.9 01/18/2023    WBC 10.1 04/26/2021    HGB 16.3 02/23/2023    HGB 15.6 01/18/2023    HCT 46.4 01/18/2023    HCT 46.3 04/26/2021     01/18/2023     04/26/2021     BMP:   Lab Results   Component Value Date     04/26/2021     01/20/2014    POTASSIUM 3.9 04/26/2021    POTASSIUM 4.6 01/20/2014    CHLORIDE 107 04/26/2021    CHLORIDE 102 01/20/2014    CO2 29 04/26/2021    CO2 26 01/20/2014    BUN 14 04/26/2021    BUN 19 01/20/2014    CR 0.99 04/26/2021    CR 0.83 01/20/2014    GLC 96 04/26/2021    GLC 92 01/20/2014     COAGS: No results found for: PTT, INR, FIBR  POC: No results found for: BGM, HCG, HCGS  HEPATIC:   Lab Results   Component Value Date    ALBUMIN 4.2 04/26/2021    PROTTOTAL 7.4 04/26/2021    ALT 27 04/26/2021    AST 20 04/26/2021    ALKPHOS 68 04/26/2021    BILITOTAL 0.6 04/26/2021     OTHER:   Lab Results   Component Value Date    A1C 6.0 (H) 01/18/2023    ENZO 9.1 04/26/2021    TSH 1.29 01/18/2023       Anesthesia Plan    ASA Status:  2   NPO Status:  NPO Appropriate    Anesthesia Type: General.     - Airway: ETT   Induction: Intravenous.   Maintenance: TIVA.        Consents    Anesthesia Plan(s) and associated risks, benefits, and realistic alternatives discussed. Questions answered and patient/representative(s) expressed understanding.     - Discussed: Risks, Benefits and Alternatives for BOTH SEDATION and the PROCEDURE were discussed     - Discussed with:  Patient         Postoperative Care    Pain management: IV analgesics.   PONV prophylaxis: Ondansetron (or other 5HT-3), Dexamethasone or Solumedrol     Comments:                Tania Osborne MD

## 2023-02-24 NOTE — ANESTHESIA CARE TRANSFER NOTE
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    Anesthesia Type:   General     Note:    Oropharynx: oropharynx clear of all foreign objects  Level of Consciousness: drowsy  Oxygen Supplementation: face mask    Independent Airway: airway patency satisfactory and stable  Dentition: dentition unchanged  Vital Signs Stable: post-procedure vital signs reviewed and stable  Report to RN Given: handoff report given  Patient transferred to: PACU    Handoff Report: Identifed the Patient, Identified the Reponsible Provider, Reviewed the pertinent medical history, Discussed the surgical course, Reviewed Intra-OP anesthesia mangement and issues during anesthesia, Set expectations for post-procedure period and Allowed opportunity for questions and acknowledgement of understanding      Vitals:  Vitals Value Taken Time   /64 02/24/23 1052   Temp 96.4  F (35.8  C) 02/24/23 1043   Pulse 41 02/24/23 1056   Resp 14 02/24/23 1050   SpO2 96 % 02/24/23 1056   Vitals shown include unvalidated device data.    Electronically Signed By: Ernie Nelson MD  February 24, 2023  10:58 AM

## 2023-02-24 NOTE — OP NOTE
PREOPERATIVE DIAGNOSES:   1. Left blind painful eye.     POSTOPERATIVE DIAGNOSES:   1. Left blind painful eye.     PROCEDURES: Left eye enucleation with 20 mm silicone implant.   ANESTHESIA: General.   ESTIMATED BLOOD LOSS: 5 cc.   SPECIMEN SENT: Left globe.   COMPLICATIONS: No complications occurred.   IMPLANT: 20 mm silicone assisted ophthalmic orbital implant.   INDICATIONS FOR SURGERY: The patient presented with a history of ocular disease in the surgical eye. As a result, the eye is blind and painful eye on that side. We discussed the risks, benefits and alternatives of the aforementioned procedure, and he wished to proceed.   PROCEDURE: The patient was brought to the operating room, where general endotracheal anesthesia was induced. The eye was prepped and draped in the usual sterile ophthalmic fashion. A Viola scissors was then used to construct a 360-degree peritomy at the limbus. Hernandez scissors were used to dissect the Tenon's fascia in all 4 quadrants. Beginning with the medial rectus, muscle hooks were then used to isolate the muscle, and the medial rectus was imbricated with a 6-0 double-armed Vicryl suture. The muscle was then released from its insertion on the globe. The lateral rectus was imbricated and released in a similar fashion. The inferior and superior rectus were then isolated and released from the globe without imbrication. The superior oblique and inferior oblique were also released from the globe. A 4-0 silk suture was then placed through the medial muscle insertion for traction, and then the globe was pulled in an upward fashion. Enucleation scissors were passed medially, used to strum the optic nerve and then the optic nerve was cut with the scissors. All remaining adhesions were released, and the globe was then removed from the eye socket. 4x4 sponges were used to maintain pressure and minimize bleeding in the orbit. The globe was briefly inspected and found to be intact.   A 20 mm  silicone implant was then placed into the orbit. 5-0 Vicryl suture was used to close the posterior and anterior Tenon's fascia. Both imbricated muscles were then attached to the conjunctiva anteriorly. A running 6-0 plain gut suture was then used to close the overlying conjunctiva. A medium-sized conformer was placed underneath the lids, followed by erythromycin ointment. A retrobulbar block consisting of 2% lidocaine was placed with an approximate volume of 2 cc. A pressure dressing was placed over the eye, and the patient was then discharged from the operating room in stable condition.

## 2023-02-24 NOTE — ANESTHESIA POSTPROCEDURE EVALUATION
Patient: Scooby Ellington    Procedure: Procedure(s):  LEFT ENUCLEATION OF EYE WITH IMPLANT       Anesthesia Type:  General    Note:  Disposition: Outpatient   Postop Pain Control: Uneventful            Sign Out: Well controlled pain   PONV: Yes            Symptoms: Nausea only            Sign Out: PONV/POV resolved with treatment   Neuro/Psych: Uneventful            Sign Out: Acceptable/Baseline neuro status   Airway/Respiratory: Uneventful            Sign Out: Acceptable/Baseline resp. status   CV/Hemodynamics: Uneventful            Sign Out: Acceptable CV status; No obvious hypovolemia; No obvious fluid overload   Other NRE: NONE   DID A NON-ROUTINE EVENT OCCUR? No           Last vitals:  Vitals Value Taken Time   /61 02/24/23 1115   Temp 96.4  F (35.8  C) 02/24/23 1043   Pulse 53 02/24/23 1117   Resp 14 02/24/23 1115   SpO2 96 % 02/24/23 1117   Vitals shown include unvalidated device data.    Electronically Signed By: Garcia Oseguera MD  February 24, 2023  2:10 PM

## 2023-06-08 ENCOUNTER — OFFICE VISIT (OUTPATIENT)
Dept: FAMILY MEDICINE | Facility: CLINIC | Age: 33
End: 2023-06-08
Payer: COMMERCIAL

## 2023-06-08 VITALS
RESPIRATION RATE: 18 BRPM | DIASTOLIC BLOOD PRESSURE: 68 MMHG | HEIGHT: 69 IN | BODY MASS INDEX: 30.26 KG/M2 | WEIGHT: 204.3 LBS | TEMPERATURE: 98.6 F | OXYGEN SATURATION: 98 % | SYSTOLIC BLOOD PRESSURE: 134 MMHG | HEART RATE: 90 BPM

## 2023-06-08 DIAGNOSIS — K62.5 RECTAL BLEEDING: Primary | ICD-10-CM

## 2023-06-08 PROCEDURE — 99214 OFFICE O/P EST MOD 30 MIN: CPT | Performed by: PHYSICIAN ASSISTANT

## 2023-06-08 RX ORDER — HYDROCORTISONE ACETATE 25 MG/1
25 SUPPOSITORY RECTAL 2 TIMES DAILY
Qty: 12 SUPPOSITORY | Refills: 0 | Status: SHIPPED | OUTPATIENT
Start: 2023-06-08

## 2023-06-08 ASSESSMENT — ANXIETY QUESTIONNAIRES
GAD7 TOTAL SCORE: 4
8. IF YOU CHECKED OFF ANY PROBLEMS, HOW DIFFICULT HAVE THESE MADE IT FOR YOU TO DO YOUR WORK, TAKE CARE OF THINGS AT HOME, OR GET ALONG WITH OTHER PEOPLE?: SOMEWHAT DIFFICULT
5. BEING SO RESTLESS THAT IT IS HARD TO SIT STILL: SEVERAL DAYS
7. FEELING AFRAID AS IF SOMETHING AWFUL MIGHT HAPPEN: NOT AT ALL
1. FEELING NERVOUS, ANXIOUS, OR ON EDGE: SEVERAL DAYS
6. BECOMING EASILY ANNOYED OR IRRITABLE: SEVERAL DAYS
IF YOU CHECKED OFF ANY PROBLEMS ON THIS QUESTIONNAIRE, HOW DIFFICULT HAVE THESE PROBLEMS MADE IT FOR YOU TO DO YOUR WORK, TAKE CARE OF THINGS AT HOME, OR GET ALONG WITH OTHER PEOPLE: SOMEWHAT DIFFICULT
2. NOT BEING ABLE TO STOP OR CONTROL WORRYING: NOT AT ALL
3. WORRYING TOO MUCH ABOUT DIFFERENT THINGS: SEVERAL DAYS
GAD7 TOTAL SCORE: 4
7. FEELING AFRAID AS IF SOMETHING AWFUL MIGHT HAPPEN: NOT AT ALL
GAD7 TOTAL SCORE: 4
4. TROUBLE RELAXING: NOT AT ALL

## 2023-06-08 ASSESSMENT — PAIN SCALES - GENERAL: PAINLEVEL: NO PAIN (0)

## 2023-06-08 ASSESSMENT — PATIENT HEALTH QUESTIONNAIRE - PHQ9
10. IF YOU CHECKED OFF ANY PROBLEMS, HOW DIFFICULT HAVE THESE PROBLEMS MADE IT FOR YOU TO DO YOUR WORK, TAKE CARE OF THINGS AT HOME, OR GET ALONG WITH OTHER PEOPLE: SOMEWHAT DIFFICULT
SUM OF ALL RESPONSES TO PHQ QUESTIONS 1-9: 4
SUM OF ALL RESPONSES TO PHQ QUESTIONS 1-9: 4

## 2023-06-08 NOTE — PATIENT INSTRUCTIONS
Cbc today    Suspect from internal  hemorrhoids.   Anusol suppositories    Stool softener like colace (doscuate sodium) - can take twice daily  Good fiber and water in diet.     Avoid aspirin and NSAID medications.    To ER for bleeding like we discussed - more than 1 episode . Larger amounts of  bleeding. Bleeding with pain, dizziness.     Referral for colonoscopy

## 2023-06-08 NOTE — LETTER
June 8, 2023      Scooby Ellington  [address] [facility] [address]        To Whom It May Concern:    Scooby Ellington  was seen on 06/08/2023.  Please excuse him from work today 06/08/0223 due to medical appointment and  medical condition.        Sincerely,        Daphne Browning PA-C

## 2023-06-08 NOTE — PROGRESS NOTES
"  Assessment & Plan     Rectal bleeding  Hx of bleeding which he feels are from hemorrhoids. Always triggered by straining.   Bleeding painless and no ext hemorrhoids on exam.   Discussed internal vs external hemorrhoids as well as other possible causes of lower GI bleed.   Bleeding sounds small amt by description.   Has not had BM today yet.   Normal exam today.   Anusol to treat suspected internal hemorrhoids  Stool softeners and constipation management  CBC pending.  S/sx for ER eval.  Sx present for months. Did place order for scope.  - Adult GI  Referral - Procedure Only; Future  - CBC with platelets and differential; Future  - hydrocortisone (ANUSOL-HC) 25 MG suppository; Place 1 suppository (25 mg) rectally 2 times daily  - CBC with platelets and differential     Nicotine/Tobacco Cessation:  He reports that he has been smoking cigars and cigarettes. He started smoking about 25 years ago. He has a 5.00 pack-year smoking history. He has quit using smokeless tobacco.  Nicotine/Tobacco Cessation Plan:   Information offered: Patient not interested at this time      BMI:   Estimated body mass index is 29.75 kg/m  as calculated from the following:    Height as of this encounter: 1.765 m (5' 9.49\").    Weight as of this encounter: 92.7 kg (204 lb 4.8 oz).   Weight management plan: Patient was referred to their PCP to discuss a diet and exercise plan.    Follow Up: The patient was instructed to contact clinic for worsening symptoms, non-improvement in time frame as expected/discussed, and for questions regarding medications or treatment plan. For virtual visits, the patient was advised to be seen for in person evaluation if symptoms or condition are worsening or non-improvement as expected.       Daphne Browning PA-C  [facility] JOHN Sims is a 32 year old, presenting for the following health issues:  Bloody stool        6/8/2023     9:58 AM   Additional Questions   Roomed by " "YK   Accompanied by self     History of Present Illness       Reason for visit:  Bloody stools  Symptom onset:  1-3 days ago  Symptoms include:  Blood in my stools. looks kind of like blood clots  Symptom intensity:  Mild  Symptom progression:  Worsening  Had these symptoms before:  No    He eats 0-1 servings of fruits and vegetables daily.He consumes 5 sweetened beverage(s) daily.He exercises with enough effort to increase his heart rate 60 or more minutes per day.  He exercises with enough effort to increase his heart rate 7 days per week. He is missing 3 dose(s) of medications per week.  He is not taking prescribed medications regularly due to side effects and remembering to take.    Today's PHQ-9         PHQ-9 Total Score: 4    PHQ-9 Q9 Thoughts of better off dead/self-harm past 2 weeks :   Not at all    How difficult have these problems made it for you to do your work, take care of things at home, or get along with other people: Somewhat difficult  Today's ISABEL-7 Score: 4     Rectal bleeding-   Started a few days ago-  \"I have a hemorrhoid right now I can feel it.\"    He notes the hemorrhoids flare when he pushes/strains and started to flare few days ago w/straining.   Is not treating it in anyway. It is not painful.   Has had some bleeding on the toilet paper after wiping. Then last night at 10pm had a BM firm formed stool. Saw \"blood clots\" (pea size) on the toilet tissue and on the stool. No flow of blood. No bleeding since. No BM since.   Has hx of hemorrhoids - feels like the hemorrhoids has had in the past.   Usually has BM 1-2x per day. Doesn't usually feel constipated but does strain.   No abd pain.  No rectal pain  No fevers.  No NV.   No tarry stools.   No hx of colonoscopy  Not taking aspirin or blood thinners.   No CP, SOB, lightheadedness, dizziness.  No other abnormal bleeding- ie epistaxis, gingival or bruising.      Review of Systems   Constitutional, HEENT, cardiovascular, pulmonary, gi and gu " "systems are negative, except as otherwise noted.      Objective    /68 (BP Location: Left arm, Patient Position: Sitting, Cuff Size: Adult Large)   Pulse 113   Temp 98.6  F (37  C) (Temporal)   Resp 18   Ht 1.765 m (5' 9.49\")   Wt 92.7 kg (204 lb 4.8 oz)   SpO2 98%   BMI 29.75 kg/m    Body mass index is 29.75 kg/m .  Physical Exam   GENERAL: healthy, alert and no distress  NECK: no adenopathy, no asymmetry, masses, or scars and thyroid normal to palpation  RESP: lungs clear to auscultation - no rales, rhonchi or wheezes  CV: regular rate and rhythm, normal S1 S2, no S3 or S4, no murmur, click or rub, no peripheral edema and peripheral pulses strong  ABDOMEN: soft, nontender, no hepatosplenomegaly, no masses and bowel sounds normal  RECTAL: external anus- normal skin, no ext hemorrhoid, no fissure, nontender. HERBERT normal sphincter tone, no rectal masses. No blood on exam glove. No stool on glove.   MS: no gross musculoskeletal defects noted, no edema  SKIN: no suspicious lesions or rashes  NEURO: Normal strength and tone, mentation intact and speech normal  PSYCH: mentation appears normal, affect normal/bright    Results for orders placed or performed in visit on 06/08/23   CBC with platelets and differential     Status: None ()    Narrative    The following orders were created for panel order CBC with platelets and differential.  Procedure                               Abnormality         Status                     ---------                               -----------         ------                     CBC with platelets and d...[[id number]]                                                   Please view results for these tests on the individual orders.                   "

## 2023-12-03 ENCOUNTER — APPOINTMENT (OUTPATIENT)
Dept: GENERAL RADIOLOGY | Facility: CLINIC | Age: 33
End: 2023-12-03
Attending: EMERGENCY MEDICINE

## 2023-12-03 ENCOUNTER — HOSPITAL ENCOUNTER (EMERGENCY)
Facility: CLINIC | Age: 33
Discharge: HOME | End: 2023-12-03
Attending: EMERGENCY MEDICINE | Admitting: EMERGENCY MEDICINE

## 2023-12-03 VITALS
SYSTOLIC BLOOD PRESSURE: 119 MMHG | RESPIRATION RATE: 14 BRPM | TEMPERATURE: 99.7 F | HEIGHT: 71 IN | WEIGHT: 190 LBS | BODY MASS INDEX: 26.6 KG/M2 | OXYGEN SATURATION: 99 % | HEART RATE: 86 BPM | DIASTOLIC BLOOD PRESSURE: 75 MMHG

## 2023-12-03 DIAGNOSIS — S63.266A CLOSED DISLOCATION OF FIFTH METACARPAL BONE OF RIGHT HAND: ICD-10-CM

## 2023-12-03 DIAGNOSIS — S63.064A: ICD-10-CM

## 2023-12-03 PROCEDURE — 99152 MOD SED SAME PHYS/QHP 5/>YRS: CPT

## 2023-12-03 PROCEDURE — 73110 X-RAY EXAM OF WRIST: CPT | Mod: RT

## 2023-12-03 PROCEDURE — 250N000011 HC RX IP 250 OP 636: Performed by: EMERGENCY MEDICINE

## 2023-12-03 PROCEDURE — 999N000065 XR HAND RIGHT G/E 3 VIEWS: Mod: RT

## 2023-12-03 PROCEDURE — 250N000011 HC RX IP 250 OP 636: Mod: JZ

## 2023-12-03 PROCEDURE — 26670 TREAT HAND DISLOCATION: CPT | Mod: F8

## 2023-12-03 PROCEDURE — 73130 X-RAY EXAM OF HAND: CPT | Mod: RT

## 2023-12-03 PROCEDURE — 999N000157 HC STATISTIC RCP TIME EA 10 MIN

## 2023-12-03 PROCEDURE — 250N000013 HC RX MED GY IP 250 OP 250 PS 637: Performed by: EMERGENCY MEDICINE

## 2023-12-03 PROCEDURE — 99285 EMERGENCY DEPT VISIT HI MDM: CPT | Mod: 25

## 2023-12-03 RX ORDER — ONDANSETRON 2 MG/ML
INJECTION INTRAMUSCULAR; INTRAVENOUS
Status: COMPLETED
Start: 2023-12-03 | End: 2023-12-03

## 2023-12-03 RX ORDER — OXYCODONE HYDROCHLORIDE 5 MG/1
5 TABLET ORAL EVERY 6 HOURS PRN
Qty: 12 TABLET | Refills: 0 | Status: SHIPPED | OUTPATIENT
Start: 2023-12-03 | End: 2023-12-06

## 2023-12-03 RX ORDER — HYDROCODONE BITARTRATE AND ACETAMINOPHEN 5; 325 MG/1; MG/1
1 TABLET ORAL ONCE
Status: COMPLETED | OUTPATIENT
Start: 2023-12-03 | End: 2023-12-03

## 2023-12-03 RX ORDER — PROPOFOL 10 MG/ML
80 INJECTION, EMULSION INTRAVENOUS ONCE
Status: COMPLETED | OUTPATIENT
Start: 2023-12-03 | End: 2023-12-03

## 2023-12-03 RX ORDER — PROPOFOL 10 MG/ML
INJECTION, EMULSION INTRAVENOUS
Status: DISCONTINUED
Start: 2023-12-03 | End: 2023-12-03 | Stop reason: WASHOUT

## 2023-12-03 RX ADMIN — HYDROCODONE BITARTRATE AND ACETAMINOPHEN 1 TABLET: 5; 325 TABLET ORAL at 01:56

## 2023-12-03 RX ADMIN — ONDANSETRON 4 MG: 2 INJECTION INTRAMUSCULAR; INTRAVENOUS at 02:44

## 2023-12-03 RX ADMIN — PROPOFOL 120 MG: 10 INJECTION, EMULSION INTRAVENOUS at 02:58

## 2023-12-03 ASSESSMENT — ACTIVITIES OF DAILY LIVING (ADL): ADLS_ACUITY_SCORE: 35

## 2023-12-03 NOTE — PROGRESS NOTES
An ETCO2 monitor was placed on the pt with 2LPM bled in. The Ambu bag, suction, and airways were setup and present in the room, but not needed. Pt was able to maintain airway throughout the procedure with no intervention needed. ETCO2 levels were maintained between 38 -42    Ann Segura, RT

## 2023-12-03 NOTE — ED NOTES
After reduction by physician and splinting, patient is at baseline, ambulatory, and ready to go home

## 2023-12-03 NOTE — Clinical Note
Scooby Ellington was seen and treated in our emergency department on 12/3/2023.  He may return to work on 12/11/2023.  Excused from work until evaluated by ortho hand surgery.      If you have any questions or concerns, please don't hesitate to call.      Adolfo Giron MD

## 2023-12-03 NOTE — ED TRIAGE NOTES
Pt has obvious deformity to the right hand. + pulses. Able to move fingers. Pt had been drinking tonight and used pot.      Triage Assessment (Adult)       Row Name 12/03/23 0108          Triage Assessment    Airway WDL WDL        Respiratory WDL    Respiratory WDL WDL        Skin Circulation/Temperature WDL    Skin Circulation/Temperature WDL WDL        Cardiac WDL    Cardiac WDL WDL        Peripheral/Neurovascular WDL    Peripheral Neurovascular WDL WDL        Cognitive/Neuro/Behavioral WDL    Cognitive/Neuro/Behavioral WDL WDL
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
## 2024-03-17 ENCOUNTER — HEALTH MAINTENANCE LETTER (OUTPATIENT)
Age: 34
End: 2024-03-17

## 2024-08-20 NOTE — TELEPHONE ENCOUNTER
Prescription approved per [facility] Refill Protocol.  Patient has upcoming appointment 6/21 - will need new PHQ9.     MIKKI JohnstonN, RN  [facility]     [Symptom and Test Evaluation] : symptom and test evaluation

## 2024-09-18 ENCOUNTER — APPOINTMENT (OUTPATIENT)
Dept: GENERAL RADIOLOGY | Facility: CLINIC | Age: 34
End: 2024-09-18
Attending: EMERGENCY MEDICINE

## 2024-09-18 ENCOUNTER — HOSPITAL ENCOUNTER (EMERGENCY)
Facility: CLINIC | Age: 34
Discharge: HOME | End: 2024-09-18
Attending: EMERGENCY MEDICINE | Admitting: EMERGENCY MEDICINE

## 2024-09-18 VITALS
OXYGEN SATURATION: 99 % | TEMPERATURE: 98.2 F | BODY MASS INDEX: 25.2 KG/M2 | HEART RATE: 66 BPM | HEIGHT: 71 IN | DIASTOLIC BLOOD PRESSURE: 79 MMHG | RESPIRATION RATE: 24 BRPM | WEIGHT: 180 LBS | SYSTOLIC BLOOD PRESSURE: 119 MMHG

## 2024-09-18 DIAGNOSIS — F17.200 TOBACCO DEPENDENCE: ICD-10-CM

## 2024-09-18 DIAGNOSIS — R91.8 RIGHT LOWER LOBE PULMONARY INFILTRATE: ICD-10-CM

## 2024-09-18 DIAGNOSIS — R07.9 CHEST PAIN, UNSPECIFIED TYPE: ICD-10-CM

## 2024-09-18 LAB
ANION GAP SERPL CALCULATED.3IONS-SCNC: 15 MMOL/L (ref 7–15)
ATRIAL RATE - MUSE: 55 BPM
BASOPHILS # BLD AUTO: 0.1 10E3/UL (ref 0–0.2)
BASOPHILS NFR BLD AUTO: 1 %
BUN SERPL-MCNC: 12.6 MG/DL (ref 6–20)
CALCIUM SERPL-MCNC: 9.6 MG/DL (ref 8.8–10.4)
CHLORIDE SERPL-SCNC: 103 MMOL/L (ref 98–107)
CREAT SERPL-MCNC: 1.05 MG/DL (ref 0.67–1.17)
D DIMER PPP FEU-MCNC: <0.27 UG/ML FEU (ref 0–0.5)
DIASTOLIC BLOOD PRESSURE - MUSE: NORMAL MMHG
EGFRCR SERPLBLD CKD-EPI 2021: >90 ML/MIN/1.73M2
EOSINOPHIL # BLD AUTO: 0.7 10E3/UL (ref 0–0.7)
EOSINOPHIL NFR BLD AUTO: 6 %
ERYTHROCYTE [DISTWIDTH] IN BLOOD BY AUTOMATED COUNT: 13.7 % (ref 10–15)
GLUCOSE SERPL-MCNC: 97 MG/DL (ref 70–99)
HCO3 SERPL-SCNC: 22 MMOL/L (ref 22–29)
HCT VFR BLD AUTO: 50.8 % (ref 40–53)
HGB BLD-MCNC: 17 G/DL (ref 13.3–17.7)
HOLD SPECIMEN: NORMAL
IMM GRANULOCYTES # BLD: 0 10E3/UL
IMM GRANULOCYTES NFR BLD: 0 %
INTERPRETATION ECG - MUSE: NORMAL
LYMPHOCYTES # BLD AUTO: 2.5 10E3/UL (ref 0.8–5.3)
LYMPHOCYTES NFR BLD AUTO: 22 %
MCH RBC QN AUTO: 29.3 PG (ref 26.5–33)
MCHC RBC AUTO-ENTMCNC: 33.5 G/DL (ref 31.5–36.5)
MCV RBC AUTO: 87 FL (ref 78–100)
MONOCYTES # BLD AUTO: 0.8 10E3/UL (ref 0–1.3)
MONOCYTES NFR BLD AUTO: 7 %
NEUTROPHILS # BLD AUTO: 7.2 10E3/UL (ref 1.6–8.3)
NEUTROPHILS NFR BLD AUTO: 63 %
NRBC # BLD AUTO: 0 10E3/UL
NRBC BLD AUTO-RTO: 0 /100
P AXIS - MUSE: 45 DEGREES
PLATELET # BLD AUTO: 212 10E3/UL (ref 150–450)
POTASSIUM SERPL-SCNC: 4.5 MMOL/L (ref 3.4–5.3)
PR INTERVAL - MUSE: 136 MS
QRS DURATION - MUSE: 92 MS
QT - MUSE: 418 MS
QTC - MUSE: 399 MS
R AXIS - MUSE: 79 DEGREES
RBC # BLD AUTO: 5.81 10E6/UL (ref 4.4–5.9)
SODIUM SERPL-SCNC: 140 MMOL/L (ref 135–145)
SYSTOLIC BLOOD PRESSURE - MUSE: NORMAL MMHG
T AXIS - MUSE: 53 DEGREES
TROPONIN T SERPL HS-MCNC: <6 NG/L
VENTRICULAR RATE- MUSE: 55 BPM
WBC # BLD AUTO: 11.4 10E3/UL (ref 4–11)

## 2024-09-18 PROCEDURE — 85379 FIBRIN DEGRADATION QUANT: CPT | Performed by: EMERGENCY MEDICINE

## 2024-09-18 PROCEDURE — 85025 COMPLETE CBC W/AUTO DIFF WBC: CPT | Performed by: EMERGENCY MEDICINE

## 2024-09-18 PROCEDURE — 93005 ELECTROCARDIOGRAM TRACING: CPT

## 2024-09-18 PROCEDURE — 84295 ASSAY OF SERUM SODIUM: CPT | Performed by: EMERGENCY MEDICINE

## 2024-09-18 PROCEDURE — 36415 COLL VENOUS BLD VENIPUNCTURE: CPT | Performed by: EMERGENCY MEDICINE

## 2024-09-18 PROCEDURE — 85041 AUTOMATED RBC COUNT: CPT | Performed by: EMERGENCY MEDICINE

## 2024-09-18 PROCEDURE — 82374 ASSAY BLOOD CARBON DIOXIDE: CPT | Performed by: EMERGENCY MEDICINE

## 2024-09-18 PROCEDURE — 84484 ASSAY OF TROPONIN QUANT: CPT | Performed by: EMERGENCY MEDICINE

## 2024-09-18 PROCEDURE — 71046 X-RAY EXAM CHEST 2 VIEWS: CPT

## 2024-09-18 PROCEDURE — 99285 EMERGENCY DEPT VISIT HI MDM: CPT | Mod: 25

## 2024-09-18 RX ORDER — DOXYCYCLINE 100 MG/1
100 CAPSULE ORAL 2 TIMES DAILY
Qty: 14 CAPSULE | Refills: 0 | Status: SHIPPED | OUTPATIENT
Start: 2024-09-18 | End: 2024-09-25

## 2024-09-18 ASSESSMENT — ACTIVITIES OF DAILY LIVING (ADL)
ADLS_ACUITY_SCORE: 35

## 2024-09-18 ASSESSMENT — COLUMBIA-SUICIDE SEVERITY RATING SCALE - C-SSRS
6. HAVE YOU EVER DONE ANYTHING, STARTED TO DO ANYTHING, OR PREPARED TO DO ANYTHING TO END YOUR LIFE?: NO
1. IN THE PAST MONTH, HAVE YOU WISHED YOU WERE DEAD OR WISHED YOU COULD GO TO SLEEP AND NOT WAKE UP?: NO
2. HAVE YOU ACTUALLY HAD ANY THOUGHTS OF KILLING YOURSELF IN THE PAST MONTH?: NO

## 2024-09-18 NOTE — DISCHARGE INSTRUCTIONS
What do you do next:   Work on slowly decreasing your smoking and vaping.  Continue your home medications unless we have specifically changed them  If medications were prescribed today, take these as directed.  You can use over-the-counter acetaminophen (Tylenol ) and ibuprofen for fever or pain control as applicable to your visit today.  Acetaminophen (Tylenol): Take 500 to 1000 mg by mouth every 6 hours as needed for fever or pain.  Do not take more than 4000 total milligrams of acetaminophen-containing products in a 24-hour timeframe.  Ibuprofen: Take 600 milligrams by mouth every 6-8 hours as needed for fever or pain.  Take this with food or milk to avoid stomach upset.  Follow up as indicated below    When do you return: Review your discharge papers for specifics on reasons to return.    Thank you for allowing us to care for you today.

## 2024-09-18 NOTE — ED TRIAGE NOTES
"Pt c/o near syncope, shortness of breath and intermittent chest pain for the past 3 days. Pt noted that according to his phone his heart rate has been elevated in the 110 range. C/o tingling in fingers and toes. Pain in chest comes and lasts for a short period of time but feels sharp and goes across both sides of chest. Pain is worse on the left side of his chest. Pt has a \"smokers cough\" but denies other coughing.      Triage Assessment (Adult)       Row Name 09/18/24 1035          Respiratory WDL    Respiratory WDL --  feels short of breath, worse with movement, but even at rest pt feels short of breath        Skin Circulation/Temperature WDL    Skin Circulation/Temperature WDL WDL        Cardiac WDL    Cardiac WDL WDL        Peripheral/Neurovascular WDL    Peripheral Neurovascular WDL --  tingling in fingers and toes        Cognitive/Neuro/Behavioral WDL    Cognitive/Neuro/Behavioral WDL WDL                     "

## 2024-09-18 NOTE — Clinical Note
Scooby Ellington was seen and treated in our emergency department on 9/18/2024.  He may return to work on 09/19/2024.       If you have any questions or concerns, please don't hesitate to call.      Jaun Das, DO

## 2024-09-18 NOTE — ED PROVIDER NOTES
"Emergency Department Note      Code Status: No Order    History of Present Illness     Chief Complaint:  Shortness of Breath       HPI   Scooby Ellington is a 33 year old male who presents for evaluation of shortness of breath. Patient reports that around 3 days ago he began experiencing shortness of breath that developed into some minor chest tightness and has gradually been worsening so that this morning he was unable to catch his breath at all. Today he also endorses numbness in his hands radiating up into his elbows and tingling in his shoulder. He states that his hear rate normally sits between the 60s and 70s but today his phone showed he was around 110 prompting him to go into Urgent Care where he was given a 324 mg dosage of aspirin with no palliation. He has been around his partner's daughter who was recently sick with strep throat. Patient reports that today he has also been experiencing weakness and dizziness with exertion and ambulation, stating that while walking from his car into the ED today he felt he was near syncope and needed a wheelchair. Patient notes that he smokes a pack of cigarettes a day on average and has recently also begun using e-cigarettes.      Independent Historian:    None    Review of External Notes  None    Past Medical History   Medical History, Surgical History, Problem List, and Medications  Reviewed in Epic    Physical Exam   Patient Vitals for the past 24 hrs:   BP Temp Pulse Resp SpO2 Height Weight   09/18/24 1512 119/79 -- 66 -- 99 % -- --   09/18/24 1030 109/75 98.2  F (36.8  C) 71 24 98 % 1.803 m (5' 11\") 81.6 kg (180 lb)       Physical Exam  Constitutional: Vital signs reviewed as above.   Eyes: PEERL, EOMI B/L  Neck: No JVD noted. FROM   Cardiovascular: normal rate, Regular rhythm and normal heart sounds.  No murmur heard. Equal B/L peripheral pulses.  Pulmonary/Chest: Effort normal and breath sounds normal. No respiratory distress. Patient has no wheezes. Patient has " no rales.   Gastrointestinal: Soft. There is no tenderness.   Musculoskeletal/Extremities: No pitting edema noted. Normal tone.  Skin: Skin is warm and dry. There is no diaphoresis noted.   Psychiatric: The patient appears calm.     Diagnostics     Laboratory: Imaging:   Labs Ordered and Resulted from Time of ED Arrival to Time of ED Departure   CBC WITH PLATELETS AND DIFFERENTIAL - Abnormal       Result Value    WBC Count 11.4 (*)     RBC Count 5.81      Hemoglobin 17.0      Hematocrit 50.8      MCV 87      MCH 29.3      MCHC 33.5      RDW 13.7      Platelet Count 212      % Neutrophils 63      % Lymphocytes 22      % Monocytes 7      % Eosinophils 6      % Basophils 1      % Immature Granulocytes 0      NRBCs per 100 WBC 0      Absolute Neutrophils 7.2      Absolute Lymphocytes 2.5      Absolute Monocytes 0.8      Absolute Eosinophils 0.7      Absolute Basophils 0.1      Absolute Immature Granulocytes 0.0      Absolute NRBCs 0.0     BASIC METABOLIC PANEL - Normal    Sodium 140      Potassium 4.5      Chloride 103      Carbon Dioxide (CO2) 22      Anion Gap 15      Urea Nitrogen 12.6      Creatinine 1.05      GFR Estimate >90      Calcium 9.6      Glucose 97     TROPONIN T, HIGH SENSITIVITY - Normal    Troponin T, High Sensitivity <6     D DIMER QUANTITATIVE - Normal    D-Dimer Quantitative <0.27       Chest XR,  PA & LAT   Final Result   IMPRESSION: Minimal right lower lobe infiltrate. Remaining lungs   clear. The cardiac silhouette is not enlarged. Pulmonary vasculature   is unremarkable.      JUAN ANTONIO LAZAR MD            SYSTEM ID:  [id number]            EKG   ECG results from 09/18/24   EKG 12-lead, tracing only     Value    Systolic Blood Pressure     Diastolic Blood Pressure     Ventricular Rate 55    Atrial Rate 55    ND Interval 136    QRS Duration 92        QTc 399    P Axis 45    R AXIS 79    T Axis 53    Interpretation ECG      Sinus bradycardia  When compared with ECG from 18-Sep-2024 0937:  No  significant change    Read by Jaun Das DO at 1102          Independent Interpretation  See ED course    ED Course    Medications Administered  Medications - No data to display    Procedures  Procedures     Discussion of Management  See ED Course    ED Course  ED Course as of 09/18/24 1714   Wed Sep 18, 2024   1128 Initial evaluation.   1148 Nursing reports that the patient maintained a pulse oxygen saturation of 99% on RA with ambulation.   1439 Rechecked and updated.       Optional/Additional Documentation: None    Medical Decision Making / Diagnosis     MIPS     None    Medical Decision Making:  Scooby Ellington is a 33 year old male presented to the Emergency Department with a complaint of chest pain. Fortunately the workup in the ED has been unremarkable and at this time I am not concerned for ACS. The EKG shows sinus bradycardia without significant change from the Urgent Care EKG today. The troponin is negative. Based on the [facility] high sensitivity troponin pathway, this should rule the patient out for myocardial injury.    I considered other possible causes of chest pain including PE (negative d-dimer), infection, pneumothorax, aortic dissection, inflammatory conditions (percarditis, etc.) and even more benign causes such as reflux and esophageal motility issues. The physical exam, laboratory, and radiological findings listed above make life threatening conditions less likely.    CXR does suggest an infiltrate.  The patient notes that he has recently started vaping so certainly this could be due to vaping but given his discomfort, I will elect to prescribe antibiotics.     I have encouraged him to continue working on smoking cessation as well as vaping cessation.  At this time I believe the patient is safe for discharge. I have encouraged close outpatient follow up.     Anticipatory guidance given prior to discharge.         Critical Care:  None.    Disposition:  See ED Course and  [facility]    ICD-10 Codes:    ICD-10-CM    1. Chest pain, unspecified type  R07.9       2. Right lower lobe pulmonary infiltrate  R91.8     possibly due to pneumonia or vaping      3. Tobacco dependence  F17.200            Discharge Medications:  Discharge Medication List as of 9/18/2024  3:03 PM        START taking these medications    Details   doxycycline hyclate (VIBRAMYCIN) 100 MG capsule Take 1 capsule (100 mg) by mouth 2 times daily for 7 days., Disp-14 capsule, R-0, E-Prescribe            Scribe Disclosure:  IMartine, am serving as a scribe at 11:44 AM on 9/18/2024 to document services personally performed by Jaun Das DO based on my observations and the provider's statements to me.    9/18/2024   Jaun Das DO     [employer]                    Jaun Das DO  09/18/24 3938

## 2024-09-19 ENCOUNTER — PATIENT OUTREACH (OUTPATIENT)
Dept: FAMILY MEDICINE | Facility: CLINIC | Age: 34
End: 2024-09-19

## 2024-09-19 NOTE — TELEPHONE ENCOUNTER
Hospital Follow-up     Location: [facility] ED on 9/18.      Diagnosis: Chest pain, right lower lobe pulmonary infiltrate due to pneumonia or vaping.      Medications: Doxycycline 100 mg 2 times daily x 7 days.      Follow-up: PCP on 9/20 for recheck.      Routed to  nurse ricardo CARTER RN   Clinic RN  [facility]

## 2024-09-19 NOTE — TELEPHONE ENCOUNTER
"  Transitions of Care Outreach  Chief Complaint   Patient presents with    Hospital F/U     Most Recent Admission Date: 9/18/2024   Most Recent Admission Diagnosis:      Most Recent Discharge Date: 9/18/2024   Most Recent Discharge Diagnosis:   Chest pain, unspecified type - R07.9  Right lower lobe pulmonary infiltrate - R91.8  Tobacco dependence - F17.200     Transitions of Care Assessment    Discharge Assessment  How are you doing now that you are home?: \"I'm still tight-chested. I'm a  and tried going back to work this morning, but I only made it about 16 min before feeling dizzy and short of breath.\"  How are your symptoms? (Red Flag symptoms escalate to triage hotline per guidelines): Unchanged  Do you know how to contact your clinic care team if you have future questions or changes to your health status? : Yes  Does the patient have their discharge instructions? : Yes  Does the patient have questions regarding their discharge instructions? : No  Were you started on any new medications or were there changes to any of your previous medications? : Yes (Doxycycline)  Does the patient have all of their medications?: Yes  Do you have questions regarding any of your medications? : No  Do you have all of your needed medical supplies or equipment (DME)?  (i.e. oxygen tank, CPAP, cane, etc.): Yes    Additional info:  -Still having some chest-tightness, dull chest pain, and SOB w/ activity  -Reports having nasal congestion and productive cough w/ light green sputum for the pas 4-5 days  RN instructed patient to take home COVID test today and call back if positive     -Denies paresthesia in hands today   -Reviewed medication list with patient:  No longer taking both Wellbutrin and Adderall, felt like Wellbutrin wasn't regulating his depression enough   RN advised to discuss meds at appt tomorrow     -RN discussed red flags for immediate return to ER, instructed to call back with further questions/concerns " [phone]    Follow up Plan     Discharge Follow-Up  Discharge follow up appointment scheduled in alignment with recommended follow up timeframe or Transitions of Risk Category? (Low = within 30 days; Moderate= within 14 days; High= within 7 days): Yes  Discharge Follow Up Appointment Date: 09/20/24  Discharge Follow Up Appointment Scheduled with?: Primary Care Provider    Future Appointments   Date Time Provider Department Center   9/20/2024  4:30 PM Monroe Villasenor APRN CNP [facility]     Outpatient Plan as outlined on AVS reviewed with patient.    Patient was given an opportunity to ask questions, verbalized understanding of plan, and is agreeable.     For any urgent concerns, please contact our 24 hour nurse triage line: [phone] ([phone])       Tonia GATICA RN  Patient Advocate Liaison - PAL RN  [facility]

## 2024-09-20 ENCOUNTER — OFFICE VISIT (OUTPATIENT)
Dept: FAMILY MEDICINE | Facility: CLINIC | Age: 34
End: 2024-09-20

## 2024-09-20 VITALS
DIASTOLIC BLOOD PRESSURE: 74 MMHG | HEIGHT: 71 IN | SYSTOLIC BLOOD PRESSURE: 100 MMHG | HEART RATE: 113 BPM | WEIGHT: 187.1 LBS | BODY MASS INDEX: 26.19 KG/M2 | RESPIRATION RATE: 12 BRPM | TEMPERATURE: 99 F | OXYGEN SATURATION: 97 %

## 2024-09-20 DIAGNOSIS — F41.9 ANXIETY: ICD-10-CM

## 2024-09-20 DIAGNOSIS — R91.8 RIGHT LOWER LOBE PULMONARY INFILTRATE: Primary | ICD-10-CM

## 2024-09-20 DIAGNOSIS — R07.9 CHEST PAIN, UNSPECIFIED TYPE: ICD-10-CM

## 2024-09-20 DIAGNOSIS — R06.2 WHEEZING: ICD-10-CM

## 2024-09-20 DIAGNOSIS — F33.2 SEVERE EPISODE OF RECURRENT MAJOR DEPRESSIVE DISORDER, WITHOUT PSYCHOTIC FEATURES (H): ICD-10-CM

## 2024-09-20 DIAGNOSIS — R42 DIZZINESS: ICD-10-CM

## 2024-09-20 DIAGNOSIS — R73.03 PREDIABETES: ICD-10-CM

## 2024-09-20 DIAGNOSIS — R06.02 SOB (SHORTNESS OF BREATH): ICD-10-CM

## 2024-09-20 LAB
ERYTHROCYTE [DISTWIDTH] IN BLOOD BY AUTOMATED COUNT: 13.3 % (ref 10–15)
EST. AVERAGE GLUCOSE BLD GHB EST-MCNC: 114 MG/DL
HBA1C MFR BLD: 5.6 % (ref 0–5.6)
HCT VFR BLD AUTO: 49.2 % (ref 40–53)
HGB BLD-MCNC: 16.5 G/DL (ref 13.3–17.7)
MCH RBC QN AUTO: 29.5 PG (ref 26.5–33)
MCHC RBC AUTO-ENTMCNC: 33.5 G/DL (ref 31.5–36.5)
MCV RBC AUTO: 88 FL (ref 78–100)
PLATELET # BLD AUTO: 242 10E3/UL (ref 150–450)
RBC # BLD AUTO: 5.6 10E6/UL (ref 4.4–5.9)
WBC # BLD AUTO: 11.5 10E3/UL (ref 4–11)

## 2024-09-20 PROCEDURE — 99214 OFFICE O/P EST MOD 30 MIN: CPT

## 2024-09-20 PROCEDURE — 36415 COLL VENOUS BLD VENIPUNCTURE: CPT

## 2024-09-20 PROCEDURE — 83036 HEMOGLOBIN GLYCOSYLATED A1C: CPT

## 2024-09-20 PROCEDURE — 85027 COMPLETE CBC AUTOMATED: CPT

## 2024-09-20 PROCEDURE — G2211 COMPLEX E/M VISIT ADD ON: HCPCS

## 2024-09-20 RX ORDER — ALBUTEROL SULFATE 90 UG/1
2 AEROSOL, METERED RESPIRATORY (INHALATION) EVERY 6 HOURS PRN
Qty: 18 G | Refills: 0 | Status: SHIPPED | OUTPATIENT
Start: 2024-09-20

## 2024-09-20 RX ORDER — FLUOXETINE 10 MG/1
10 CAPSULE ORAL DAILY
Qty: 60 CAPSULE | Refills: 0 | Status: SHIPPED | OUTPATIENT
Start: 2024-09-20

## 2024-09-20 ASSESSMENT — PATIENT HEALTH QUESTIONNAIRE - PHQ9
SUM OF ALL RESPONSES TO PHQ QUESTIONS 1-9: 18
10. IF YOU CHECKED OFF ANY PROBLEMS, HOW DIFFICULT HAVE THESE PROBLEMS MADE IT FOR YOU TO DO YOUR WORK, TAKE CARE OF THINGS AT HOME, OR GET ALONG WITH OTHER PEOPLE: EXTREMELY DIFFICULT
SUM OF ALL RESPONSES TO PHQ QUESTIONS 1-9: 18

## 2024-09-20 ASSESSMENT — COLUMBIA-SUICIDE SEVERITY RATING SCALE - C-SSRS
6. HAVE YOU EVER DONE ANYTHING, STARTED TO DO ANYTHING, OR PREPARED TO DO ANYTHING TO END YOUR LIFE?: NO
1. WITHIN THE PAST MONTH, HAVE YOU WISHED YOU WERE DEAD OR WISHED YOU COULD GO TO SLEEP AND NOT WAKE UP?: YES
2. IN THE PAST MONTH, HAVE YOU ACTUALLY HAD ANY THOUGHTS OF KILLING YOURSELF?: NO

## 2024-09-20 ASSESSMENT — ANXIETY QUESTIONNAIRES
7. FEELING AFRAID AS IF SOMETHING AWFUL MIGHT HAPPEN: MORE THAN HALF THE DAYS
GAD7 TOTAL SCORE: 14
8. IF YOU CHECKED OFF ANY PROBLEMS, HOW DIFFICULT HAVE THESE MADE IT FOR YOU TO DO YOUR WORK, TAKE CARE OF THINGS AT HOME, OR GET ALONG WITH OTHER PEOPLE?: VERY DIFFICULT
GAD7 TOTAL SCORE: 14
GAD7 TOTAL SCORE: 14

## 2024-09-20 ASSESSMENT — ENCOUNTER SYMPTOMS: NERVOUS/ANXIOUS: 1

## 2024-09-20 ASSESSMENT — PAIN SCALES - GENERAL: PAINLEVEL: NO PAIN (1)

## 2024-09-20 NOTE — PROGRESS NOTES
Assessment & Plan     (R91.8) Right lower lobe pulmonary infiltrate  (primary encounter diagnosis)  (R07.9) Chest pain, unspecified type  (R42) Dizziness  (R06.02) SOB (shortness of breath)  Comment: concerned symptoms are from right lower infiltrate. Will add on Augmentin to patient Doxycycline. Will repeat CBC today. Will get Xray in 4-6 weeks to repeat to evaluate infiltrate progression. Will follow up on results and whether further direction is necessary.Discussed medication risks and benefits of Augmentin with patient in detail with patient verbal understanding. Will check in w/ patient next week to see how he is doing. Patient fully understands and is agreeable with plan of care, at this point patient will follow up as needed unless acute concerns arise in the meantime.  Plan: amoxicillin-clavulanate (AUGMENTIN) 875-125 MG         tablet, CBC with platelets, XR Chest 2 Views    (F33.2) Severe episode of recurrent major depressive disorder, without psychotic features (H)  (F41.9) Anxiety  Comment: uncontrolled. Patient denies any SI or intentions of self harm. Will trial patient on Prozac as he feels this may be a good option for him. Discussed medication risks and benefits of Prozac with patient in detail with patient verbal understanding. Adderall and Wellbutrin discontinued as he did not feel good on these and had increased intrusive thoughts and SI. Will follow up w/ patient in 1 month to reassess. Patient fully understands and is agreeable with plan of care, at this point patient will follow up as needed unless acute concerns arise in the meantime.  Plan: FLUoxetine (PROZAC) 10 MG capsule, PRIMARY CARE        FOLLOW-UP SCHEDULING    (R73.03) Prediabetes  Comment: patient requesting recheck. A1C ordered. Will follow up on results and whether further direction is necessary.  Plan: Hemoglobin A1c    (R06.2) Wheezing  Comment: noted on exam. Will provide patient albuterol to treat. Discussed medication risks  "and benefits of Albtuerol with patient in detail with patient verbal understanding. Patient fully understands and is agreeable with plan of care, at this point patient will follow up as needed unless acute concerns arise in the meantime.  Plan: albuterol (PROAIR HFA/PROVENTIL HFA/VENTOLIN         HFA) 108 (90 Base) MCG/ACT inhaler     The longitudinal plan of care for the diagnosis(es)/condition(s) as documented were addressed during this visit. Due to the added complexity in care, I will continue to support Levi in the subsequent management and with ongoing continuity of care.    MED REC REQUIRED  Post Medication Reconciliation Status: discharge medications reconciled and changed, per note/orders  Nicotine/Tobacco Cessation  He reports that he has been smoking cigars and cigarettes. He started smoking about 26 years ago. He has a 23.8 pack-year smoking history. He has quit using smokeless tobacco.  Nicotine/Tobacco Cessation Plan  Patient not ready quite yet      BMI  Estimated body mass index is 26.1 kg/m  as calculated from the following:    Height as of this encounter: 1.803 m (5' 11\").    Weight as of this encounter: 84.9 kg (187 lb 1.6 oz).       Depression Screening Follow Up        9/20/2024     3:47 PM   PHQ   PHQ-9 Total Score 18   Q9: Thoughts of better off dead/self-harm past 2 weeks Several days   F/U: Thoughts of suicide or self-harm No   F/U: Safety concerns No           9/20/2024     4:26 PM   C-SSRS (Brief [place])   Within the last month, have you wished you were dead or wished you could go to sleep and not wake up? Yes   Within the last month, have you had any actual thoughts of killing yourself? No   Within the last month, have you ever done anything, started to do anything, or prepared to do anything to end your life? No       Follow Up Actions Taken  Crisis resource information provided in the After Visit Summary  Scheduled appointment with [facility]    Discussed the following ways the patient can " "remain in a safe environment:  remove alcohol, remove drugs, dispose of old medications , remove access to firearms:  , remove things I could use to hurt myself:  , and be around others    Fifi Sims is a 33 year old, presenting for the following health issues:  ER F/U (Tight chest / SOD / a bit dizzy and no lunch//Dull pain - chest//ED Discharged 9/18/2024 (3 hours) [facility] Emergency [facility]/ Das, Jaun Santos DO Last attending   Treatment team Chest pain, unspecified type +2 more/Clinical impression Shortness of Breath-Chief complaint  / covid test negative) and Anxiety (Needs a different anxiety med)        9/20/2024     3:57 PM   Additional Questions   Accompanied by Kiersten girlfrienandre     Anxiety           ED/UC Followup:    Facility:  [facility] ED  Date of visit: 09-  Reason for visit: ER followup  Current Status: Covid neg -test 09-  / SOB / chest tightness  BP 91/58    Review of Systems  Constitutional, HEENT, cardiovascular, pulmonary, gi and gu systems are negative, except as otherwise noted.      Objective    /74 (BP Location: Right arm, Patient Position: Sitting, Cuff Size: Adult Regular)   Pulse 113   Temp 99  F (37.2  C) (Oral)   Resp 12   Ht 1.803 m (5' 11\")   Wt 84.9 kg (187 lb 1.6 oz)   SpO2 97%   BMI 26.10 kg/m    Body mass index is 26.1 kg/m .  Physical Exam  Vitals and nursing note reviewed.   Constitutional:       General: He is not in acute distress.     Appearance: Normal appearance. He is not ill-appearing.   Cardiovascular:      Rate and Rhythm: Normal rate and regular rhythm.      Heart sounds: No murmur heard.     No friction rub. No gallop.   Pulmonary:      Effort: Pulmonary effort is normal. No accessory muscle usage or respiratory distress.      Breath sounds: Examination of the right-middle field reveals wheezing. Examination of the right-lower field reveals wheezing and rales. Wheezing and rales present. No decreased " breath sounds or rhonchi.      Comments: Congested cough noted during exam.   Skin:     General: Skin is warm and dry.   Neurological:      Mental Status: He is alert.   Psychiatric:         Mood and Affect: Mood normal.         Behavior: Behavior normal. Behavior is cooperative.         Thought Content: Thought content normal.         Judgment: Judgment normal.            Signed Electronically by: ARMANI Campos CNP    Answers submitted by the patient for this visit:  Patient Health Questionnaire (Submitted on 9/20/2024)  If you checked off any problems, how difficult have these problems made it for you to do your work, take care of things at home, or get along with other people?: Extremely difficult  PHQ9 TOTAL SCORE: 18  Patient Health Questionnaire (G7) (Submitted on 9/20/2024)  ISABEL 7 TOTAL SCORE: 14

## 2024-09-20 NOTE — LETTER
September 20, 2024      Scooby Ellington  [address] [address]        To Whom It May Concern:    Scooby Ellington  was seen on 9/20/2024.  Please excuse him  through 9/27/2024 due to illness.    Sincerely,        ARMANI Campos CNP

## 2024-09-20 NOTE — PATIENT INSTRUCTIONS
Adding on Augmentin  -labs today  -chest xray in 4 weeks  -albuterol for wheezing/SOB  -will check in next week to see how you are doing    Depression Anxiety  -start Prozac  -follow up in 1 month

## 2024-09-27 ENCOUNTER — TELEPHONE (OUTPATIENT)
Dept: FAMILY MEDICINE | Facility: CLINIC | Age: 34
End: 2024-09-27

## 2024-09-27 NOTE — TELEPHONE ENCOUNTER
Can we please check in on patient to see how he is doing w/ his current pneumonia?    ARMANI Campos CNP

## 2024-09-27 NOTE — LETTER
October 3, 2024      Scooby Ellington  [address] [address]        Dear Levi,     How are you doing with your pneumonia? We were unable to reach you by phone. Please call the clinic and ask to speak to a nurse to provide an update.      Sincerely,        Monroe Villasenor / Oliva Cheung RN

## 2024-09-30 NOTE — TELEPHONE ENCOUNTER
Outgoing call to patient. Attempt # 2. Left message to call back any nurse.    Oliva Cheung RN on 9/30/2024 at 10:44 AM

## 2024-10-01 NOTE — TELEPHONE ENCOUNTER
Attempt x 3. Called pt and left a message to call back to [phone] and to ask to speak to a nurse.     When pt calls back,     Check on how he is recovering from pneumonia.     Alice CARTER, RN   Clinic RN  [facility]

## 2024-10-02 NOTE — TELEPHONE ENCOUNTER
No return call after 3 attempts   Sent my chart message - if no return call by 10/3/2024 send letter     Cheri Arroyo Registered Nurse  [facility]

## 2024-10-23 ENCOUNTER — ANCILLARY PROCEDURE (OUTPATIENT)
Dept: GENERAL RADIOLOGY | Facility: CLINIC | Age: 34
End: 2024-10-23

## 2024-10-23 ENCOUNTER — OFFICE VISIT (OUTPATIENT)
Dept: FAMILY MEDICINE | Facility: CLINIC | Age: 34
End: 2024-10-23

## 2024-10-23 VITALS
HEIGHT: 71 IN | RESPIRATION RATE: 15 BRPM | OXYGEN SATURATION: 98 % | DIASTOLIC BLOOD PRESSURE: 64 MMHG | HEART RATE: 94 BPM | WEIGHT: 188 LBS | TEMPERATURE: 98.1 F | BODY MASS INDEX: 26.32 KG/M2 | SYSTOLIC BLOOD PRESSURE: 98 MMHG

## 2024-10-23 DIAGNOSIS — F41.9 ANXIETY: ICD-10-CM

## 2024-10-23 DIAGNOSIS — F33.2 SEVERE EPISODE OF RECURRENT MAJOR DEPRESSIVE DISORDER, WITHOUT PSYCHOTIC FEATURES (H): ICD-10-CM

## 2024-10-23 DIAGNOSIS — R06.02 SOB (SHORTNESS OF BREATH): ICD-10-CM

## 2024-10-23 DIAGNOSIS — R91.8 RIGHT LOWER LOBE PULMONARY INFILTRATE: Primary | ICD-10-CM

## 2024-10-23 DIAGNOSIS — R05.1 ACUTE COUGH: ICD-10-CM

## 2024-10-23 PROCEDURE — G2211 COMPLEX E/M VISIT ADD ON: HCPCS

## 2024-10-23 PROCEDURE — 71046 X-RAY EXAM CHEST 2 VIEWS: CPT | Mod: TC | Performed by: RADIOLOGY

## 2024-10-23 PROCEDURE — 99213 OFFICE O/P EST LOW 20 MIN: CPT

## 2024-10-23 RX ORDER — PREDNISONE 20 MG/1
40 TABLET ORAL DAILY
Qty: 10 TABLET | Refills: 0 | Status: SHIPPED | OUTPATIENT
Start: 2024-10-23 | End: 2024-10-28

## 2024-10-23 ASSESSMENT — ANXIETY QUESTIONNAIRES
GAD7 TOTAL SCORE: 1
1. FEELING NERVOUS, ANXIOUS, OR ON EDGE: SEVERAL DAYS
GAD7 TOTAL SCORE: 1
2. NOT BEING ABLE TO STOP OR CONTROL WORRYING: NOT AT ALL
4. TROUBLE RELAXING: NOT AT ALL
3. WORRYING TOO MUCH ABOUT DIFFERENT THINGS: NOT AT ALL
IF YOU CHECKED OFF ANY PROBLEMS ON THIS QUESTIONNAIRE, HOW DIFFICULT HAVE THESE PROBLEMS MADE IT FOR YOU TO DO YOUR WORK, TAKE CARE OF THINGS AT HOME, OR GET ALONG WITH OTHER PEOPLE: NOT DIFFICULT AT ALL
7. FEELING AFRAID AS IF SOMETHING AWFUL MIGHT HAPPEN: NOT AT ALL
GAD7 TOTAL SCORE: 1
5. BEING SO RESTLESS THAT IT IS HARD TO SIT STILL: NOT AT ALL
7. FEELING AFRAID AS IF SOMETHING AWFUL MIGHT HAPPEN: NOT AT ALL
8. IF YOU CHECKED OFF ANY PROBLEMS, HOW DIFFICULT HAVE THESE MADE IT FOR YOU TO DO YOUR WORK, TAKE CARE OF THINGS AT HOME, OR GET ALONG WITH OTHER PEOPLE?: NOT DIFFICULT AT ALL
6. BECOMING EASILY ANNOYED OR IRRITABLE: NOT AT ALL

## 2024-10-23 ASSESSMENT — ENCOUNTER SYMPTOMS: NERVOUS/ANXIOUS: 1

## 2024-10-23 ASSESSMENT — PATIENT HEALTH QUESTIONNAIRE - PHQ9
SUM OF ALL RESPONSES TO PHQ QUESTIONS 1-9: 2
SUM OF ALL RESPONSES TO PHQ QUESTIONS 1-9: 2
10. IF YOU CHECKED OFF ANY PROBLEMS, HOW DIFFICULT HAVE THESE PROBLEMS MADE IT FOR YOU TO DO YOUR WORK, TAKE CARE OF THINGS AT HOME, OR GET ALONG WITH OTHER PEOPLE: NOT DIFFICULT AT ALL

## 2024-10-23 NOTE — PROGRESS NOTES
Assessment & Plan     (R91.8) Right lower lobe pulmonary infiltrate  (primary encounter diagnosis)  Comment: revaluation today w/ Cxray today. Patient continues to have symptoms of SOB and coughing. More so in morning, takes albuterol with good improvement. Patient however resides in environment that has high concerns for mold, suspect this is a major contributor to patients symptoms. Patient also current smoker, very possible that patient could be having underlying lung disease that has developed as a result. Discussed possible ICS therapy, shared decision to hold on this as patient noted improved breathing when he stays at his [facility] which is a newer facility that does not have concerns of mold build up. Will follow up on Cxray results and whether further direction is necessary. Patient fully understands and is agreeable with plan of care, at this point patient will follow up as needed unless acute concerns arise in the meantime.   Plan: XR Chest 2 Views    (R06.02) SOB (shortness of breath)  (R05.1) Acute cough  Comment: will provide patient prednisone burst to see if this can help w/ patient's current symptoms. Discussed medication risks and benefits of Prednisone with patient in detail with patient verbal understanding. Patient fully understands and is agreeable with plan of care, at this point patient will follow up as needed unless acute concerns arise in the meantime.  Plan: predniSONE (DELTASONE) 20 MG tablet    (F33.2) Severe episode of recurrent major depressive disorder, without psychotic features (H)  (F41.9) Anxiety  Comment: better, patient titrated up to 20 mg. Discussed having patient follow up at upcoming visit to be re addressed. Sooner if necessary. Patient fully understands and is agreeable with plan of care, at this point patient will follow up as needed unless acute concerns arise in the meantime.  Plan: FLUoxetine (PROZAC) 20 MG capsule     The longitudinal plan of care for the  "diagnosis(es)/condition(s) as documented were addressed during this visit. Due to the added complexity in care, I will continue to support Levi in the subsequent management and with ongoing continuity of care.    BMI  Estimated body mass index is 26.22 kg/m  as calculated from the following:    Height as of this encounter: 1.803 m (5' 11\").    Weight as of this encounter: 85.3 kg (188 lb).     Subjective   Levi is a 34 year old, presenting for the following health issues:  Depression, Anxiety, and Shortness of Breath        10/23/2024     2:58 PM   Additional Questions   Roomed by Marley Otero     History of Present Illness       Mental Health Follow-up:  Patient presents to follow-up on Depression & Anxiety.Patient's depression since last visit has been:  Better  The patient is not having other symptoms associated with depression.  Patient's anxiety since last visit has been:  Better  The patient is having other symptoms associated with anxiety.  Any significant life events: relationship concerns  Patient is not feeling anxious or having panic attacks.  Patient has no concerns about alcohol or drug use.    Reason for visit:  Follow up x ray    He eats 2-3 servings of fruits and vegetables daily.He consumes 2 sweetened beverage(s) daily.He exercises with enough effort to increase his heart rate 60 or more minutes per day.  He exercises with enough effort to increase his heart rate 7 days per week.   He is taking medications regularly.    Review of Systems  Constitutional, HEENT, cardiovascular, pulmonary, gi and gu systems are negative, except as otherwise noted.      Objective    BP 98/64 (BP Location: Right arm, Patient Position: Sitting, Cuff Size: Adult Large)   Pulse 94   Temp 98.1  F (36.7  C) (Temporal)   Resp 15   Ht 1.803 m (5' 11\")   Wt 85.3 kg (188 lb)   SpO2 98%   BMI 26.22 kg/m    Body mass index is 26.22 kg/m .  Physical Exam  Vitals and nursing note reviewed.   Constitutional:       General: " He is not in acute distress.     Appearance: Normal appearance. He is not ill-appearing.   HENT:      Nose: No congestion.   Eyes:      General:         Right eye: No discharge.         Left eye: No discharge.      Conjunctiva/sclera: Conjunctivae normal.      Pupils: Pupils are equal, round, and reactive to light.   Cardiovascular:      Rate and Rhythm: Normal rate and regular rhythm.      Heart sounds: No murmur heard.     No friction rub. No gallop.   Pulmonary:      Effort: No accessory muscle usage or respiratory distress.      Breath sounds: No stridor. Examination of the right-lower field reveals rales. Rales present. No decreased breath sounds, wheezing or rhonchi.   Skin:     General: Skin is warm and dry.   Neurological:      Mental Status: He is alert.   Psychiatric:         Mood and Affect: Mood normal.         Behavior: Behavior normal.         Thought Content: Thought content normal.         Judgment: Judgment normal.        Signed Electronically by: ARMANI Campos CNP

## 2024-10-24 ENCOUNTER — TELEPHONE (OUTPATIENT)
Dept: FAMILY MEDICINE | Facility: CLINIC | Age: 34
End: 2024-10-24

## 2024-10-24 NOTE — TELEPHONE ENCOUNTER
Monroe Villasenor APRN CNP  [facility]  Please notify patient that his chest xray looks good, notes resolution of lung infiltrate.    ARMANI Campos CNP

## 2024-10-28 NOTE — TELEPHONE ENCOUNTER
Pt viewed [employer] Mobile message. Closing encounter.     Alice CARTER RN   Clinic RN  [facility]

## 2025-03-22 ENCOUNTER — HEALTH MAINTENANCE LETTER (OUTPATIENT)
Age: 35
End: 2025-03-22

## 2025-03-25 ENCOUNTER — HOSPITAL ENCOUNTER (EMERGENCY)
Facility: CLINIC | Age: 35
Discharge: HOME | End: 2025-03-25
Attending: EMERGENCY MEDICINE | Admitting: EMERGENCY MEDICINE

## 2025-03-25 VITALS
HEART RATE: 75 BPM | OXYGEN SATURATION: 97 % | TEMPERATURE: 97.3 F | WEIGHT: 185.41 LBS | SYSTOLIC BLOOD PRESSURE: 149 MMHG | BODY MASS INDEX: 25.86 KG/M2 | RESPIRATION RATE: 18 BRPM | DIASTOLIC BLOOD PRESSURE: 95 MMHG

## 2025-03-25 DIAGNOSIS — H66.003 NON-RECURRENT ACUTE SUPPURATIVE OTITIS MEDIA OF BOTH EARS WITHOUT SPONTANEOUS RUPTURE OF TYMPANIC MEMBRANES: ICD-10-CM

## 2025-03-25 PROCEDURE — 96372 THER/PROPH/DIAG INJ SC/IM: CPT | Performed by: EMERGENCY MEDICINE

## 2025-03-25 PROCEDURE — 99284 EMERGENCY DEPT VISIT MOD MDM: CPT

## 2025-03-25 PROCEDURE — 250N000011 HC RX IP 250 OP 636: Performed by: EMERGENCY MEDICINE

## 2025-03-25 RX ORDER — KETOROLAC TROMETHAMINE 15 MG/ML
15 INJECTION, SOLUTION INTRAMUSCULAR; INTRAVENOUS ONCE
Status: COMPLETED | OUTPATIENT
Start: 2025-03-25 | End: 2025-03-25

## 2025-03-25 RX ORDER — TETRACAINE HYDROCHLORIDE 5 MG/ML
2 SOLUTION OPHTHALMIC ONCE
Status: DISCONTINUED | OUTPATIENT
Start: 2025-03-25 | End: 2025-03-26 | Stop reason: HOSPADM

## 2025-03-25 RX ADMIN — KETOROLAC TROMETHAMINE 15 MG: 15 INJECTION, SOLUTION INTRAMUSCULAR; INTRAVENOUS at 23:19

## 2025-03-25 ASSESSMENT — COLUMBIA-SUICIDE SEVERITY RATING SCALE - C-SSRS
2. HAVE YOU ACTUALLY HAD ANY THOUGHTS OF KILLING YOURSELF IN THE PAST MONTH?: NO
6. HAVE YOU EVER DONE ANYTHING, STARTED TO DO ANYTHING, OR PREPARED TO DO ANYTHING TO END YOUR LIFE?: NO
1. IN THE PAST MONTH, HAVE YOU WISHED YOU WERE DEAD OR WISHED YOU COULD GO TO SLEEP AND NOT WAKE UP?: NO

## 2025-03-25 ASSESSMENT — ACTIVITIES OF DAILY LIVING (ADL): ADLS_ACUITY_SCORE: 41

## 2025-03-26 NOTE — DISCHARGE INSTRUCTIONS
Pain appears to be from a developing ear infection on the right with pus in the bottom portion of the eardrum.  Able to take a few days for the antibiotics to kick in.  You can take ibuprofen (Advil) 600 mg every 6 hours and acetaminophen (Tylenol) 1000 mg every 6 hours for pain.  You can alternate one or the other every 3 hours and you can set timers on your phone to make sure that you stay on top of the pain.

## 2025-03-26 NOTE — ED TRIAGE NOTES
Presents to triage with c/o R sided otalgia. He was seen yesterday at [facility] and at that time his L ear drum was ruptured and he was started on abx which he is currently taking. Tonight his R ear began having 10/10 pain and difficulty hearing. He also states he is feeling a lot of pressure in his head. He has a history of glaucoma and had to have L eye removed due to pressure. States the pressure feels similar to when that happened.

## 2025-03-26 NOTE — ED PROVIDER NOTES
Emergency Department Note      History of Present Illness     Chief Complaint   Otalgia    HPI   Scooby Ellington is a 34 year old male with a history as noted below who presents to the ED for otalgia. Two days ago, the patient was seen at Urgent Care for left ear pain. He was diagnosed with left otitis media with perforation. Today, he began to experience right ear pain similar to the pain in his left ear. He denies fever or sore throat. He endorses a few weeks of cough and congestion. No vomiting or vision changes. He has not taken any medications for his symptoms.     Independent Historian   None    Review of External Notes   Reviewed Urgent Care note from 3/24/2025. Patient was seen for left ear pain. He was diagnosed with left otitis media with perforation. He was prescribed Augmentin and Ciprodex drops, and he was referred to ENT.    Past Medical History   Medical History and Problem List   MRSA  ADHD  Anxiety  Depression  Glaucoma  GERD  Marijuana use disorder  Cocaine use disorder    Medications   Metronidazole  Albuterol  Fluoxetine    Surgical History   Blepharoplasty  Cataract  Appendectomy    Physical Exam   Patient Vitals for the past 24 hrs:   BP Temp Temp src Pulse Resp SpO2 Weight   03/25/25 2236 (!) 149/95 97.3  F (36.3  C) Temporal 75 18 97 % 84.1 kg (185 lb 6.5 oz)     Physical Exam  GENERAL: Awake, alert  ENT: No facial swelling, no tenderness to palpation of the dentition or dental abscess, patient has some mild TMJ tenderness bilaterally, no mastoid tenderness or swelling, no tenderness to palpation of the pinna, patient does have an erythematous left TM with circular area of perforation at the 7 o'clock position and patient does have a layering purulent effusion to the right TM that is erythematous as well, intraocular pressure on the right is 9, no corneal abrasion on slit-lamp exam, patient has prosthetic   Neck: No abnormal lymphadenopathy, normal range of motion of the  neck    Diagnostics   Lab Results   Labs Ordered and Resulted from Time of ED Arrival to Time of ED Departure - No data to display    Imaging   No orders to display       ED Course    Medications Administered   Medications   fluorescein (FUL-CALVIN) ophthalmic strip 1 strip (has no administration in time range)   tetracaine (PONTOCAINE) 0.5 % ophthalmic solution 2 drop (has no administration in time range)   ketorolac (TORADOL) injection 15 mg (15 mg Intramuscular $Given 3/25/25 8338)       Procedures   Procedures       ED Course   ED Course as of 03/25/25 2345   Tue Mar 25, 2025   2300 I obtained history and examined the patient as noted above.       Additional Documentation  None    Medical Decision Making / Diagnosis   CMS Diagnoses: None    MIPS       None    MDM   Scooby Ellington is a 34 year old male who presents for evaluation of right ear pain.  He has normal vital signs here.  He has no mastoid tenderness, normal range of motion of the neck, appears clinically well, no signs of deep space infection.  He does appear to have a layering purulent effusion, likely has a developing otitis media, was just put on Augmentin.  He was reassured, no signs of dental infection or any facial swelling and he was given a dose of Toradol here IM, and can take Tylenol and ibuprofen at home.    Disposition   The patient was discharged.     Diagnosis     ICD-10-CM    1. Non-recurrent acute suppurative otitis media of both ears without spontaneous rupture of tympanic membranes  H66.003            Discharge Medications   Discharge Medication List as of 3/25/2025 11:24 PM        Scribe Disclosure:  Chuckie WOODRUFF, am serving as a scribe at 11:10 PM on 3/25/2025 to document services personally performed by Jenny Johnson MD based on my observations and the provider's statements to me.        Jenny Johnson MD  03/25/25 2349

## 2025-05-28 ENCOUNTER — NURSE TRIAGE (OUTPATIENT)
Dept: FAMILY MEDICINE | Facility: CLINIC | Age: 35
End: 2025-05-28

## 2025-05-28 NOTE — TELEPHONE ENCOUNTER
Patient unable to makes sooner appt work than scheduled visit due to his schedule.  He will go to UC or ER sooner if needed.  Is only  at his job and can not miss work.  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  Weight 190# down to 176# in the last few months without trying.      Reason for Disposition   Chest pain(s) lasting a few seconds persists > 3 days    Additional Information   Negative: SEVERE difficulty breathing (e.g., struggling for each breath, speaks in single words)   Negative: Difficult to awaken or acting confused (e.g., disoriented, slurred speech)   Negative: Shock suspected (e.g., cold/pale/clammy skin, too weak to stand, low BP, rapid pulse)   Negative: Passed out (i.e., lost consciousness, collapsed and was not responding)   Negative: Chest pain lasting longer than 5 minutes and ANY of the following:         Pain is crushing, pressure-like, or heavy         Over 44 years old          Over 30 years old and one cardiac risk factor (e.g diabetes, high blood pressure, high cholesterol, smoker, or family history of heart disease)         History of heart disease (e.g. angina, heart attack, heart failure, bypass surgery, takes nitroglycerin)   Negative: Heart beating < 50 beats per minute OR > 140 beats per minute   Negative: Visible sweat on face or sweat dripping down face   Negative: Sounds like a life-threatening emergency to the triager   Negative: Followed an injury to chest   Negative: SEVERE chest pain   Negative: Pain also in shoulder(s) or arm(s) or jaw   Negative: Difficulty breathing   Negative: Cocaine use within last 3 days   Negative: Major surgery in the past month   Negative: Hip or leg fracture (broken bone) in past month (or had cast on leg or ankle in past month)   Negative: Illness requiring prolonged bedrest in past month (e.g., immobilization, long hospital stay)   Negative: Long-distance travel in past month (e.g., car, bus,  "train, plane; with trip lasting 6 or more hours)   Negative: History of prior 'blood clot' in leg or lungs (i.e., deep vein thrombosis, pulmonary embolism)   Negative: History of inherited increased risk of blood clots (e.g., Factor 5 Leiden, Anti-thrombin 3, Protein C or Protein S deficiency, Prothrombin mutation)   Negative: Cancer treatment in the past two months (or has cancer now)   Negative: Heart beating irregularly or very rapidly   Negative: Chest pain lasting longer than 5 minutes and occurred in last 3 days (72 hours) (Exception: Feels exactly the same as previously diagnosed heartburn and has accompanying sour taste in mouth.)   Negative: Chest pain or 'angina' comes and goes and is happening more often (increasing in frequency) or getting worse (increasing in severity) (Exception: Chest pains that last only a few seconds.)   Negative: Dizziness or lightheadedness   Negative: Coughing up blood   Negative: Patient sounds very sick or weak to the triager   Negative: Patient says chest pain feels exactly the same as previously diagnosed 'heartburn' and describes burning in chest and accompanying sour taste in mouth   Negative: Fever > 100.4 F (38.0 C)    Answer Assessment - Initial Assessment Questions  1. LOCATION: \"Where does it hurt?\"        Arm pit/side on (R) side happens when doing something or just relaxing, feels like being poked by a sharp object  2. RADIATION: \"Does the pain go anywhere else?\" (e.g., into neck, jaw, arms, back)      No   3. ONSET: \"When did the chest pain begin?\" (Minutes, hours or days)       2- 2 1/2 weeks ago  4. PATTERN: \"Does the pain come and go, or has it been constant since it started?\"  \"Does it get worse with exertion?\"       Comes and goes  5. DURATION: \"How long does it last\" (e.g., seconds, minutes, hours)      A couple of minutes, longest about 5 minutes   6. SEVERITY: \"How bad is the pain?\"  (e.g., Scale 1-10; mild, moderate, or severe)     - MILD (1-3): doesn't " "interfere with normal activities      - MODERATE (4-7): interferes with normal activities or awakens from sleep     - SEVERE (8-10): excruciating pain, unable to do any normal activities        Mild   7. CARDIAC RISK FACTORS: \"Do you have any history of heart problems or risk factors for heart disease?\" (e.g., angina, prior heart attack; diabetes, high blood pressure, high cholesterol, smoker, or strong family history of heart disease)      Smoker   8. PULMONARY RISK FACTORS: \"Do you have any history of lung disease?\"  (e.g., blood clots in lung, asthma, emphysema, birth control pills)      Has inhaler but states no diagnosis   9. CAUSE: \"What do you think is causing the chest pain?\"      Unknown   10. OTHER SYMPTOMS: \"Do you have any other symptoms?\" (e.g., dizziness, nausea, vomiting, sweating, fever, difficulty breathing, cough)        Cough but chest pain started prior to cough, winded in am when coughs   11. PREGNANCY: \"Is there any chance you are pregnant?\" \"When was your last menstrual period?\"        N/a    Protocols used: Chest Pain-A-[place]    "

## 2025-06-09 ENCOUNTER — ANCILLARY PROCEDURE (OUTPATIENT)
Dept: GENERAL RADIOLOGY | Facility: CLINIC | Age: 35
End: 2025-06-09

## 2025-06-09 ENCOUNTER — OFFICE VISIT (OUTPATIENT)
Dept: FAMILY MEDICINE | Facility: CLINIC | Age: 35
End: 2025-06-09

## 2025-06-09 VITALS
WEIGHT: 178 LBS | DIASTOLIC BLOOD PRESSURE: 80 MMHG | BODY MASS INDEX: 24.92 KG/M2 | RESPIRATION RATE: 14 BRPM | OXYGEN SATURATION: 97 % | HEIGHT: 71 IN | TEMPERATURE: 97.7 F | HEART RATE: 75 BPM | SYSTOLIC BLOOD PRESSURE: 115 MMHG

## 2025-06-09 DIAGNOSIS — B35.3 TINEA PEDIS OF BOTH FEET: ICD-10-CM

## 2025-06-09 DIAGNOSIS — R05.2 SUBACUTE COUGH: ICD-10-CM

## 2025-06-09 DIAGNOSIS — F41.9 ANXIETY: ICD-10-CM

## 2025-06-09 DIAGNOSIS — R63.4 WEIGHT LOSS: ICD-10-CM

## 2025-06-09 DIAGNOSIS — F33.2 SEVERE EPISODE OF RECURRENT MAJOR DEPRESSIVE DISORDER, WITHOUT PSYCHOTIC FEATURES (H): ICD-10-CM

## 2025-06-09 DIAGNOSIS — K62.5 BRBPR (BRIGHT RED BLOOD PER RECTUM): Primary | ICD-10-CM

## 2025-06-09 LAB
BASOPHILS # BLD AUTO: 0.1 10E3/UL (ref 0–0.2)
BASOPHILS NFR BLD AUTO: 1 %
EOSINOPHIL # BLD AUTO: 0.8 10E3/UL (ref 0–0.7)
EOSINOPHIL NFR BLD AUTO: 10 %
ERYTHROCYTE [DISTWIDTH] IN BLOOD BY AUTOMATED COUNT: 13.2 % (ref 10–15)
HCT VFR BLD AUTO: 44.7 % (ref 40–53)
HGB BLD-MCNC: 14.7 G/DL (ref 13.3–17.7)
IMM GRANULOCYTES # BLD: 0 10E3/UL
IMM GRANULOCYTES NFR BLD: 0 %
LYMPHOCYTES # BLD AUTO: 2.6 10E3/UL (ref 0.8–5.3)
LYMPHOCYTES NFR BLD AUTO: 33 %
MCH RBC QN AUTO: 28.4 PG (ref 26.5–33)
MCHC RBC AUTO-ENTMCNC: 32.9 G/DL (ref 31.5–36.5)
MCV RBC AUTO: 87 FL (ref 78–100)
MONOCYTES # BLD AUTO: 0.5 10E3/UL (ref 0–1.3)
MONOCYTES NFR BLD AUTO: 6 %
NEUTROPHILS # BLD AUTO: 4.1 10E3/UL (ref 1.6–8.3)
NEUTROPHILS NFR BLD AUTO: 51 %
PLATELET # BLD AUTO: 225 10E3/UL (ref 150–450)
RBC # BLD AUTO: 5.17 10E6/UL (ref 4.4–5.9)
WBC # BLD AUTO: 8.1 10E3/UL (ref 4–11)

## 2025-06-09 PROCEDURE — 99214 OFFICE O/P EST MOD 30 MIN: CPT

## 2025-06-09 PROCEDURE — 36415 COLL VENOUS BLD VENIPUNCTURE: CPT

## 2025-06-09 PROCEDURE — 85025 COMPLETE CBC W/AUTO DIFF WBC: CPT

## 2025-06-09 PROCEDURE — 80053 COMPREHEN METABOLIC PANEL: CPT

## 2025-06-09 PROCEDURE — 71046 X-RAY EXAM CHEST 2 VIEWS: CPT | Mod: TC | Performed by: RADIOLOGY

## 2025-06-09 RX ORDER — KETOCONAZOLE 20 MG/G
CREAM TOPICAL DAILY
Qty: 60 G | Refills: 0 | Status: SHIPPED | OUTPATIENT
Start: 2025-06-09

## 2025-06-09 RX ORDER — FLUOXETINE 10 MG/1
10 CAPSULE ORAL DAILY
Qty: 90 CAPSULE | Refills: 0 | Status: SHIPPED | OUTPATIENT
Start: 2025-06-09 | End: 2025-09-07

## 2025-06-09 ASSESSMENT — ANXIETY QUESTIONNAIRES
7. FEELING AFRAID AS IF SOMETHING AWFUL MIGHT HAPPEN: MORE THAN HALF THE DAYS
8. IF YOU CHECKED OFF ANY PROBLEMS, HOW DIFFICULT HAVE THESE MADE IT FOR YOU TO DO YOUR WORK, TAKE CARE OF THINGS AT HOME, OR GET ALONG WITH OTHER PEOPLE?: VERY DIFFICULT
IF YOU CHECKED OFF ANY PROBLEMS ON THIS QUESTIONNAIRE, HOW DIFFICULT HAVE THESE PROBLEMS MADE IT FOR YOU TO DO YOUR WORK, TAKE CARE OF THINGS AT HOME, OR GET ALONG WITH OTHER PEOPLE: VERY DIFFICULT
GAD7 TOTAL SCORE: 13
7. FEELING AFRAID AS IF SOMETHING AWFUL MIGHT HAPPEN: MORE THAN HALF THE DAYS
GAD7 TOTAL SCORE: 13
5. BEING SO RESTLESS THAT IT IS HARD TO SIT STILL: MORE THAN HALF THE DAYS
2. NOT BEING ABLE TO STOP OR CONTROL WORRYING: MORE THAN HALF THE DAYS
6. BECOMING EASILY ANNOYED OR IRRITABLE: MORE THAN HALF THE DAYS
1. FEELING NERVOUS, ANXIOUS, OR ON EDGE: MORE THAN HALF THE DAYS
4. TROUBLE RELAXING: MORE THAN HALF THE DAYS
3. WORRYING TOO MUCH ABOUT DIFFERENT THINGS: SEVERAL DAYS
GAD7 TOTAL SCORE: 13

## 2025-06-09 ASSESSMENT — PATIENT HEALTH QUESTIONNAIRE - PHQ9
SUM OF ALL RESPONSES TO PHQ QUESTIONS 1-9: 13
10. IF YOU CHECKED OFF ANY PROBLEMS, HOW DIFFICULT HAVE THESE PROBLEMS MADE IT FOR YOU TO DO YOUR WORK, TAKE CARE OF THINGS AT HOME, OR GET ALONG WITH OTHER PEOPLE: VERY DIFFICULT
SUM OF ALL RESPONSES TO PHQ QUESTIONS 1-9: 13

## 2025-06-09 NOTE — PROGRESS NOTES
Assessment & Plan     (K62.5) BRBPR (bright red blood per rectum)  (primary encounter diagnosis)  Comment: Patient having complaints of blood in his stool.  Patient endorses having this issue for about 7 months now.  Patient does have family history of colon cancer that was early onset, family member to be in 40s.  He has had hemorrhoids in the past, very possible that this could be recurring.  Patient has been told in the past to get a colonoscopy for evaluation but never ended up following up on it.  Patient also has had some weight loss as of late, however this seems to be more mental health dominated but cannot rule out other possibilities.  Shared decision to move forward with lab work and colonoscopy.  Will follow-up on lab work and colonoscopy results and whether further direction is necessary. Patient fully understands and is agreeable with plan of care, at this point patient will follow up as needed unless acute concerns arise in the meantime.  Plan: CBC with platelets and differential,         Comprehensive metabolic panel (BMP + Alb, Alk         Phos, ALT, AST, Total. Bili, TP), Adult GI          Referral - Procedure Only    (R05.2) Subacute cough  Comment: Patient recently this past month, is seeing for concerns for wheezing and cough.  He was provided prednisone burst.  Did not get imaging done at that point.  Patient continues to have cough.  Lung sounds clear but nonetheless will get x-ray today to ensure stable.  Will follow-up with results whether further direction is necessary. Patient fully understands and is agreeable with plan of care, at this point patient will follow up as needed unless acute concerns arise in the meantime.  Plan: XR Chest 2 Views, CBC with platelets and         differential, Comprehensive metabolic panel         (BMP + Alb, Alk Phos, ALT, AST, Total. Bili,         TP)    (F33.2) Severe episode of recurrent major depressive disorder, without psychotic features (H)    (F41.9) Anxiety  Comment: Uncontrolled.  Patient mental health a bit worse since this past December.  Patient having multiple stressors that have contributed.  Patient currently scheduled for counseling sessions.  Shared decision to increase patient's fluoxetine to 30 mg daily.  Will follow-up in 2 weeks with patient. Patient fully understands and is agreeable with plan of care, at this point patient will follow up as needed unless acute concerns arise in the meantime.  Plan: FLUoxetine (PROZAC) 10 MG capsule    (R63.4) Weight loss  Comment: Suspect largely contributed to patient's worsening mental health.  Plan of care as above for depression and anxiety.  Neck unremarkable other etiologies, colonoscopy as above.  Plan: Adult GI  Referral - Procedure Only    (B35.3) Tinea pedis of both feet  Comment: Patient has tried multiple over-the-counter products without success.  Patient works in environment as continual moisture which is more likely contributing.  Hygiene strategies with patient that he can take.  Will provide patient with ketoconazole cream to see if this can help feet dry.  Currently using OTC powder which has been helping, continue.  Follow-up if not noting any improvement. Discussed medication risks and benefits of ketoconazole cream with patient in detail with patient verbal understanding. Patient fully understands and is agreeable with plan of care, at this point patient will follow up as needed unless acute concerns arise in the meantime.  Plan: ketoconazole (NIZORAL) 2 % external cream       The longitudinal plan of care for the diagnosis(es)/condition(s) as documented were addressed during this visit. Due to the added complexity in care, I will continue to support Levi in the subsequent management and with ongoing continuity of care.    35 minutes spent by me on the date of the encounter doing chart review, history and exam, documentation and further activities per the note    Subjective    Levi is a 34 year old, presenting for the following health issues:  Weight Loss and Rectal Problem (Blood in stool)        6/9/2025    10:18 AM   Additional Questions   Roomed by Jannie BANKS     History of Present Illness       Mental Health Follow-up:  Patient presents to follow-up on Depression & Anxiety.Patient's depression since last visit has been:  Worse  The patient is not having other symptoms associated with depression.  Patient's anxiety since last visit has been:  Bad  The patient is not having other symptoms associated with anxiety.  Any significant life events: relationship concerns, financial concerns, housing concerns, grief or loss and health concerns  Patient is feeling anxious or having panic attacks.  Patient has no concerns about alcohol or drug use.    Reason for visit:  Multiple different symtoms  Symptom intensity:  Moderate  Symptom progression:  Staying the same  Had these symptoms before:  No He is missing 2 dose(s) of medications per week.  He is not taking prescribed medications regularly due to remembering to take.      Depression/anxiety  -worse, no SI or thoughts of self harm  -has had multiple stressors that have contributed  -would like to increase his fluoxetine  -scheduled for counseling     BRBPR  -has noted for ~7 months now  -was told to have colonoscopy prior, did not follow up on this   -has hx of hemorrhoids  -seems to be a bit excessive blood on TP as well as in toilet bowl.     Weight loss  - Patient endorses losing roughly 30 pounds  - Patient is significant other says he does sweat at nighttime  - Mental health worse as of late, significantly affects his appetite.  Does not eat nearly as much as what he used to    Cough  - Recently seen in urgent care at the end of May  - Was provided prednisone, did not get x-ray done   -prednisone did not help much  - Still has cough     Tinea pedis  - Bilateral feet  - Has used multiple over-the-counter products to try to help  - Works  in the environment that uses feet moist  - Been using foot powder lately to keep his feet dry which seems to help  - He washes his socks and shoes regularly  - Takes shower regularly    Review of Systems  Constitutional, HEENT, cardiovascular, pulmonary, GI, , skin and psych systems are negative, except as otherwise noted.      Objective    There were no vitals taken for this visit.  There is no height or weight on file to calculate BMI.  Physical Exam  Vitals and nursing note reviewed.   Constitutional:       General: He is not in acute distress.     Appearance: Normal appearance. He is not ill-appearing.   Cardiovascular:      Rate and Rhythm: Normal rate and regular rhythm.      Heart sounds: No murmur heard.     No friction rub. No gallop.   Pulmonary:      Effort: Pulmonary effort is normal. No respiratory distress.      Breath sounds: No wheezing, rhonchi or rales.   Genitourinary:     Comments:  exam declined.   Skin:     General: Skin is warm and dry.      Comments: Tinea pedis bilateral feet.    Neurological:      Mental Status: He is alert.   Psychiatric:         Mood and Affect: Mood normal.         Behavior: Behavior normal. Behavior is cooperative.         Thought Content: Thought content normal.         Judgment: Judgment normal.          Signed Electronically by: ARMANI Campos CNP

## 2025-06-09 NOTE — PATIENT INSTRUCTIONS
Increase fluoxetine to 30 mg daily   -follow up in 2 months    GI referral for colonoscopy for blood in stool    Labs today to check blood counts and metabolic panel    Chest xray given persistent cough    Ketoconazole for athletes foot

## 2025-06-10 ENCOUNTER — RESULTS FOLLOW-UP (OUTPATIENT)
Dept: FAMILY MEDICINE | Facility: CLINIC | Age: 35
End: 2025-06-10

## 2025-06-10 DIAGNOSIS — J98.4 PNEUMONITIS: Primary | ICD-10-CM

## 2025-06-10 LAB
ALBUMIN SERPL BCG-MCNC: 4.4 G/DL (ref 3.5–5.2)
ALP SERPL-CCNC: 65 U/L (ref 40–150)
ALT SERPL W P-5'-P-CCNC: 19 U/L (ref 0–70)
ANION GAP SERPL CALCULATED.3IONS-SCNC: 10 MMOL/L (ref 7–15)
AST SERPL W P-5'-P-CCNC: 23 U/L (ref 0–45)
BILIRUB SERPL-MCNC: 0.4 MG/DL
BUN SERPL-MCNC: 13.6 MG/DL (ref 6–20)
CALCIUM SERPL-MCNC: 9.6 MG/DL (ref 8.8–10.4)
CHLORIDE SERPL-SCNC: 105 MMOL/L (ref 98–107)
CREAT SERPL-MCNC: 1.04 MG/DL (ref 0.67–1.17)
EGFRCR SERPLBLD CKD-EPI 2021: >90 ML/MIN/1.73M2
GLUCOSE SERPL-MCNC: 93 MG/DL (ref 70–99)
HCO3 SERPL-SCNC: 25 MMOL/L (ref 22–29)
POTASSIUM SERPL-SCNC: 4.8 MMOL/L (ref 3.4–5.3)
PROT SERPL-MCNC: 6.9 G/DL (ref 6.4–8.3)
SODIUM SERPL-SCNC: 140 MMOL/L (ref 135–145)

## 2025-06-11 RX ORDER — AZITHROMYCIN 250 MG/1
TABLET, FILM COATED ORAL
Qty: 6 TABLET | Refills: 0 | Status: SHIPPED | OUTPATIENT
Start: 2025-06-11 | End: 2025-06-16

## 2025-06-11 RX ORDER — PREDNISONE 10 MG/1
TABLET ORAL
Qty: 20 TABLET | Refills: 0 | Status: SHIPPED | OUTPATIENT
Start: 2025-06-11

## 2025-06-18 ENCOUNTER — TRANSFERRED RECORDS (OUTPATIENT)
Dept: GASTROENTEROLOGY | Facility: CLINIC | Age: 35
End: 2025-06-18

## 2025-06-19 ENCOUNTER — RESULTS FOLLOW-UP (OUTPATIENT)
Dept: GASTROENTEROLOGY | Facility: CLINIC | Age: 35
End: 2025-06-19

## 2025-06-19 NOTE — PROCEDURES
[facility]   [address], [address], [place] [address]     Patient Name: Scooby Ellington  Gender:  Male  Exam Date: 06/18/2025 Visit Number:  [id number]  Age: 34 Years YOB: 1990  Attending MD: Monroe Dodge MD Medical Record#:  [id number]    Procedure: Colonoscopy   Indications: Rectal bleeding       Weight loss       Diarrhea      Referring MD: Monroe Villasenor CNP  Primary MD:      Monroe Villasenor CNP  Medications: Admitting Medications:   0.9% Normal Saline at TKO   Intra Procedure Medications:   Patient received monitored anesthesia care.     Complications: No immediate complications  ______________________________________________________________________________  Procedure:   An examination of the heart and lungs was performed and found to be within acceptable limits.  .  The patient was therefore deemed a reasonable candidate for endoscopy and sedation.   The risks and benefits of the procedure were explained to the patient.After obtaining informed consent, the patient received monitored anesthesia care and I passed the scope   without difficulty via the rectum to the ileum.  The appendiceal orifice and ic valve were identified.  The scope was retroflexed during the examination  The quality of the prep was good  (Miralax/Gatorade/2 tablets Bisacodyl/Magnesium Citrate).    This was a complete examination throughout the entire colon.    Findings:    Normal finding.  Location - ileum.    Normal finding.  Location  - entire colon.  Biopsy taken.  Maneuver - random biopsies.    Hemorrhoids.  Internal hemorrhoids without bleeding.  Evidence of prolapse of 1 hemorrhoid with erythema and granularity.    Impression:  Internal hemorrhoids  Internal hemorrhoids were found on your exam.  Please review the information packet provided to you.  If you are having symptoms and would like to consider treatment, please let us know.    Preliminary Plan:  Recommendation Comments:  I would be  happy to see this patient in clinic if biopsies are normal and symptoms continue.  Pathology Results:  A: COLON, RANDOM, BIOPSY:           1. Normal colonic mucosa           2. Negative for microscopic, active, and chronic colitis      MICROSCOPIC  A: Performed     Electronically signed by: Enrike Sidhu MD    Interpreted at [facility], [address] [place], [place] [address], [place] [address]  Patient Education given to patient:  Colon:  Hemorrhoids  Hemorrhoid Banding    Final Plan:  Repeat colonoscopy in 10 years for screening. If you have signs or symptoms of lower GI illness or a new diagnosis of colon cancer in an immediate family member, you should contact [facility] or your  primary provider to discuss whether your next exam should be repeated sooner.    We will attempt to contact you at appropriate intervals via [place] mail.  We may not be able to find you or contact you at that time, therefore you should know that the responsibility for following our recommendation rests with you.  If you don't hear from us at the time your procedure is due, please contact our office to schedule an appointment.  If your contact information should change, please contact our office so that we can update your record.      _Electronically signed by:___________________  Monroe Dodge MD                 06/18/2025    cc: Monroe Villasenor CNP  cc: Monroe Villasenor CNP

## 2025-07-09 ENCOUNTER — OFFICE VISIT (OUTPATIENT)
Dept: FAMILY MEDICINE | Facility: CLINIC | Age: 35
End: 2025-07-09

## 2025-07-09 VITALS
WEIGHT: 177.1 LBS | RESPIRATION RATE: 18 BRPM | BODY MASS INDEX: 24.79 KG/M2 | TEMPERATURE: 98.7 F | DIASTOLIC BLOOD PRESSURE: 71 MMHG | SYSTOLIC BLOOD PRESSURE: 107 MMHG | OXYGEN SATURATION: 96 % | HEIGHT: 71 IN | HEART RATE: 72 BPM

## 2025-07-09 DIAGNOSIS — R22.2 MASS OF TORSO: Primary | ICD-10-CM

## 2025-07-09 DIAGNOSIS — M54.50 LEFT-SIDED LOW BACK PAIN WITHOUT SCIATICA, UNSPECIFIED CHRONICITY: ICD-10-CM

## 2025-07-09 DIAGNOSIS — F41.9 ANXIETY: ICD-10-CM

## 2025-07-09 DIAGNOSIS — F33.2 SEVERE EPISODE OF RECURRENT MAJOR DEPRESSIVE DISORDER, WITHOUT PSYCHOTIC FEATURES (H): ICD-10-CM

## 2025-07-09 PROCEDURE — 99214 OFFICE O/P EST MOD 30 MIN: CPT

## 2025-07-09 NOTE — PROGRESS NOTES
Assessment & Plan     (R22.2) Mass of torso  (primary encounter diagnosis)  (M54.50) Left-sided low back pain without sciatica, unspecified chronicity  Comment: Mass noted on left torso/hip.  High suspicion of being a lipoma.  Soft nontender, round, mobile.  Roughly the size of a tennis ball.  Patient notes pain from this area that radiates into his lower back.  Possible that compression could be contributing to his discomfort.  Recommend that if he continues to have low back pain that becomes consistent that he have this looked into separately.  We did discuss that if the mass becomes bigger and more uncomfortable that we could send him to general surgery for evaluation.  Patient to follow-up via [facility] if this is the case.  Otherwise recommend following up for AWV with PCP and can address at that point as well.  Patient fully understands and is agreeable with plan of care, at this point patient will follow up as needed unless acute concerns arise in the meantime.    (F33.2) Severe episode of recurrent major depressive disorder, without psychotic features (H)  (F41.9) Anxiety  Comment: Anxiety and depression improved with increase to 30 mg fluoxetine.  No changes at this time, will stay at 30 mg fluoxetine daily.  Will follow-up with patient in 1 month via [facility]. Patient fully understands and is agreeable with plan of care, at this point patient will follow up as needed unless acute concerns arise in the meantime.    The longitudinal plan of care for the diagnosis(es)/condition(s) as documented were addressed during this visit. Due to the added complexity in care, I will continue to support Levi in the subsequent management and with ongoing continuity of care.    Subjective   Levi is a 34 year old, presenting for the following health issues:  Mass (Left lower side a mass inside )        7/9/2025     3:52 PM   Additional Questions   Roomed by Alejandra OSMAN   Accompanied by Kiersten Cabrera    History of Present Illness  "      Back Pain:  He presents for follow up of back pain. Patient's back pain is a new problem.    Original cause of back pain: not sure  First noticed back pain: today  Patient feels back pain: constantlyLocation of back pain:  Left lower back and left middle of back  Description of back pain: cramping  Back pain spreads: nowhere    Since patient first noticed back pain, pain is: unchanged  Does back pain interfere with his job:  Yes  On a scale of 1-10 (10 being the worst), patient describes pain as:  5  What makes back pain worse: bending, certain positions, standing and twisting   Acupuncture: not tried  Acetaminophen: not tried  Activity or exercise: not helpful  Chiropractor:  Not tried  Cold: not tried  Heat: not tried  Massage: not helpful  Muscle relaxants: not tried  NSAIDS: not tried  Opioids: not tried  Physical Therapy: not tried  Rest: not helpful  Steroid Injection: not tried  Stretching: not helpful  Surgery: not tried  TENS unit: not tried  Topical pain relievers: not tried  Other healthcare providers patient is seeing for back pain: None    Reason for visit:  Growth on my left side  Symptom onset:  3-4 weeks ago  Symptom intensity:  Mild  Symptom progression:  Staying the same  Had these symptoms before:  No  What makes it worse:  Standing for long periods of time  What makes it better:  Sitting or laying He is missing 1 dose(s) of medications per week.  He is not taking prescribed medications regularly due to remembering to take.          Concern - 3-4 weeks ago mass on left side its painful at times and has back pain on the same side. Patient states mass is on the inside part and if he touch the site it moves around.              Objective    /71 (BP Location: Right arm, Patient Position: Chair, Cuff Size: Adult Regular)   Pulse 72   Temp 98.7  F (37.1  C) (Oral)   Resp 18   Ht 1.803 m (5' 11\")   Wt 80.3 kg (177 lb 1.6 oz)   SpO2 96%   BMI 24.70 kg/m    Body mass index is 24.7 " kg/m .  Physical Exam               Signed Electronically by: ARMANI Campos CNP

## 2025-08-06 ENCOUNTER — TELEPHONE (OUTPATIENT)
Dept: FAMILY MEDICINE | Facility: CLINIC | Age: 35
End: 2025-08-06

## (undated) RX ORDER — GINSENG 100 MG
CAPSULE ORAL
Status: DISPENSED
Start: 2018-02-17